# Patient Record
Sex: FEMALE | Race: WHITE | Employment: OTHER | URBAN - METROPOLITAN AREA
[De-identification: names, ages, dates, MRNs, and addresses within clinical notes are randomized per-mention and may not be internally consistent; named-entity substitution may affect disease eponyms.]

---

## 2017-01-11 PROBLEM — M85.80 OSTEOPENIA: Status: ACTIVE | Noted: 2017-01-11

## 2017-01-11 PROBLEM — E11.9 TYPE 2 DIABETES MELLITUS WITHOUT COMPLICATION, WITHOUT LONG-TERM CURRENT USE OF INSULIN (HCC): Status: ACTIVE | Noted: 2017-01-11

## 2017-03-29 ENCOUNTER — TELEPHONE (OUTPATIENT)
Dept: INTERNAL MEDICINE CLINIC | Facility: CLINIC | Age: 67
End: 2017-03-29

## 2017-04-06 ENCOUNTER — TELEPHONE (OUTPATIENT)
Dept: NEUROLOGY | Facility: CLINIC | Age: 67
End: 2017-04-06

## 2017-04-07 ENCOUNTER — TELEPHONE (OUTPATIENT)
Dept: ENDOCRINOLOGY CLINIC | Facility: CLINIC | Age: 67
End: 2017-04-07

## 2017-04-07 DIAGNOSIS — E11.9 TYPE 2 DIABETES MELLITUS WITHOUT COMPLICATION, WITHOUT LONG-TERM CURRENT USE OF INSULIN (HCC): Primary | ICD-10-CM

## 2017-04-07 NOTE — TELEPHONE ENCOUNTER
Pt stated she will call back to schedule AWV. She also stated Dr Meghan Wiseman ordered a lipid and A1C she will be completing that next week and will have results sent to us.

## 2017-04-07 NOTE — TELEPHONE ENCOUNTER
LOV w JL 12/16 and needs lipids done this year. Noted she is also eligible for welcome to medicare visit. Labs are ordered. Please call her, thank you.

## 2017-04-13 PROCEDURE — 82570 ASSAY OF URINE CREATININE: CPT | Performed by: INTERNAL MEDICINE

## 2017-04-13 PROCEDURE — 82043 UR ALBUMIN QUANTITATIVE: CPT | Performed by: INTERNAL MEDICINE

## 2017-04-14 NOTE — TELEPHONE ENCOUNTER
Future Appointments  Date Time Provider Dahlia Liset   9/11/2017 8:30 AM Joaquin Wu MD [facility]     Patient wants to know if JL would like labs done?

## 2017-04-17 PROBLEM — E04.1 NODULAR THYROID DISEASE: Status: ACTIVE | Noted: 2017-04-17

## 2017-04-17 PROBLEM — E78.2 MIXED HYPERLIPIDEMIA: Status: ACTIVE | Noted: 2017-04-17

## 2017-04-17 PROBLEM — M85.80 OSTEOPENIA, UNSPECIFIED LOCATION: Status: ACTIVE | Noted: 2017-04-17

## 2017-05-02 ENCOUNTER — TELEPHONE (OUTPATIENT)
Dept: NEUROLOGY | Facility: CLINIC | Age: 67
End: 2017-05-02

## 2017-05-02 ENCOUNTER — LAB ENCOUNTER (OUTPATIENT)
Dept: LAB | Age: 67
End: 2017-05-02
Attending: Other
Payer: MEDICARE

## 2017-05-02 DIAGNOSIS — R89.9 ABNORMAL LABORATORY TEST RESULT: Primary | ICD-10-CM

## 2017-05-02 DIAGNOSIS — R89.9 ABNORMAL LABORATORY TEST: Primary | ICD-10-CM

## 2017-05-02 PROCEDURE — 86225 DNA ANTIBODY NATIVE: CPT

## 2017-05-02 PROCEDURE — 86235 NUCLEAR ANTIGEN ANTIBODY: CPT

## 2017-05-02 PROCEDURE — 36415 COLL VENOUS BLD VENIPUNCTURE: CPT

## 2017-05-02 PROCEDURE — 86038 ANTINUCLEAR ANTIBODIES: CPT

## 2017-05-02 NOTE — TELEPHONE ENCOUNTER
ROSARIO order placed in October is set up to go through [facility]. Patient is requesting Edw. Re-ordered as patient per reason for call planning on going to facility today. Informed , Jose R Will (ok per HIPAA). Verbalized understanding.

## 2017-05-19 ENCOUNTER — MED REC SCAN ONLY (OUTPATIENT)
Dept: INTERNAL MEDICINE CLINIC | Facility: CLINIC | Age: 67
End: 2017-05-19

## 2017-05-26 ENCOUNTER — TELEPHONE (OUTPATIENT)
Dept: HEMATOLOGY/ONCOLOGY | Facility: HOSPITAL | Age: 67
End: 2017-05-26

## 2017-05-26 NOTE — TELEPHONE ENCOUNTER
Patient phoned requesting order for right breast Mammogram. Mammogram order for right breast with ultrasound if indicated.  Patient to schedule f/u with Dr. Tata Joya if indicated after mammogram.

## 2017-06-12 ENCOUNTER — HOSPITAL ENCOUNTER (OUTPATIENT)
Dept: MAMMOGRAPHY | Facility: HOSPITAL | Age: 67
Discharge: HOME | End: 2017-06-12
Attending: CLINICAL NURSE SPECIALIST
Payer: MEDICARE

## 2017-06-12 DIAGNOSIS — N60.91 ATYPICAL DUCTAL HYPERPLASIA OF RIGHT BREAST: ICD-10-CM

## 2017-06-12 PROCEDURE — 77065 DX MAMMO INCL CAD UNI: CPT | Performed by: CLINICAL NURSE SPECIALIST

## 2017-06-12 PROCEDURE — 77061 BREAST TOMOSYNTHESIS UNI: CPT | Performed by: CLINICAL NURSE SPECIALIST

## 2017-06-13 DIAGNOSIS — E11.9 TYPE 2 DIABETES MELLITUS WITHOUT COMPLICATION, WITHOUT LONG-TERM CURRENT USE OF INSULIN (HCC): Primary | ICD-10-CM

## 2017-06-13 NOTE — PROGRESS NOTES
Spoke w/pt and she will go to [facility] lab to get done-please enter labs to edward so she can go and get the labs done

## 2017-06-13 NOTE — PROGRESS NOTES
Patient is due for lipid. Last lipid 2014.   Please call her and let her know order placed to [facility]. ( it was not done with her labs in April)

## 2017-06-13 NOTE — PROGRESS NOTES
HPI:    Patient ID: Bartolo He is a 73year old female.     HPI    Review of Systems         Current Outpatient Prescriptions:  Glucose Blood (ONETOUCH ULTRA BLUE) In Vitro Strip Test 2 times daily Disp: 200 each Rfl: 3   simvastatin 20 MG Oral Tab Amanda Da Silva

## 2017-06-16 ENCOUNTER — OFFICE VISIT (OUTPATIENT)
Dept: SURGERY | Facility: CLINIC | Age: 67
End: 2017-06-16

## 2017-06-16 VITALS
OXYGEN SATURATION: 97 % | HEART RATE: 74 BPM | BODY MASS INDEX: 31 KG/M2 | SYSTOLIC BLOOD PRESSURE: 121 MMHG | WEIGHT: 193 LBS | TEMPERATURE: 97 F | RESPIRATION RATE: 18 BRPM | DIASTOLIC BLOOD PRESSURE: 75 MMHG

## 2017-06-16 DIAGNOSIS — N60.81 FLAT EPITHELIAL ATYPIA (FEA) OF RIGHT BREAST: ICD-10-CM

## 2017-06-16 DIAGNOSIS — Z91.89 AT HIGH RISK FOR BREAST CANCER: Primary | ICD-10-CM

## 2017-06-16 PROCEDURE — 99214 OFFICE O/P EST MOD 30 MIN: CPT | Performed by: SURGERY

## 2017-06-16 RX ORDER — DOXYCYCLINE HYCLATE 100 MG
TABLET ORAL
COMMUNITY
Start: 2017-06-05 | End: 2017-06-16

## 2017-06-16 NOTE — PROGRESS NOTES
Breast Surgery Follow-Up Visit    Diagnosis: ADH/FEA of the Right breast status post Right breast excisional biopsy with preoperative wire localization on November 10, 2015. Stage: N/A    Date of Surgery: November 2015    History:    This 73year old wo UNLISTED  1/1/1968    Comment Biopsy breast open, LEFT NEGATIVE  MARKER PLACED IN BREAST    CATARACT  1/3/2011    Comment Cataract surgery, LEFT    COLONOSCOPY  1/1/2005    Comment negative per patient    HYSTERECTOMY  1/1/1988    Comment WITH CAROL Meneses tablet Rfl: 1   Glucose Blood (ONETOUCH VERIO) In Vitro Strip 1 each by Other route daily. Disp: 100 strip Rfl: 2   B Complex Vitamins (VITAMIN B COMPLEX OR) Inject  as directed daily. Disp:  Rfl:    Ascorbic Acid (VITAMIN C OR) Take  by mouth daily.  Disp: history of chest pain, chest pressure/discomfort, palpitations, irregular heartbeat, fainting or near-fainting, difficulty breathing when lying flat, SOB/Coughing at night, swelling of the legs or chest pain while walking.     Breasts:  See history of prese well-nourished, alert and oriented woman. There is not palpable cervical, supraclavicular or axillary adenopathy. The neck is supple with a midline trachea and no thyromegaly. Range of motion is good at both shoulders.  Breasts are symmetrical. The tumorec cancer. We then turned our discussion towards genetic counseling and testing as her likelihood for carrying a mutation is Low.   Based on the findings, I would not recommend Fidela Friday for counseling and testing unless an additional family membe total of 25 minutes, more than 50% of which was dedicated to the discussion of management options.

## 2017-06-21 RX ORDER — TOLTERODINE 2 MG/1
2 CAPSULE, EXTENDED RELEASE ORAL DAILY
Qty: 90 CAPSULE | Refills: 1 | Status: SHIPPED | OUTPATIENT
Start: 2017-06-21 | End: 2018-03-26

## 2017-06-21 NOTE — TELEPHONE ENCOUNTER
LOV: 12/7/16 w/ JL for f/u  FOV: 9/11/17 MWV  Last labs: 4/13/17 CMP,A1C,Microalbumin  Last Refill: 12/7/16 qt:90 1 refill  Per protocol sent to provider

## 2017-07-14 NOTE — ADDENDUM NOTE
Encounter addended by:  Tierra Cortes MA on: 7/14/2017 10:31 AM<BR>    Actions taken: Letter status changed

## 2017-08-29 RX ORDER — TOLTERODINE 2 MG/1
CAPSULE, EXTENDED RELEASE ORAL
Qty: 90 CAPSULE | Refills: 0 | Status: SHIPPED | OUTPATIENT
Start: 2017-08-29 | End: 2017-09-11

## 2017-08-29 NOTE — TELEPHONE ENCOUNTER
LOV: 12/07/2016   Upcoming visit: 9/11/2017   Last labs: 4/13/2017  Hgb A1c, , CMP,   Last refill: 6/21/2017 , 90 tabs, 1 refill   Per protocol  Route to provider

## 2017-09-11 ENCOUNTER — OFFICE VISIT (OUTPATIENT)
Dept: INTERNAL MEDICINE CLINIC | Facility: CLINIC | Age: 67
End: 2017-09-11

## 2017-09-11 VITALS
HEART RATE: 66 BPM | WEIGHT: 195 LBS | HEIGHT: 66 IN | BODY MASS INDEX: 31.34 KG/M2 | DIASTOLIC BLOOD PRESSURE: 68 MMHG | SYSTOLIC BLOOD PRESSURE: 114 MMHG | TEMPERATURE: 99 F | OXYGEN SATURATION: 99 % | RESPIRATION RATE: 17 BRPM

## 2017-09-11 DIAGNOSIS — E78.00 PURE HYPERCHOLESTEROLEMIA: ICD-10-CM

## 2017-09-11 DIAGNOSIS — Z78.0 POST-MENOPAUSAL: ICD-10-CM

## 2017-09-11 DIAGNOSIS — N32.81 OAB (OVERACTIVE BLADDER): ICD-10-CM

## 2017-09-11 DIAGNOSIS — E11.9 TYPE 2 DIABETES MELLITUS WITHOUT COMPLICATION, WITHOUT LONG-TERM CURRENT USE OF INSULIN (HCC): ICD-10-CM

## 2017-09-11 DIAGNOSIS — Z00.00 ROUTINE GENERAL MEDICAL EXAMINATION AT A HEALTH CARE FACILITY: Primary | ICD-10-CM

## 2017-09-11 LAB
CARTRIDGE LOT#: 741 NUMERIC
HEMOGLOBIN A1C: 6.9 % (ref 4.3–5.6)

## 2017-09-11 PROCEDURE — G0008 ADMIN INFLUENZA VIRUS VAC: HCPCS | Performed by: INTERNAL MEDICINE

## 2017-09-11 PROCEDURE — 90653 IIV ADJUVANT VACCINE IM: CPT | Performed by: INTERNAL MEDICINE

## 2017-09-11 PROCEDURE — 83036 HEMOGLOBIN GLYCOSYLATED A1C: CPT | Performed by: INTERNAL MEDICINE

## 2017-09-11 PROCEDURE — 99213 OFFICE O/P EST LOW 20 MIN: CPT | Performed by: INTERNAL MEDICINE

## 2017-09-11 PROCEDURE — G0402 INITIAL PREVENTIVE EXAM: HCPCS | Performed by: INTERNAL MEDICINE

## 2017-09-12 NOTE — PROGRESS NOTES
HPI:    Patient ID: Danney Ganser is a 73year old female.     HPI  Here for initial awv, feels well, oab controlled with current therapy, hypercholesterolemia, sugars ok but up a bit, htn, taking and tolerating meds  /68   Pulse 66   Temp 98.6 °F ( Allergies   PHYSICAL EXAM:   Physical Exam   Constitutional: She is oriented to person, place, and time. She appears well-developed and well-nourished. HENT:   Head: Normocephalic and atraumatic.    Right Ear: External ear normal.   Left Ear: External ear

## 2017-09-15 ENCOUNTER — HOSPITAL ENCOUNTER (OUTPATIENT)
Dept: BONE DENSITY | Age: 67
Discharge: HOME | End: 2017-09-15
Attending: INTERNAL MEDICINE
Payer: MEDICARE

## 2017-09-15 ENCOUNTER — APPOINTMENT (OUTPATIENT)
Dept: LAB | Age: 67
End: 2017-09-15
Attending: INTERNAL MEDICINE
Payer: MEDICARE

## 2017-09-15 DIAGNOSIS — E11.9 TYPE 2 DIABETES MELLITUS WITHOUT COMPLICATION, WITHOUT LONG-TERM CURRENT USE OF INSULIN (HCC): ICD-10-CM

## 2017-09-15 DIAGNOSIS — E78.2 MIXED HYPERLIPIDEMIA: ICD-10-CM

## 2017-09-15 DIAGNOSIS — M85.80 OSTEOPENIA, UNSPECIFIED LOCATION: ICD-10-CM

## 2017-09-15 DIAGNOSIS — E04.1 NODULAR THYROID DISEASE: ICD-10-CM

## 2017-09-15 DIAGNOSIS — Z78.0 POST-MENOPAUSAL: ICD-10-CM

## 2017-09-15 LAB
ALBUMIN SERPL-MCNC: 3.7 G/DL (ref 3.5–4.8)
ALP LIVER SERPL-CCNC: 70 U/L (ref 50–130)
ALT SERPL-CCNC: 35 U/L (ref 14–54)
AST SERPL-CCNC: 24 U/L (ref 15–41)
BILIRUB SERPL-MCNC: 0.3 MG/DL (ref 0.1–2)
BUN BLD-MCNC: 20 MG/DL (ref 8–20)
CALCIUM BLD-MCNC: 9.6 MG/DL (ref 8.3–10.3)
CHLORIDE: 106 MMOL/L (ref 101–111)
CHOLEST SMN-MCNC: 185 MG/DL (ref ?–200)
CO2: 26 MMOL/L (ref 22–32)
CREAT BLD-MCNC: 0.72 MG/DL (ref 0.55–1.02)
FREE T4: 1 NG/DL (ref 0.9–1.8)
GLUCOSE BLD-MCNC: 123 MG/DL (ref 70–99)
HDLC SERPL-MCNC: 84 MG/DL (ref 45–?)
HDLC SERPL: 2.2 {RATIO} (ref ?–4.44)
LDLC SERPL CALC-MCNC: 85 MG/DL (ref ?–130)
LDLC SERPL-MCNC: 16 MG/DL (ref 5–40)
M PROTEIN MFR SERPL ELPH: 7.6 G/DL (ref 6.1–8.3)
NONHDLC SERPL-MCNC: 101 MG/DL (ref ?–130)
POTASSIUM SERPL-SCNC: 4.3 MMOL/L (ref 3.6–5.1)
SODIUM SERPL-SCNC: 138 MMOL/L (ref 136–144)
TRIGLYCERIDES: 82 MG/DL (ref ?–150)
TSI SER-ACNC: 2.47 MIU/ML (ref 0.35–5.5)

## 2017-09-15 PROCEDURE — 84443 ASSAY THYROID STIM HORMONE: CPT

## 2017-09-15 PROCEDURE — 77080 DXA BONE DENSITY AXIAL: CPT | Performed by: INTERNAL MEDICINE

## 2017-09-15 PROCEDURE — 80053 COMPREHEN METABOLIC PANEL: CPT

## 2017-09-15 PROCEDURE — 84439 ASSAY OF FREE THYROXINE: CPT

## 2018-01-08 ENCOUNTER — HOSPITAL ENCOUNTER (OUTPATIENT)
Dept: MAMMOGRAPHY | Facility: HOSPITAL | Age: 68
Discharge: HOME | End: 2018-01-08
Attending: SURGERY
Payer: MEDICARE

## 2018-01-08 ENCOUNTER — TELEPHONE (OUTPATIENT)
Dept: SURGERY | Facility: CLINIC | Age: 68
End: 2018-01-08

## 2018-01-08 DIAGNOSIS — Z91.89 AT HIGH RISK FOR BREAST CANCER: ICD-10-CM

## 2018-01-08 DIAGNOSIS — N60.81 FLAT EPITHELIAL ATYPIA (FEA) OF RIGHT BREAST: ICD-10-CM

## 2018-01-08 DIAGNOSIS — R92.1 BREAST CALCIFICATION, RIGHT: Primary | ICD-10-CM

## 2018-01-08 PROCEDURE — 77066 DX MAMMO INCL CAD BI: CPT | Performed by: SURGERY

## 2018-01-08 PROCEDURE — 76642 ULTRASOUND BREAST LIMITED: CPT | Performed by: SURGERY

## 2018-01-08 PROCEDURE — 77062 BREAST TOMOSYNTHESIS BI: CPT | Performed by: SURGERY

## 2018-01-08 NOTE — IMAGING NOTE
Asssisted Dr. Ashly Gonzalez with recommendation for a right stereotactic breast biopsy for calcifications. Emotional and educational support provided. Written information provided to Ervin Harris.  Our breast center schedulers will call pt within 72 hours to

## 2018-01-08 NOTE — TELEPHONE ENCOUNTER
I contacted the patient to let her know Dr Katie Ferrera reviewed recent imaging with the radiologist, and agrees that she needs a biopsy of the area. Reviewed that the order is placed, and I will have someone call her to schedule.   Pt has already been contacte

## 2018-01-12 ENCOUNTER — HOSPITAL ENCOUNTER (OUTPATIENT)
Dept: MAMMOGRAPHY | Facility: HOSPITAL | Age: 68
Discharge: HOME | End: 2018-01-12
Attending: SURGERY
Payer: MEDICARE

## 2018-01-12 DIAGNOSIS — R92.1 BREAST CALCIFICATION, RIGHT: ICD-10-CM

## 2018-01-12 PROCEDURE — 88305 TISSUE EXAM BY PATHOLOGIST: CPT | Performed by: SURGERY

## 2018-01-12 PROCEDURE — 19081 BX BREAST 1ST LESION STRTCTC: CPT | Performed by: SURGERY

## 2018-02-15 PROCEDURE — 82570 ASSAY OF URINE CREATININE: CPT | Performed by: INTERNAL MEDICINE

## 2018-02-15 PROCEDURE — 82043 UR ALBUMIN QUANTITATIVE: CPT | Performed by: INTERNAL MEDICINE

## 2018-03-26 ENCOUNTER — OFFICE VISIT (OUTPATIENT)
Dept: INTERNAL MEDICINE CLINIC | Facility: CLINIC | Age: 68
End: 2018-03-26

## 2018-03-26 VITALS
BODY MASS INDEX: 30.69 KG/M2 | WEIGHT: 184.19 LBS | TEMPERATURE: 98 F | HEIGHT: 65 IN | DIASTOLIC BLOOD PRESSURE: 70 MMHG | HEART RATE: 68 BPM | SYSTOLIC BLOOD PRESSURE: 120 MMHG | RESPIRATION RATE: 12 BRPM

## 2018-03-26 DIAGNOSIS — E11.9 TYPE 2 DIABETES MELLITUS WITHOUT COMPLICATION, WITHOUT LONG-TERM CURRENT USE OF INSULIN (HCC): ICD-10-CM

## 2018-03-26 DIAGNOSIS — M25.511 CHRONIC RIGHT SHOULDER PAIN: Primary | ICD-10-CM

## 2018-03-26 DIAGNOSIS — G89.29 CHRONIC RIGHT SHOULDER PAIN: Primary | ICD-10-CM

## 2018-03-26 PROCEDURE — 90732 PPSV23 VACC 2 YRS+ SUBQ/IM: CPT | Performed by: INTERNAL MEDICINE

## 2018-03-26 PROCEDURE — G0009 ADMIN PNEUMOCOCCAL VACCINE: HCPCS | Performed by: INTERNAL MEDICINE

## 2018-03-26 PROCEDURE — 99213 OFFICE O/P EST LOW 20 MIN: CPT | Performed by: INTERNAL MEDICINE

## 2018-03-26 RX ORDER — TOLTERODINE 2 MG/1
2 CAPSULE, EXTENDED RELEASE ORAL DAILY
Qty: 90 CAPSULE | Refills: 1 | Status: SHIPPED | OUTPATIENT
Start: 2018-03-26 | End: 2018-07-29

## 2018-03-26 NOTE — PROGRESS NOTES
HPI:    Patient ID: Petey Bartholomew is a 78year old female.     HPI  Here for oab, dm, no lows, hasn't check post pran in a while, co right shoulder discomfort and decreased rom  /70 (BP Location: Right arm, Patient Position: Sitting, Cuff Size: adul sounds normal. She has no wheezes. Musculoskeletal: She exhibits no edema. Right sholder with decreased rom   Neurological: She is oriented to person, place, and time. Bilateral lower extremities visually inspected.  Bilateral foot monofilament skin e

## 2018-04-23 ENCOUNTER — HOSPITAL ENCOUNTER (OUTPATIENT)
Dept: GENERAL RADIOLOGY | Facility: HOSPITAL | Age: 68
Discharge: HOME | End: 2018-04-23
Attending: INTERNAL MEDICINE
Payer: MEDICARE

## 2018-04-23 DIAGNOSIS — G89.29 CHRONIC RIGHT SHOULDER PAIN: ICD-10-CM

## 2018-04-23 DIAGNOSIS — M25.511 CHRONIC RIGHT SHOULDER PAIN: ICD-10-CM

## 2018-04-23 PROCEDURE — 73030 X-RAY EXAM OF SHOULDER: CPT | Performed by: INTERNAL MEDICINE

## 2018-04-25 ENCOUNTER — TELEPHONE (OUTPATIENT)
Dept: INTERNAL MEDICINE CLINIC | Facility: CLINIC | Age: 68
End: 2018-04-25

## 2018-04-26 ENCOUNTER — MED REC SCAN ONLY (OUTPATIENT)
Dept: INTERNAL MEDICINE CLINIC | Facility: CLINIC | Age: 68
End: 2018-04-26

## 2018-05-04 ENCOUNTER — HOSPITAL ENCOUNTER (OUTPATIENT)
Dept: PHYSICAL THERAPY | Facility: HOSPITAL | Age: 68
Setting detail: THERAPIES SERIES
Discharge: HOME | End: 2018-05-04
Attending: INTERNAL MEDICINE
Payer: MEDICARE

## 2018-05-04 DIAGNOSIS — G89.29 CHRONIC RIGHT SHOULDER PAIN: ICD-10-CM

## 2018-05-04 DIAGNOSIS — M25.511 CHRONIC RIGHT SHOULDER PAIN: ICD-10-CM

## 2018-05-04 NOTE — PROGRESS NOTES
UPPER EXTREMITY EVALUATION:   Referring Physician: Dr. Yary Luevano  Diagnosis: right shoulder pain    Date of Service: 5/4/2018     PATIENT SUMMARY   Aydin Austin is a 78year old y/o female who presents to therapy today with complaints o right shoulder findings that reproduce the patient's chief complaint include +impingement sign, + inferior glide discomfort. Physical Exam findings that improve the patient's chief complaint including stretch ROM  .  Functional limitations include lifting, use of right U improve shoulder flexion AROM to >150 degrees to be able to reach into overhead cabinets without pain or restriction (8 visits)  · Pt will improve shoulder abduction AROM to >150 degrees to improve ability to don deodorant, don/doff shirts, and wash hair ( PT    [de-identified] certification required: Yes  I certify the need for these services furnished under this plan of treatment and while under my care.     X___________________________________________________ Date____________________    Certification From: 5/4

## 2018-05-08 ENCOUNTER — HOSPITAL ENCOUNTER (OUTPATIENT)
Dept: PHYSICAL THERAPY | Facility: HOSPITAL | Age: 68
Setting detail: THERAPIES SERIES
Discharge: HOME | End: 2018-05-08
Attending: INTERNAL MEDICINE
Payer: MEDICARE

## 2018-05-08 PROCEDURE — 97140 MANUAL THERAPY 1/> REGIONS: CPT | Performed by: PHYSICAL THERAPIST

## 2018-05-08 PROCEDURE — 97110 THERAPEUTIC EXERCISES: CPT | Performed by: PHYSICAL THERAPIST

## 2018-05-08 PROCEDURE — 97112 NEUROMUSCULAR REEDUCATION: CPT | Performed by: PHYSICAL THERAPIST

## 2018-05-08 NOTE — PROGRESS NOTES
Dx: right shoulder pain         Authorized # of Visits:  8         Next MD visit: none scheduled  Fall Risk: standard         Precautions: n/a             Subjective: My shoulder was sore after the  First visit. Not sure if the tape helped or not.      Obj

## 2018-05-11 ENCOUNTER — HOSPITAL ENCOUNTER (OUTPATIENT)
Dept: PHYSICAL THERAPY | Facility: HOSPITAL | Age: 68
Setting detail: THERAPIES SERIES
Discharge: HOME | End: 2018-05-11
Attending: INTERNAL MEDICINE
Payer: MEDICARE

## 2018-05-11 PROCEDURE — 97140 MANUAL THERAPY 1/> REGIONS: CPT | Performed by: PHYSICAL THERAPIST

## 2018-05-11 PROCEDURE — 97110 THERAPEUTIC EXERCISES: CPT | Performed by: PHYSICAL THERAPIST

## 2018-05-11 PROCEDURE — 97112 NEUROMUSCULAR REEDUCATION: CPT | Performed by: PHYSICAL THERAPIST

## 2018-05-11 NOTE — PROGRESS NOTES
Dx: right shoulder pain         Authorized # of Visits:  8         Next MD visit: none scheduled  Fall Risk: standard         Precautions: n/a             Subjective: Sleeping a lot better through the night. Shoulder is getting stronger.        Objective: S

## 2018-05-14 ENCOUNTER — HOSPITAL ENCOUNTER (OUTPATIENT)
Dept: PHYSICAL THERAPY | Facility: HOSPITAL | Age: 68
Setting detail: THERAPIES SERIES
Discharge: HOME | End: 2018-05-14
Attending: INTERNAL MEDICINE
Payer: MEDICARE

## 2018-05-14 PROCEDURE — 97110 THERAPEUTIC EXERCISES: CPT | Performed by: PHYSICAL THERAPIST

## 2018-05-14 PROCEDURE — 97140 MANUAL THERAPY 1/> REGIONS: CPT | Performed by: PHYSICAL THERAPIST

## 2018-05-14 PROCEDURE — 97112 NEUROMUSCULAR REEDUCATION: CPT | Performed by: PHYSICAL THERAPIST

## 2018-05-14 NOTE — PROGRESS NOTES
Dx: right shoulder pain         Authorized # of Visits:  8         Next MD visit: none scheduled  Fall Risk: standard         Precautions: n/a             Subjective: Have been trying to garden a little and my shoulder hurts a little but not too bad.      O

## 2018-05-17 ENCOUNTER — HOSPITAL ENCOUNTER (OUTPATIENT)
Dept: PHYSICAL THERAPY | Facility: HOSPITAL | Age: 68
Setting detail: THERAPIES SERIES
Discharge: HOME | End: 2018-05-17
Attending: INTERNAL MEDICINE
Payer: MEDICARE

## 2018-05-17 PROCEDURE — 97140 MANUAL THERAPY 1/> REGIONS: CPT | Performed by: PHYSICAL THERAPIST

## 2018-05-17 PROCEDURE — 97110 THERAPEUTIC EXERCISES: CPT | Performed by: PHYSICAL THERAPIST

## 2018-05-17 PROCEDURE — 97112 NEUROMUSCULAR REEDUCATION: CPT | Performed by: PHYSICAL THERAPIST

## 2018-05-17 NOTE — PROGRESS NOTES
Dx: right shoulder pain         Authorized # of Visits:  8         Next MD visit: none scheduled  Fall Risk: standard         Precautions: n/a             Subjective: Shoulder is feeling a little looser, not as tight.   Sore thought today, maybe because I w

## 2018-05-21 ENCOUNTER — HOSPITAL ENCOUNTER (OUTPATIENT)
Dept: PHYSICAL THERAPY | Facility: HOSPITAL | Age: 68
Setting detail: THERAPIES SERIES
Discharge: HOME | End: 2018-05-21
Attending: INTERNAL MEDICINE
Payer: MEDICARE

## 2018-05-21 PROCEDURE — 97110 THERAPEUTIC EXERCISES: CPT | Performed by: PHYSICAL THERAPIST

## 2018-05-21 PROCEDURE — 97140 MANUAL THERAPY 1/> REGIONS: CPT | Performed by: PHYSICAL THERAPIST

## 2018-05-21 PROCEDURE — 97112 NEUROMUSCULAR REEDUCATION: CPT | Performed by: PHYSICAL THERAPIST

## 2018-05-22 ENCOUNTER — APPOINTMENT (OUTPATIENT)
Dept: PHYSICAL THERAPY | Facility: HOSPITAL | Age: 68
End: 2018-05-22
Attending: INTERNAL MEDICINE
Payer: MEDICARE

## 2018-05-24 ENCOUNTER — APPOINTMENT (OUTPATIENT)
Dept: PHYSICAL THERAPY | Facility: HOSPITAL | Age: 68
End: 2018-05-24
Attending: INTERNAL MEDICINE
Payer: MEDICARE

## 2018-05-24 ENCOUNTER — HOSPITAL ENCOUNTER (OUTPATIENT)
Dept: PHYSICAL THERAPY | Facility: HOSPITAL | Age: 68
Setting detail: THERAPIES SERIES
Discharge: HOME | End: 2018-05-24
Attending: INTERNAL MEDICINE
Payer: MEDICARE

## 2018-05-24 PROCEDURE — 97140 MANUAL THERAPY 1/> REGIONS: CPT | Performed by: PHYSICAL THERAPIST

## 2018-05-24 PROCEDURE — 97110 THERAPEUTIC EXERCISES: CPT | Performed by: PHYSICAL THERAPIST

## 2018-05-24 PROCEDURE — 97112 NEUROMUSCULAR REEDUCATION: CPT | Performed by: PHYSICAL THERAPIST

## 2018-05-25 NOTE — PROGRESS NOTES
Dx: right shoulder pain         Authorized # of Visits:  8         Next MD visit: none scheduled  Fall Risk: standard         Precautions: n/a             Subjective: Have been working a lot on trying to clean my house as I have guests coming ,and my shoul scap bore with  green knobby roll.  STM to UT levator, lateral scap border Red TB x20   ER/IR   Rows  Extension  Lat pull downs      Red TB row  ER/IR   Extension x20 Red TB x20   ER/IR   Rows  Extension  Red TB x20   ER/IR   Rows  Extension Red TB x20   ER

## 2018-05-31 ENCOUNTER — HOSPITAL ENCOUNTER (OUTPATIENT)
Dept: PHYSICAL THERAPY | Facility: HOSPITAL | Age: 68
Setting detail: THERAPIES SERIES
Discharge: HOME | End: 2018-05-31
Attending: INTERNAL MEDICINE
Payer: MEDICARE

## 2018-05-31 PROCEDURE — 97110 THERAPEUTIC EXERCISES: CPT | Performed by: PHYSICAL THERAPIST

## 2018-05-31 PROCEDURE — 97140 MANUAL THERAPY 1/> REGIONS: CPT | Performed by: PHYSICAL THERAPIST

## 2018-05-31 PROCEDURE — 97112 NEUROMUSCULAR REEDUCATION: CPT | Performed by: PHYSICAL THERAPIST

## 2018-05-31 NOTE — PROGRESS NOTES
Dx: right shoulder pain         Authorized # of Visits:  8         Next MD visit: none scheduled  Fall Risk: standard         Precautions: n/a             Subjective: tape  seems like it really helped with the mm tightness, discomfort.     Objective: Seen f inferior lateral scap bore with  green knobby roll STM to right rotator cuff, inferior lateral scap bore with  green knobby roll.  STM to UT levator, lateral scap border Red TB x20   ER/IR   Rows  Extension  Lat pull downs  Red TB x20   ER/IR   Rows  Extens

## 2018-06-04 ENCOUNTER — APPOINTMENT (OUTPATIENT)
Dept: PHYSICAL THERAPY | Facility: HOSPITAL | Age: 68
End: 2018-06-04
Attending: INTERNAL MEDICINE
Payer: MEDICARE

## 2018-06-04 ENCOUNTER — HOSPITAL ENCOUNTER (OUTPATIENT)
Dept: PHYSICAL THERAPY | Facility: HOSPITAL | Age: 68
Setting detail: THERAPIES SERIES
Discharge: HOME | End: 2018-06-04
Attending: INTERNAL MEDICINE
Payer: MEDICARE

## 2018-06-04 PROCEDURE — 97112 NEUROMUSCULAR REEDUCATION: CPT | Performed by: PHYSICAL THERAPIST

## 2018-06-04 PROCEDURE — 97140 MANUAL THERAPY 1/> REGIONS: CPT | Performed by: PHYSICAL THERAPIST

## 2018-06-04 PROCEDURE — 97110 THERAPEUTIC EXERCISES: CPT | Performed by: PHYSICAL THERAPIST

## 2018-06-04 NOTE — PROGRESS NOTES
Dx: right shoulder pain         Authorized # of Visits:  8         Next MD visit: none scheduled  Fall Risk: standard         Precautions: n/a             Subjective: Overall I can tell that my shoulder is stronger, and less tight.   Still have some difficu MET      Plan:  DC PT at this time, all goals achieved. Will call with any questions, concerns.     Date: 5/8/2018 TX#: 2/8 Date:        5/11/18         TX#: 3/   Date:       5/14/18        TX#: 4/8 Date:      5/17/18         TX#: 5/ Date:  5/21/18 Rows  Extension  Red TB x20   ER/IR   Rows  Extension Red TB x20   ER/IR   Rows  Extension ER/IR sidelying 2#x20 fatigue ER/IR sidelying 2#x20 fatigue ER/IR sidelying 2#x20 fatigue      ER/IR sidelying 2#x20 fatigue    REVIEW HEP                       Sk

## 2018-06-07 ENCOUNTER — APPOINTMENT (OUTPATIENT)
Dept: PHYSICAL THERAPY | Facility: HOSPITAL | Age: 68
End: 2018-06-07
Attending: INTERNAL MEDICINE
Payer: MEDICARE

## 2018-06-12 ENCOUNTER — APPOINTMENT (OUTPATIENT)
Dept: PHYSICAL THERAPY | Facility: HOSPITAL | Age: 68
End: 2018-06-12
Attending: INTERNAL MEDICINE
Payer: MEDICARE

## 2018-06-14 ENCOUNTER — APPOINTMENT (OUTPATIENT)
Dept: PHYSICAL THERAPY | Facility: HOSPITAL | Age: 68
End: 2018-06-14
Attending: INTERNAL MEDICINE
Payer: MEDICARE

## 2018-06-19 ENCOUNTER — APPOINTMENT (OUTPATIENT)
Dept: PHYSICAL THERAPY | Facility: HOSPITAL | Age: 68
End: 2018-06-19
Attending: INTERNAL MEDICINE
Payer: MEDICARE

## 2018-06-21 ENCOUNTER — APPOINTMENT (OUTPATIENT)
Dept: PHYSICAL THERAPY | Facility: HOSPITAL | Age: 68
End: 2018-06-21
Attending: INTERNAL MEDICINE
Payer: MEDICARE

## 2018-06-26 ENCOUNTER — OFFICE VISIT (OUTPATIENT)
Dept: SURGERY | Facility: CLINIC | Age: 68
End: 2018-06-26

## 2018-06-26 VITALS
BODY MASS INDEX: 29.7 KG/M2 | HEIGHT: 66 IN | OXYGEN SATURATION: 98 % | SYSTOLIC BLOOD PRESSURE: 104 MMHG | DIASTOLIC BLOOD PRESSURE: 68 MMHG | HEART RATE: 75 BPM | RESPIRATION RATE: 20 BRPM | WEIGHT: 184.81 LBS

## 2018-06-26 DIAGNOSIS — N60.81 FLAT EPITHELIAL ATYPIA (FEA) OF RIGHT BREAST: ICD-10-CM

## 2018-06-26 DIAGNOSIS — R92.1 BREAST CALCIFICATION, RIGHT: Primary | ICD-10-CM

## 2018-06-26 PROCEDURE — 99214 OFFICE O/P EST MOD 30 MIN: CPT | Performed by: SURGERY

## 2018-06-27 NOTE — PROGRESS NOTES
Breast Surgery Follow-Up Visit    Diagnosis: ADH/FEA of the Right breast status post Right breast excisional biopsy with preoperative wire localization on November 10, 2015. Stage: N/A    Date of Surgery: November 2015    History:    This 78year old wo Comment: excisional bx of right breast  1/1/1969: BREAST SURGERY PROCEDURE UNLISTED      Comment: Biopsy breast open, LEFT NEGATIVE  MARKER                PLACED IN BREAST  1/3/2011: CATARACT      Comment: Cataract surgery, LEFT  1/1/2005: COLONOSCOPY aspirin (ECOTRIN LOW STRENGTH) 81 MG Oral Tab EC Take 81 mg by mouth daily. Disp:  Rfl:    Multiple Vitamins-Minerals (CENTRUM) Oral Tab Take 1 tablet by mouth daily.  Disp:  Rfl:        Allergies:    No Known Allergies     Family History   Problem Relati illness    Gastrointestinal:     There is no history of difficulty or pain with swallowing, reflux symptoms, vomiting, dark or bloody stools, constipation, yellowing of the skin, indigestion, nausea, change in bowel habits, diarrhea, abdominal pain or vomi is good at both shoulders. Breasts are symmetrical. The tumorectomy site is in the Right breast and shows no evidence of local recurrence. Both nipples are everted with no discharge.  There is not dominant masses, focal nodularity or skin changes on either diagnosed with breast cancer. We then turned our discussion towards genetic counseling and testing as her likelihood for carrying a mutation is Low.   Based on the findings, I would not recommend Deon Harmon for counseling and testing unless an a

## 2018-07-25 ENCOUNTER — HOSPITAL ENCOUNTER (OUTPATIENT)
Dept: MAMMOGRAPHY | Facility: HOSPITAL | Age: 68
Discharge: HOME | End: 2018-07-25
Attending: SURGERY
Payer: MEDICARE

## 2018-07-25 DIAGNOSIS — R92.1 BREAST CALCIFICATION, RIGHT: Primary | ICD-10-CM

## 2018-07-25 DIAGNOSIS — R92.1 BREAST CALCIFICATION, RIGHT: ICD-10-CM

## 2018-07-25 PROCEDURE — 77065 DX MAMMO INCL CAD UNI: CPT | Performed by: SURGERY

## 2018-07-25 PROCEDURE — 77061 BREAST TOMOSYNTHESIS UNI: CPT | Performed by: SURGERY

## 2018-07-30 ENCOUNTER — MED REC SCAN ONLY (OUTPATIENT)
Dept: INTERNAL MEDICINE CLINIC | Facility: CLINIC | Age: 68
End: 2018-07-30

## 2018-07-30 RX ORDER — TOLTERODINE 2 MG/1
CAPSULE, EXTENDED RELEASE ORAL
Qty: 90 CAPSULE | Refills: 1 | Status: SHIPPED | OUTPATIENT
Start: 2018-07-30 | End: 2019-01-22

## 2018-07-30 NOTE — TELEPHONE ENCOUNTER
LOV: 3/26/2018   LAST LABS: 6/15/2018 HGB A1C, CMP, TSH   LAST REFILL: 3/26/2018 90 CAPS 1 REFILL     PER PROTOCOL ROUTE TO PROVIDER

## 2018-09-21 ENCOUNTER — OFFICE VISIT (OUTPATIENT)
Dept: INTERNAL MEDICINE CLINIC | Facility: CLINIC | Age: 68
End: 2018-09-21
Payer: COMMERCIAL

## 2018-09-21 VITALS
TEMPERATURE: 98 F | HEIGHT: 65 IN | WEIGHT: 188 LBS | DIASTOLIC BLOOD PRESSURE: 74 MMHG | SYSTOLIC BLOOD PRESSURE: 126 MMHG | BODY MASS INDEX: 31.32 KG/M2 | RESPIRATION RATE: 20 BRPM | HEART RATE: 64 BPM

## 2018-09-21 DIAGNOSIS — F41.9 ANXIETY: ICD-10-CM

## 2018-09-21 DIAGNOSIS — E78.00 PURE HYPERCHOLESTEROLEMIA: ICD-10-CM

## 2018-09-21 DIAGNOSIS — N60.91 ATYPICAL DUCTAL HYPERPLASIA OF RIGHT BREAST: ICD-10-CM

## 2018-09-21 DIAGNOSIS — E04.1 NODULAR THYROID DISEASE: ICD-10-CM

## 2018-09-21 DIAGNOSIS — E66.09 CLASS 1 OBESITY DUE TO EXCESS CALORIES WITH SERIOUS COMORBIDITY AND BODY MASS INDEX (BMI) OF 31.0 TO 31.9 IN ADULT: ICD-10-CM

## 2018-09-21 DIAGNOSIS — R92.0 MICROCALCIFICATION OF RIGHT BREAST ON MAMMOGRAM: ICD-10-CM

## 2018-09-21 DIAGNOSIS — Z80.41 FAMILY HISTORY OF MALIGNANT NEOPLASM OF OVARY: ICD-10-CM

## 2018-09-21 DIAGNOSIS — E11.9 TYPE 2 DIABETES MELLITUS WITHOUT COMPLICATION, WITHOUT LONG-TERM CURRENT USE OF INSULIN (HCC): ICD-10-CM

## 2018-09-21 DIAGNOSIS — Z00.00 ROUTINE GENERAL MEDICAL EXAMINATION AT A HEALTH CARE FACILITY: Primary | ICD-10-CM

## 2018-09-21 DIAGNOSIS — E78.2 MIXED HYPERLIPIDEMIA: ICD-10-CM

## 2018-09-21 DIAGNOSIS — M85.80 OSTEOPENIA, UNSPECIFIED LOCATION: ICD-10-CM

## 2018-09-21 DIAGNOSIS — R47.89 WORD FINDING PROBLEM: ICD-10-CM

## 2018-09-21 PROCEDURE — G0008 ADMIN INFLUENZA VIRUS VAC: HCPCS | Performed by: INTERNAL MEDICINE

## 2018-09-21 PROCEDURE — 96160 PT-FOCUSED HLTH RISK ASSMT: CPT | Performed by: INTERNAL MEDICINE

## 2018-09-21 PROCEDURE — G0439 PPPS, SUBSEQ VISIT: HCPCS | Performed by: INTERNAL MEDICINE

## 2018-09-21 PROCEDURE — 99397 PER PM REEVAL EST PAT 65+ YR: CPT | Performed by: INTERNAL MEDICINE

## 2018-09-21 PROCEDURE — 90653 IIV ADJUVANT VACCINE IM: CPT | Performed by: INTERNAL MEDICINE

## 2018-09-21 NOTE — PROGRESS NOTES
HPI:    Patient ID: Obi Cortez is a 78year old female.     HPI  Here for for AHA, no acute complaints, DM, OAB controlled on meds, overweight, anxiety controlled with lifestyle, feels ok, recent edgardo results reviewed  /74   Pulse 64   Temp 98.2 ° well-developed and well-nourished. HENT:   Head: Normocephalic and atraumatic. Right Ear: External ear normal.   Left Ear: External ear normal.   Mouth/Throat: No oropharyngeal exudate. Eyes: Pupils are equal, round, and reactive to light.  Right eye

## 2018-09-24 PROCEDURE — 80061 LIPID PANEL: CPT | Performed by: INTERNAL MEDICINE

## 2018-12-19 ENCOUNTER — TELEPHONE (OUTPATIENT)
Dept: INTERNAL MEDICINE CLINIC | Facility: CLINIC | Age: 68
End: 2018-12-19

## 2019-01-09 ENCOUNTER — TELEPHONE (OUTPATIENT)
Dept: INTERNAL MEDICINE CLINIC | Facility: CLINIC | Age: 69
End: 2019-01-09

## 2019-01-22 ENCOUNTER — LAB ENCOUNTER (OUTPATIENT)
Dept: LAB | Age: 69
End: 2019-01-22
Attending: NURSE PRACTITIONER
Payer: MEDICARE

## 2019-01-22 ENCOUNTER — OFFICE VISIT (OUTPATIENT)
Dept: INTERNAL MEDICINE CLINIC | Facility: CLINIC | Age: 69
End: 2019-01-22
Payer: COMMERCIAL

## 2019-01-22 ENCOUNTER — HOSPITAL ENCOUNTER (OUTPATIENT)
Dept: GENERAL RADIOLOGY | Facility: HOSPITAL | Age: 69
Discharge: HOME | End: 2019-01-22
Attending: NURSE PRACTITIONER
Payer: MEDICARE

## 2019-01-22 VITALS
BODY MASS INDEX: 30.82 KG/M2 | RESPIRATION RATE: 16 BRPM | HEIGHT: 65 IN | WEIGHT: 185 LBS | SYSTOLIC BLOOD PRESSURE: 120 MMHG | TEMPERATURE: 98 F | DIASTOLIC BLOOD PRESSURE: 70 MMHG | HEART RATE: 60 BPM

## 2019-01-22 DIAGNOSIS — E04.1 NODULAR THYROID DISEASE: ICD-10-CM

## 2019-01-22 DIAGNOSIS — Z96.1 PSEUDOPHAKIA: ICD-10-CM

## 2019-01-22 DIAGNOSIS — H35.372 EPIRETINAL MEMBRANE (ERM) OF LEFT EYE: ICD-10-CM

## 2019-01-22 DIAGNOSIS — F41.9 ANXIETY: ICD-10-CM

## 2019-01-22 DIAGNOSIS — H35.062: ICD-10-CM

## 2019-01-22 DIAGNOSIS — H35.372 EPIRETINAL MEMBRANE (ERM) OF LEFT EYE: Primary | ICD-10-CM

## 2019-01-22 DIAGNOSIS — E11.9 TYPE 2 DIABETES MELLITUS WITHOUT COMPLICATION, WITHOUT LONG-TERM CURRENT USE OF INSULIN (HCC): ICD-10-CM

## 2019-01-22 DIAGNOSIS — E78.00 PURE HYPERCHOLESTEROLEMIA: ICD-10-CM

## 2019-01-22 PROBLEM — E78.2 MIXED HYPERLIPIDEMIA: Status: RESOLVED | Noted: 2017-04-17 | Resolved: 2019-01-22

## 2019-01-22 PROBLEM — M85.80 OSTEOPENIA, UNSPECIFIED LOCATION: Status: RESOLVED | Noted: 2017-04-17 | Resolved: 2019-01-22

## 2019-01-22 LAB
ALBUMIN SERPL-MCNC: 3.6 G/DL (ref 3.1–4.5)
ALBUMIN/GLOB SERPL: 1 {RATIO} (ref 1–2)
ALP LIVER SERPL-CCNC: 76 U/L (ref 55–142)
ALT SERPL-CCNC: 27 U/L (ref 14–54)
ANION GAP SERPL CALC-SCNC: 5 MMOL/L (ref 0–18)
AST SERPL-CCNC: 18 U/L (ref 15–41)
BASOPHILS # BLD AUTO: 0.07 X10(3) UL (ref 0–0.1)
BASOPHILS NFR BLD AUTO: 1.2 %
BILIRUB SERPL-MCNC: 0.3 MG/DL (ref 0.1–2)
BUN BLD-MCNC: 19 MG/DL (ref 8–20)
BUN/CREAT SERPL: 31.7 (ref 10–20)
CALCIUM BLD-MCNC: 9.3 MG/DL (ref 8.3–10.3)
CHLORIDE SERPL-SCNC: 105 MMOL/L (ref 101–111)
CO2 SERPL-SCNC: 28 MMOL/L (ref 22–32)
CREAT BLD-MCNC: 0.6 MG/DL (ref 0.55–1.02)
EOSINOPHIL # BLD AUTO: 0.19 X10(3) UL (ref 0–0.3)
EOSINOPHIL NFR BLD AUTO: 3.2 %
ERYTHROCYTE [DISTWIDTH] IN BLOOD BY AUTOMATED COUNT: 13.5 % (ref 11.5–16)
GLOBULIN PLAS-MCNC: 3.7 G/DL (ref 2.8–4.4)
GLUCOSE BLD-MCNC: 113 MG/DL (ref 70–99)
HCT VFR BLD AUTO: 44.2 % (ref 34–50)
HGB BLD-MCNC: 13.8 G/DL (ref 12–16)
IMMATURE GRANULOCYTE COUNT: 0.02 X10(3) UL (ref 0–1)
IMMATURE GRANULOCYTE RATIO %: 0.3 %
LYMPHOCYTES # BLD AUTO: 1.97 X10(3) UL (ref 0.9–4)
LYMPHOCYTES NFR BLD AUTO: 32.7 %
M PROTEIN MFR SERPL ELPH: 7.3 G/DL (ref 6.4–8.2)
MCH RBC QN AUTO: 27.7 PG (ref 27–33.2)
MCHC RBC AUTO-ENTMCNC: 31.2 G/DL (ref 31–37)
MCV RBC AUTO: 88.8 FL (ref 81–100)
MONOCYTES # BLD AUTO: 0.43 X10(3) UL (ref 0.1–1)
MONOCYTES NFR BLD AUTO: 7.1 %
NEUTROPHIL ABS PRELIM: 3.35 X10 (3) UL (ref 1.3–6.7)
NEUTROPHILS # BLD AUTO: 3.35 X10(3) UL (ref 1.3–6.7)
NEUTROPHILS NFR BLD AUTO: 55.5 %
OSMOLALITY SERPL CALC.SUM OF ELEC: 289 MOSM/KG (ref 275–295)
PLATELET # BLD AUTO: 218 10(3)UL (ref 150–450)
POTASSIUM SERPL-SCNC: 5.1 MMOL/L (ref 3.6–5.1)
RBC # BLD AUTO: 4.98 X10(6)UL (ref 3.8–5.1)
RED CELL DISTRIBUTION WIDTH-SD: 43.8 FL (ref 35.1–46.3)
SODIUM SERPL-SCNC: 138 MMOL/L (ref 136–144)
WBC # BLD AUTO: 6 X10(3) UL (ref 4–13)

## 2019-01-22 PROCEDURE — 84075 ASSAY ALKALINE PHOSPHATASE: CPT

## 2019-01-22 PROCEDURE — 93000 ELECTROCARDIOGRAM COMPLETE: CPT | Performed by: NURSE PRACTITIONER

## 2019-01-22 PROCEDURE — 71046 X-RAY EXAM CHEST 2 VIEWS: CPT | Performed by: NURSE PRACTITIONER

## 2019-01-22 PROCEDURE — 85025 COMPLETE CBC W/AUTO DIFF WBC: CPT

## 2019-01-22 PROCEDURE — 99214 OFFICE O/P EST MOD 30 MIN: CPT | Performed by: NURSE PRACTITIONER

## 2019-01-22 PROCEDURE — 84080 ASSAY ALKALINE PHOSPHATASES: CPT

## 2019-01-22 PROCEDURE — 80053 COMPREHEN METABOLIC PANEL: CPT

## 2019-01-22 RX ORDER — TOLTERODINE 2 MG/1
2 CAPSULE, EXTENDED RELEASE ORAL
Qty: 90 CAPSULE | Refills: 1 | Status: SHIPPED | OUTPATIENT
Start: 2019-01-22 | End: 2019-06-18

## 2019-01-25 ENCOUNTER — TELEPHONE (OUTPATIENT)
Dept: INTERNAL MEDICINE CLINIC | Facility: CLINIC | Age: 69
End: 2019-01-25

## 2019-01-25 LAB
ALK-PHOSPHATASE BONE CALC: 30 U/L
ALK-PHOSPHATASE LIVER CALC: 49 U/L
ALK-PHOSPHATASE OTHER CALC: 0 U/L
ALKALINE PHOSPHATASE: 79 U/L

## 2019-01-25 NOTE — TELEPHONE ENCOUNTER
Nicolas at [facility] office notified SD would not be back in the office until Tues 1/29/19. Nicolas asks if we can fax info by Wed 1/30/19. Hold for SD. Paperwork at [facility].

## 2019-01-25 NOTE — TELEPHONE ENCOUNTER
Pt had a preop on 1/22/19 they are still waiting for the info to be faxed to them. She stated labs and EKG to be included. She also stated she will need an MD to sign the documents as well. Sometimes the hospitals give them a hard time.  Please fax Brad Jasso

## 2019-02-15 DIAGNOSIS — R92.1 BREAST CALCIFICATION, RIGHT: ICD-10-CM

## 2019-02-15 DIAGNOSIS — N60.91 ATYPICAL DUCTAL HYPERPLASIA OF RIGHT BREAST: Primary | ICD-10-CM

## 2019-02-15 NOTE — PROGRESS NOTES
HPI:    Patient ID: Kirby Aschoff is a 80year old female. Received a call from mammography asked for US order with currently scheduled diagnostic mammogram.   Order placed. Review of Systems         Current Outpatient Medications:   Tolterod

## 2019-02-19 ENCOUNTER — HOSPITAL ENCOUNTER (OUTPATIENT)
Dept: MAMMOGRAPHY | Facility: HOSPITAL | Age: 69
Discharge: HOME | End: 2019-02-19
Attending: SURGERY
Payer: MEDICARE

## 2019-02-19 DIAGNOSIS — R92.1 BREAST CALCIFICATION, RIGHT: ICD-10-CM

## 2019-02-19 PROCEDURE — 77066 DX MAMMO INCL CAD BI: CPT | Performed by: SURGERY

## 2019-02-19 PROCEDURE — 77062 BREAST TOMOSYNTHESIS BI: CPT | Performed by: SURGERY

## 2019-04-02 ENCOUNTER — HOSPITAL ENCOUNTER (OUTPATIENT)
Dept: CT IMAGING | Facility: HOSPITAL | Age: 69
Discharge: HOME | End: 2019-04-02
Attending: INTERNAL MEDICINE

## 2019-04-02 DIAGNOSIS — Z13.9 ENCOUNTER FOR SCREENING: ICD-10-CM

## 2019-04-03 ENCOUNTER — MED REC SCAN ONLY (OUTPATIENT)
Dept: INTERNAL MEDICINE CLINIC | Facility: CLINIC | Age: 69
End: 2019-04-03

## 2019-05-28 ENCOUNTER — TELEPHONE (OUTPATIENT)
Dept: INTERNAL MEDICINE CLINIC | Facility: CLINIC | Age: 69
End: 2019-05-28

## 2019-05-28 NOTE — TELEPHONE ENCOUNTER
DM eye exam received dated 5/28/19 completed by Marybel Plata MD at the [facility] no DM retinopathy progression noted in report abstracted and placed in JL's bin to be reviewed

## 2019-06-05 ENCOUNTER — TELEPHONE (OUTPATIENT)
Dept: INTERNAL MEDICINE CLINIC | Facility: CLINIC | Age: 69
End: 2019-06-05

## 2019-06-05 NOTE — TELEPHONE ENCOUNTER
Please place fasting orders through Andrew Brain for [employer] Visit  Future Appointments   Date Time Provider Dahlia Hutchins   6/18/2019  3:15 PM JAYCOB Calderón [facility]   7/9/2019 10:15 AM Geraldine Murdock MD [facility] Surg/O

## 2019-06-05 NOTE — TELEPHONE ENCOUNTER
SD-patient recently had labs completed in 01/2019 & 03/25/2019 ordered by Dr. Greyson Kasper, would you like patient to complete any labs prior to upcoming supervisit? Please advise.

## 2019-06-15 ENCOUNTER — MA CHART PREP (OUTPATIENT)
Dept: FAMILY MEDICINE CLINIC | Facility: CLINIC | Age: 69
End: 2019-06-15

## 2019-06-15 PROBLEM — E55.9 VITAMIN D DEFICIENCY: Status: ACTIVE | Noted: 2019-06-15

## 2019-06-18 ENCOUNTER — OFFICE VISIT (OUTPATIENT)
Dept: INTERNAL MEDICINE CLINIC | Facility: CLINIC | Age: 69
End: 2019-06-18
Payer: COMMERCIAL

## 2019-06-18 VITALS
DIASTOLIC BLOOD PRESSURE: 60 MMHG | HEART RATE: 88 BPM | HEIGHT: 65 IN | SYSTOLIC BLOOD PRESSURE: 92 MMHG | BODY MASS INDEX: 30.16 KG/M2 | WEIGHT: 181 LBS | TEMPERATURE: 98 F

## 2019-06-18 DIAGNOSIS — N60.91 ATYPICAL DUCTAL HYPERPLASIA OF RIGHT BREAST: ICD-10-CM

## 2019-06-18 DIAGNOSIS — R92.0 MICROCALCIFICATION OF RIGHT BREAST ON MAMMOGRAM: ICD-10-CM

## 2019-06-18 DIAGNOSIS — H35.372 EPIRETINAL MEMBRANE (ERM) OF LEFT EYE: ICD-10-CM

## 2019-06-18 DIAGNOSIS — E66.09 CLASS 1 OBESITY DUE TO EXCESS CALORIES WITH SERIOUS COMORBIDITY AND BODY MASS INDEX (BMI) OF 30.0 TO 30.9 IN ADULT: ICD-10-CM

## 2019-06-18 DIAGNOSIS — E78.00 PURE HYPERCHOLESTEROLEMIA: ICD-10-CM

## 2019-06-18 DIAGNOSIS — E11.9 TYPE 2 DIABETES MELLITUS WITHOUT COMPLICATION, WITHOUT LONG-TERM CURRENT USE OF INSULIN (HCC): ICD-10-CM

## 2019-06-18 DIAGNOSIS — E04.1 NODULAR THYROID DISEASE: ICD-10-CM

## 2019-06-18 DIAGNOSIS — E55.9 VITAMIN D DEFICIENCY: ICD-10-CM

## 2019-06-18 DIAGNOSIS — F41.9 ANXIETY: ICD-10-CM

## 2019-06-18 DIAGNOSIS — M62.89 PELVIC FLOOR DYSFUNCTION IN FEMALE: ICD-10-CM

## 2019-06-18 DIAGNOSIS — Z00.00 ENCOUNTER FOR ANNUAL HEALTH EXAMINATION: Primary | ICD-10-CM

## 2019-06-18 DIAGNOSIS — Z80.41 FAMILY HISTORY OF MALIGNANT NEOPLASM OF OVARY: ICD-10-CM

## 2019-06-18 PROCEDURE — 96160 PT-FOCUSED HLTH RISK ASSMT: CPT | Performed by: NURSE PRACTITIONER

## 2019-06-18 PROCEDURE — 99397 PER PM REEVAL EST PAT 65+ YR: CPT | Performed by: NURSE PRACTITIONER

## 2019-06-18 PROCEDURE — G0439 PPPS, SUBSEQ VISIT: HCPCS | Performed by: NURSE PRACTITIONER

## 2019-06-18 RX ORDER — TOLTERODINE 2 MG/1
2 CAPSULE, EXTENDED RELEASE ORAL
Qty: 90 CAPSULE | Refills: 3 | Status: SHIPPED | OUTPATIENT
Start: 2019-06-18 | End: 2020-07-07

## 2019-06-18 NOTE — PROGRESS NOTES
HPI:   Raad Delvalle is a 80year old female who presents for a MA (Medicare Advantage) [facility] (Once per calendar year). Pure hypercholesterolemia  Low dose simvastatin   Doing well.     Labs to be done by Dr Denis Tavera with next lab draw    Nodular thyr has this document but we do not have it in [facility], and patient is instructed to get our office a copy of it for scanning into Epic. She does NOT have a Power of  for [facility] on file in [facility]    The patient has this document but we do not hav Allergies. CURRENT MEDICATIONS:     Outpatient Medications Marked as Taking for the 6/18/19 encounter (Office Visit) with JAYCOB Barrios:  Ergocalciferol (VITAMIN D OR) Take 1 tablet by mouth daily.    SITagliptin-metFORMIN HCl (JANUMET)  MG onset: 79) in her mother; CABG in her father; Cancer in her mother; Diabetes in her father; Emphysema in her mother; Heart Disease in her father and maternal grandfather; Osteoporosis in her mother;  Other in her father; Ovarian Cancer (age of onset: 79) in drainage or sinus tenderness   Throat: Lips, mucosa, and tongue normal; teeth and gums normal   Neck: Supple, symmetrical, trachea midline, no adenopathy;   no JVD   Back:   Symmetric, no curvature, ROM normal, no CVA tenderness   Lungs:   Clear to auscult ductal hyperplasia of right breast  Per Dr Aurora Kong.       Epiretinal membrane (ERM) of left eye  Dr Maggie Ann   Needs f/u     Family history of malignant neoplasm of ovary    Microcalcification of right breast on mammogram per Dr Kirsten Grady 1 obesity due Sigmoidoscopy Screen every 10 years No results found for this or any previous visit. No flowsheet data found. Fecal Occult Blood Annually No results found for: FOB No flowsheet data found.     Glaucoma Screening      Ophthalmology Visit Annually: Fredy Kim your pharmacy  prescription benefits      SPECIFIC DISEASE MONITORING Internal Lab or Procedure External Lab or Procedure      Annual Monitoring of Persistent     Medications (ACE/ARB, digoxin diuretics, anticonvulsants.)    Potassium  Annually Potassium (

## 2019-06-18 NOTE — PATIENT INSTRUCTIONS
Lauri Vogel's SCREENING SCHEDULE   Tests on this list are recommended by your physician but may not be covered, or covered at this frequency, by your insurer. Please check with your insurance carrier before scheduling to verify coverage.    PREVENTATI any previous visit.  Limited to patients who meet one of the following criteria:   • Men who are 73-68 years old and have smoked more than 100 cigarettes in their lifetime   • Anyone with a family history    Colorectal Cancer Screening  Covered up to Age 76 02/19/2020 Please get this Mammogram regularly   Immunizations      Influenza  Covered Annually Orders placed or performed in visit on 09/23/16   • FLU VAC NO PRSV 4 JHON 3 YRS+   Orders placed or performed in visit on 09/18/14   • FLU VAC NO PRSV 4 JHON 3 Y forms available on it's website for anyone to review and print using their home computer and printer. (the forms are also available in [address])  [email or web address]  This link also has information from the [address] regarding Advance

## 2019-07-09 ENCOUNTER — OFFICE VISIT (OUTPATIENT)
Dept: SURGERY | Facility: CLINIC | Age: 69
End: 2019-07-09
Payer: COMMERCIAL

## 2019-07-09 VITALS
RESPIRATION RATE: 16 BRPM | SYSTOLIC BLOOD PRESSURE: 127 MMHG | BODY MASS INDEX: 30.35 KG/M2 | HEART RATE: 64 BPM | OXYGEN SATURATION: 100 % | WEIGHT: 182.19 LBS | HEIGHT: 65 IN | DIASTOLIC BLOOD PRESSURE: 74 MMHG

## 2019-07-09 DIAGNOSIS — Z12.39 BREAST CANCER SCREENING, HIGH RISK PATIENT: ICD-10-CM

## 2019-07-09 DIAGNOSIS — N60.81 FLAT EPITHELIAL ATYPIA (FEA) OF RIGHT BREAST: Primary | ICD-10-CM

## 2019-07-09 DIAGNOSIS — N60.91 ATYPICAL DUCTAL HYPERPLASIA OF RIGHT BREAST: ICD-10-CM

## 2019-07-09 PROCEDURE — 99214 OFFICE O/P EST MOD 30 MIN: CPT | Performed by: SURGERY

## 2019-07-09 NOTE — PROGRESS NOTES
Breast Surgery Follow-Up Visit    Diagnosis: ADH/FEA of the Right breast status post Right breast excisional biopsy with preoperative wire localization on November 10, 2015. Stage: N/A    Disease Status:   Surgical therapy complete. History:    This Date   • BREAST BIOPSY Right 11/10/15    excisional bx of right breast   • BREAST BIOPSY NEEDLE LOCALIZATION Right 11/10/2015    Performed by Ben Brush MD at [facility] MAIN OR   • [facility]  1/1/1968    Biopsy breast open, LEFT NE Tab Take 1 tablet (5 mg total) by mouth daily. Disp: 90 tablet Rfl: 3   Glucose Blood In Vitro Strip Test 2 times daily Disp: 200 each Rfl: 3   B Complex Vitamins (VITAMIN B COMPLEX OR) Take by mouth daily.    Disp:  Rfl:    aspirin (ECOTRIN LOW STRENGTH) 8 Cardiovascular: There is no history of chest pain, chest pressure/discomfort, palpitations, irregular heartbeat, fainting or near-fainting, difficulty breathing when lying flat, SOB/Coughing at night, swelling of the legs or chest pain while walking. 82.6 kg (182 lb 3.2 oz)   SpO2 100%   BMI 30.32 kg/m²      Physical Examination: This is a well-nourished, alert and oriented woman. There is not palpable cervical, supraclavicular or axillary adenopathy.   The neck is supple with a midline trachea and no breast biopsy, as well as additional family members that may be diagnosed with breast cancer. We then turned our discussion towards genetic counseling and testing as her likelihood for carrying a mutation is Low.   Based on the findings, I would not discussion of management options.

## 2019-07-09 NOTE — PATIENT INSTRUCTIONS
Screening mammogram due February 2020. Call [phone] to schedule. Follow up with Dr Leonora Mcgee for a clinical breast exam 1-2 weeks after mammogram is completed.

## 2019-08-06 ENCOUNTER — TELEPHONE (OUTPATIENT)
Dept: INTERNAL MEDICINE CLINIC | Facility: CLINIC | Age: 69
End: 2019-08-06

## 2019-11-08 ENCOUNTER — TELEPHONE (OUTPATIENT)
Dept: INTERNAL MEDICINE CLINIC | Facility: CLINIC | Age: 69
End: 2019-11-08

## 2019-11-08 NOTE — TELEPHONE ENCOUNTER
Received fax from [address] [place] [address]. with result of recent Eye Exam. Abstracted results and copy sent to scan.

## 2020-02-26 ENCOUNTER — TELEPHONE (OUTPATIENT)
Dept: INTERNAL MEDICINE CLINIC | Facility: CLINIC | Age: 70
End: 2020-02-26

## 2020-02-26 NOTE — TELEPHONE ENCOUNTER
Fax received from [address] [place] [address]. Eye exam abstracted. Placed in [address] [place] [address] for review.

## 2020-03-10 ENCOUNTER — HOSPITAL ENCOUNTER (OUTPATIENT)
Dept: MAMMOGRAPHY | Facility: HOSPITAL | Age: 70
Discharge: HOME | End: 2020-03-10
Attending: SURGERY
Payer: MEDICARE

## 2020-03-10 DIAGNOSIS — Z12.39 BREAST CANCER SCREENING, HIGH RISK PATIENT: ICD-10-CM

## 2020-03-10 PROCEDURE — 77063 BREAST TOMOSYNTHESIS BI: CPT | Performed by: SURGERY

## 2020-03-10 PROCEDURE — 77067 SCR MAMMO BI INCL CAD: CPT | Performed by: SURGERY

## 2020-04-23 ENCOUNTER — TELEPHONE (OUTPATIENT)
Dept: INTERNAL MEDICINE CLINIC | Facility: CLINIC | Age: 70
End: 2020-04-23

## 2020-04-23 DIAGNOSIS — E78.00 PURE HYPERCHOLESTEROLEMIA: ICD-10-CM

## 2020-04-23 DIAGNOSIS — Z00.00 ROUTINE GENERAL MEDICAL EXAMINATION AT A HEALTH CARE FACILITY: Primary | ICD-10-CM

## 2020-04-23 DIAGNOSIS — E04.1 NODULAR THYROID DISEASE: ICD-10-CM

## 2020-04-23 NOTE — TELEPHONE ENCOUNTER
Pt wants to get her labs done at [facility]. Pt has orders already in system and would like to know if there are any other labs SD wants her to do. Please advise.     Orders to [facility]/External Pt aware to get labs done no sooner than 2 weeks prior to the ap

## 2020-05-06 NOTE — TELEPHONE ENCOUNTER
Pt has A1c, CMP, and Vit D pending from 1/23/20 ordered by Dr. Jorge Peña. Pending screening labs for external lab per pt request.   Please sign if appropriate.

## 2020-06-03 ENCOUNTER — TELEPHONE (OUTPATIENT)
Dept: INTERNAL MEDICINE CLINIC | Facility: CLINIC | Age: 70
End: 2020-06-03

## 2020-06-03 NOTE — TELEPHONE ENCOUNTER
Received fax from [facility] with f/up from repaired epiretinal membrane. Placed in [address] [place] [address] for review.

## 2020-06-30 ENCOUNTER — TELEPHONE (OUTPATIENT)
Dept: INTERNAL MEDICINE CLINIC | Facility: CLINIC | Age: 70
End: 2020-06-30

## 2020-06-30 NOTE — TELEPHONE ENCOUNTER
Received fax from [facility] with result of recent labs. Abstracted results and copy sent to scan.

## 2020-07-01 ENCOUNTER — TELEPHONE (OUTPATIENT)
Dept: INTERNAL MEDICINE CLINIC | Facility: CLINIC | Age: 70
End: 2020-07-01

## 2020-07-01 NOTE — TELEPHONE ENCOUNTER
Patient states she had contact with someone 6/18/2018; patient denies s/s. Patient advised to monitor temperature s/s and temperatures daily for 14 days. Patient will contact if any new changes in s/s or temperatures.  Patient states her  was covid t

## 2020-07-01 NOTE — TELEPHONE ENCOUNTER
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

## 2020-07-07 ENCOUNTER — MED REC SCAN ONLY (OUTPATIENT)
Dept: INTERNAL MEDICINE CLINIC | Facility: CLINIC | Age: 70
End: 2020-07-07

## 2020-07-07 ENCOUNTER — OFFICE VISIT (OUTPATIENT)
Dept: INTERNAL MEDICINE CLINIC | Facility: CLINIC | Age: 70
End: 2020-07-07
Payer: COMMERCIAL

## 2020-07-07 VITALS
BODY MASS INDEX: 29.93 KG/M2 | TEMPERATURE: 97 F | HEIGHT: 65 IN | HEART RATE: 70 BPM | RESPIRATION RATE: 16 BRPM | DIASTOLIC BLOOD PRESSURE: 64 MMHG | WEIGHT: 179.63 LBS | SYSTOLIC BLOOD PRESSURE: 116 MMHG | OXYGEN SATURATION: 98 %

## 2020-07-07 DIAGNOSIS — L57.0 ACTINIC KERATOSIS: ICD-10-CM

## 2020-07-07 DIAGNOSIS — N60.91 ATYPICAL DUCTAL HYPERPLASIA OF RIGHT BREAST: ICD-10-CM

## 2020-07-07 DIAGNOSIS — R92.0 MICROCALCIFICATION OF RIGHT BREAST ON MAMMOGRAM: ICD-10-CM

## 2020-07-07 DIAGNOSIS — E11.9 TYPE 2 DIABETES MELLITUS WITHOUT COMPLICATION, WITHOUT LONG-TERM CURRENT USE OF INSULIN (HCC): ICD-10-CM

## 2020-07-07 DIAGNOSIS — E55.9 VITAMIN D DEFICIENCY: ICD-10-CM

## 2020-07-07 DIAGNOSIS — N60.81 FLAT EPITHELIAL ATYPIA (FEA) OF RIGHT BREAST: ICD-10-CM

## 2020-07-07 DIAGNOSIS — Z00.00 ENCOUNTER FOR ANNUAL HEALTH EXAMINATION: Primary | ICD-10-CM

## 2020-07-07 DIAGNOSIS — F41.9 ANXIETY: ICD-10-CM

## 2020-07-07 DIAGNOSIS — H35.372 EPIRETINAL MEMBRANE (ERM) OF LEFT EYE: ICD-10-CM

## 2020-07-07 DIAGNOSIS — E04.1 NODULAR THYROID DISEASE: ICD-10-CM

## 2020-07-07 DIAGNOSIS — Z11.59 NEED FOR HEPATITIS C SCREENING TEST: ICD-10-CM

## 2020-07-07 DIAGNOSIS — E78.00 PURE HYPERCHOLESTEROLEMIA: ICD-10-CM

## 2020-07-07 DIAGNOSIS — Z80.41 FAMILY HISTORY OF MALIGNANT NEOPLASM OF OVARY: ICD-10-CM

## 2020-07-07 PROCEDURE — 96160 PT-FOCUSED HLTH RISK ASSMT: CPT | Performed by: NURSE PRACTITIONER

## 2020-07-07 PROCEDURE — G0439 PPPS, SUBSEQ VISIT: HCPCS | Performed by: NURSE PRACTITIONER

## 2020-07-07 PROCEDURE — 99397 PER PM REEVAL EST PAT 65+ YR: CPT | Performed by: NURSE PRACTITIONER

## 2020-07-07 RX ORDER — TOLTERODINE 2 MG/1
2 CAPSULE, EXTENDED RELEASE ORAL
Qty: 90 CAPSULE | Refills: 3 | Status: SHIPPED | OUTPATIENT
Start: 2020-07-07 | End: 2021-05-05

## 2020-07-07 NOTE — PATIENT INSTRUCTIONS
Luke Vogel's SCREENING SCHEDULE   Tests on this list are recommended by your physician but may not be covered, or covered at this frequency, by your insurer. Please check with your insurance carrier before scheduling to verify coverage.    PREVENTATI screening (once between ages 73-68) IPPE only No results found for this or any previous visit.  Limited to patients who meet one of the following criteria:   • Men who are 73-68 years old and have smoked more than 100 cigarettes in their lifetime   • Anyone Recommend Annually to at least age 76, and as needed after 76 Mammogram due on 03/10/2021 Please get this Mammogram regularly   Immunizations      Influenza  Covered Annually Orders placed or performed in visit on 09/23/19   • FLU VACC HIGH DOSE PRSV [place] [employer]. This site has a lot of good information including definitions of the different types of Advance Directives.  It also has the State forms available on it's website for anyone to review and print using their home computer and p

## 2020-07-07 NOTE — PROGRESS NOTES
HPI:   Aydin Austin is a 77year old female who presents for a MA (Medicare Advantage) Supervisit (Once per calendar year).   Pure hypercholesterolemia  Simvastatin 20mg  LDL <100    Encounter for annual health examination    Need for hepatitis C bobby in [address] [place] [address] Rd. The patient has this document but we do not have it in [facility], and patient is instructed to get our office a copy of it for scanning into Epic. She does have a POA but we do NOT have it on file in [address] [place] [address] Rd.    The patient has this document but WBC 6.0 01/22/2019    HGB 13.8 01/22/2019    .0 01/22/2019        ALLERGIES:   She has No Known Allergies.     CURRENT MEDICATIONS:   Glucose Blood In Vitro Strip, Test 2 times daily  JARDIANCE 25 MG Oral Tab, TAKE 1 TABLET BY MOUTH  DAILY  JANUMET 5 her mother; CABG in her father; Cancer in her mother; Diabetes in her father; Emphysema in her mother; Heart Disease in her father and maternal grandfather; Osteoporosis in her mother;  Other in her father; Ovarian Cancer (age of onset: 79) in her mother; R no CVA tenderness   Lungs:   Clear to auscultation bilaterally, respirations unlabored   Heart:  Regular rate and rhythm, S1 and S2 normal,   Abdomen:   Soft, non-tender, bowel sounds active all four quadrants,  no masses, no organomegaly   Pelvic: Deferre of ovary  Monitor     Vitamin D deficiency  Cont supplement. Actinic keratosis  Follows w derm  Has upcoming appt. 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       Microcalcification of right breast on mammo Electrocardiogram date01/22/2019       Colorectal Cancer Screening      Colonoscopy Screen every 10 years Colonoscopy due on 12/08/2024 Update Health Maintenance if applicable    Flex Sigmoidoscopy Screen every 10 years No results found for this or any pre covered with your prescription benefits, but Medicare does not cover unless Medically needed    Zoster  Discussed shingrix.    Not covered by Medicare Part B No vaccine history found This may be covered with your pharmacy  prescription benefits      SPECIFI

## 2020-08-12 ENCOUNTER — TELEPHONE (OUTPATIENT)
Dept: INTERNAL MEDICINE CLINIC | Facility: CLINIC | Age: 70
End: 2020-08-12

## 2020-08-12 NOTE — TELEPHONE ENCOUNTER
Screening codes were used initially. Diabetes and high chol listed  Corrected to these ICD 10 codes.

## 2020-08-12 NOTE — TELEPHONE ENCOUNTER
Received fax from [facility] with attached paperwork regarding diagnosis code is not covered by patients insurance. Placed in SD bin to complete and sign.

## 2020-08-12 NOTE — TELEPHONE ENCOUNTER
Placed papers in enveloped that was enclosed and placed in outgoing mail. Sent copy of completed paper work to scan.

## 2020-08-28 ENCOUNTER — TELEPHONE (OUTPATIENT)
Dept: SURGERY | Facility: CLINIC | Age: 70
End: 2020-08-28

## 2020-08-28 NOTE — TELEPHONE ENCOUNTER
Returning pt phone call regarding when she is due for her next mammogram and office visit. No answer, LVM after verifying verbal release. Informed pt that I was going to clarify this with Dr. Del Toro Dear and would reach out to her again with an answer.   Encou

## 2020-08-31 DIAGNOSIS — Z12.39 ENCOUNTER FOR OTHER SCREENING FOR MALIGNANT NEOPLASM OF BREAST: ICD-10-CM

## 2020-08-31 DIAGNOSIS — Z12.39 BREAST CANCER SCREENING, HIGH RISK PATIENT: Primary | ICD-10-CM

## 2020-09-22 ENCOUNTER — TELEPHONE (OUTPATIENT)
Dept: INTERNAL MEDICINE CLINIC | Facility: CLINIC | Age: 70
End: 2020-09-22

## 2020-09-22 NOTE — TELEPHONE ENCOUNTER
Pt will be dropping off PW from her eye specialist for SD review.  Pt dropped off PW, gave to SD for review     Future Appointments   Date Time Provider Dahlia Hutchins   9/24/2020 10:40 AM JAYCOB Power [facility]   1/6/2021  9:30 AM Winnie

## 2020-09-24 ENCOUNTER — OFFICE VISIT (OUTPATIENT)
Dept: INTERNAL MEDICINE CLINIC | Facility: CLINIC | Age: 70
End: 2020-09-24
Payer: COMMERCIAL

## 2020-09-24 ENCOUNTER — HOSPITAL ENCOUNTER (OUTPATIENT)
Dept: GENERAL RADIOLOGY | Facility: HOSPITAL | Age: 70
Discharge: HOME | End: 2020-09-24
Attending: NURSE PRACTITIONER
Payer: MEDICARE

## 2020-09-24 ENCOUNTER — LAB ENCOUNTER (OUTPATIENT)
Dept: LAB | Age: 70
End: 2020-09-24
Attending: NURSE PRACTITIONER
Payer: MEDICARE

## 2020-09-24 VITALS
DIASTOLIC BLOOD PRESSURE: 62 MMHG | BODY MASS INDEX: 29.15 KG/M2 | SYSTOLIC BLOOD PRESSURE: 104 MMHG | HEIGHT: 66 IN | WEIGHT: 181.38 LBS | TEMPERATURE: 98 F | HEART RATE: 74 BPM

## 2020-09-24 DIAGNOSIS — Z11.1 SCREENING-PULMONARY TB: ICD-10-CM

## 2020-09-24 DIAGNOSIS — E04.1 NODULAR THYROID DISEASE: ICD-10-CM

## 2020-09-24 DIAGNOSIS — E78.00 PURE HYPERCHOLESTEROLEMIA: ICD-10-CM

## 2020-09-24 DIAGNOSIS — Z23 NEED FOR INFLUENZA VACCINATION: ICD-10-CM

## 2020-09-24 DIAGNOSIS — E55.9 VITAMIN D DEFICIENCY: ICD-10-CM

## 2020-09-24 DIAGNOSIS — N60.91 ATYPICAL DUCTAL HYPERPLASIA OF RIGHT BREAST: ICD-10-CM

## 2020-09-24 DIAGNOSIS — H20.9 ANTERIOR UVEITIS: ICD-10-CM

## 2020-09-24 DIAGNOSIS — E78.2 MIXED HYPERLIPIDEMIA: ICD-10-CM

## 2020-09-24 DIAGNOSIS — H35.372 EPIRETINAL MEMBRANE (ERM) OF LEFT EYE: ICD-10-CM

## 2020-09-24 DIAGNOSIS — E11.9 TYPE 2 DIABETES MELLITUS WITHOUT COMPLICATION, WITHOUT LONG-TERM CURRENT USE OF INSULIN (HCC): ICD-10-CM

## 2020-09-24 DIAGNOSIS — H20.9 ANTERIOR UVEITIS: Primary | ICD-10-CM

## 2020-09-24 DIAGNOSIS — M85.80 OSTEOPENIA, UNSPECIFIED LOCATION: ICD-10-CM

## 2020-09-24 LAB
ALBUMIN SERPL-MCNC: 3.9 G/DL (ref 3.4–5)
ALBUMIN/GLOB SERPL: 1.1 {RATIO} (ref 1–2)
ALP LIVER SERPL-CCNC: 75 U/L
ALT SERPL-CCNC: 28 U/L
ANION GAP SERPL CALC-SCNC: 4 MMOL/L (ref 0–18)
AST SERPL-CCNC: 18 U/L (ref 15–37)
BASOPHILS # BLD AUTO: 0.07 X10(3) UL (ref 0–0.2)
BASOPHILS NFR BLD AUTO: 1.1 %
BILIRUB SERPL-MCNC: 0.3 MG/DL (ref 0.1–2)
BILIRUB UR QL STRIP.AUTO: NEGATIVE
BUN BLD-MCNC: 19 MG/DL (ref 7–18)
BUN/CREAT SERPL: 29.7 (ref 10–20)
CALCIUM BLD-MCNC: 10.2 MG/DL (ref 8.5–10.1)
CHLORIDE SERPL-SCNC: 104 MMOL/L (ref 98–112)
CO2 SERPL-SCNC: 30 MMOL/L (ref 21–32)
COLOR UR AUTO: YELLOW
CREAT BLD-MCNC: 0.64 MG/DL
DEPRECATED RDW RBC AUTO: 43.7 FL (ref 35.1–46.3)
EOSINOPHIL # BLD AUTO: 0.24 X10(3) UL (ref 0–0.7)
EOSINOPHIL NFR BLD AUTO: 3.7 %
ERYTHROCYTE [DISTWIDTH] IN BLOOD BY AUTOMATED COUNT: 13.5 % (ref 11–15)
EST. AVERAGE GLUCOSE BLD GHB EST-MCNC: 163 MG/DL (ref 68–126)
GLOBULIN PLAS-MCNC: 3.6 G/DL (ref 2.8–4.4)
GLUCOSE BLD-MCNC: 136 MG/DL (ref 70–99)
GLUCOSE UR STRIP.AUTO-MCNC: >=500 MG/DL
HBA1C MFR BLD HPLC: 7.3 % (ref ?–5.7)
HCT VFR BLD AUTO: 44.9 %
HGB BLD-MCNC: 14.3 G/DL
IMM GRANULOCYTES # BLD AUTO: 0.01 X10(3) UL (ref 0–1)
IMM GRANULOCYTES NFR BLD: 0.2 %
KETONES UR STRIP.AUTO-MCNC: NEGATIVE MG/DL
LEUKOCYTE ESTERASE UR QL STRIP.AUTO: NEGATIVE
LYMPHOCYTES # BLD AUTO: 2.04 X10(3) UL (ref 1–4)
LYMPHOCYTES NFR BLD AUTO: 31.8 %
M PROTEIN MFR SERPL ELPH: 7.5 G/DL (ref 6.4–8.2)
MCH RBC QN AUTO: 27.9 PG (ref 26–34)
MCHC RBC AUTO-ENTMCNC: 31.8 G/DL (ref 31–37)
MCV RBC AUTO: 87.7 FL
MONOCYTES # BLD AUTO: 0.45 X10(3) UL (ref 0.1–1)
MONOCYTES NFR BLD AUTO: 7 %
NEUTROPHILS # BLD AUTO: 3.61 X10 (3) UL (ref 1.5–7.7)
NEUTROPHILS # BLD AUTO: 3.61 X10(3) UL (ref 1.5–7.7)
NEUTROPHILS NFR BLD AUTO: 56.2 %
NITRITE UR QL STRIP.AUTO: NEGATIVE
OSMOLALITY SERPL CALC.SUM OF ELEC: 290 MOSM/KG (ref 275–295)
PATIENT FASTING Y/N/NP: NO
PH UR STRIP.AUTO: 5 [PH] (ref 4.5–8)
PLATELET # BLD AUTO: 223 10(3)UL (ref 150–450)
POTASSIUM SERPL-SCNC: 4.5 MMOL/L (ref 3.5–5.1)
PROT UR STRIP.AUTO-MCNC: NEGATIVE MG/DL
RBC # BLD AUTO: 5.12 X10(6)UL
RBC UR QL AUTO: NEGATIVE
SED RATE-ML: 8 MM/HR
SODIUM SERPL-SCNC: 138 MMOL/L (ref 136–145)
SP GR UR STRIP.AUTO: 1.03 (ref 1–1.03)
T PALLIDUM AB SER QL IA: NONREACTIVE
UROBILINOGEN UR STRIP.AUTO-MCNC: <2 MG/DL
WBC # BLD AUTO: 6.4 X10(3) UL (ref 4–11)

## 2020-09-24 PROCEDURE — 86780 TREPONEMA PALLIDUM: CPT

## 2020-09-24 PROCEDURE — 86580 TB INTRADERMAL TEST: CPT | Performed by: NURSE PRACTITIONER

## 2020-09-24 PROCEDURE — 99214 OFFICE O/P EST MOD 30 MIN: CPT | Performed by: NURSE PRACTITIONER

## 2020-09-24 PROCEDURE — 83036 HEMOGLOBIN GLYCOSYLATED A1C: CPT

## 2020-09-24 PROCEDURE — 80053 COMPREHEN METABOLIC PANEL: CPT

## 2020-09-24 PROCEDURE — 71046 X-RAY EXAM CHEST 2 VIEWS: CPT | Performed by: NURSE PRACTITIONER

## 2020-09-24 PROCEDURE — 81001 URINALYSIS AUTO W/SCOPE: CPT

## 2020-09-24 PROCEDURE — 3074F SYST BP LT 130 MM HG: CPT | Performed by: NURSE PRACTITIONER

## 2020-09-24 PROCEDURE — 3008F BODY MASS INDEX DOCD: CPT | Performed by: NURSE PRACTITIONER

## 2020-09-24 PROCEDURE — 85549 MURAMIDASE: CPT

## 2020-09-24 PROCEDURE — 3078F DIAST BP <80 MM HG: CPT | Performed by: NURSE PRACTITIONER

## 2020-09-24 NOTE — PROGRESS NOTES
Nixon Knapp is a 77year old female. Patient presents with:  Eye Problem: mn room 11 saw eye doctor on Tuesday 22nd and he would like more tests to be done       HPI:   Here for eval and follow up from her eye doctor.   Has been seeing retinal speciali STRENGTH) 81 MG Oral Tab EC Take 81 mg by mouth daily. • Multiple Vitamins-Minerals (CENTRUM) Oral Tab Take 1 tablet by mouth daily.         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

## 2020-09-25 LAB
ANA SCREEN: NEGATIVE
B BURGDOR IGG+IGM SER QL: NEGATIVE

## 2020-09-26 ENCOUNTER — NURSE ONLY (OUTPATIENT)
Dept: INTERNAL MEDICINE CLINIC | Facility: CLINIC | Age: 70
End: 2020-09-26
Payer: COMMERCIAL

## 2020-09-26 LAB — INDURATION (): 0 MM (ref 0–11)

## 2020-09-27 LAB
LYSOZYME, SERUM OR BODY FLUID: 0.64 UG/ML
TREPONEMA PALLIDUM AB, IGG BY IFA (FTA-ABS), SERUM: NON REACTIVE

## 2020-11-11 ENCOUNTER — TELEPHONE (OUTPATIENT)
Dept: INTERNAL MEDICINE CLINIC | Facility: CLINIC | Age: 70
End: 2020-11-11

## 2020-11-11 NOTE — TELEPHONE ENCOUNTER
Received fax from [facility] with attached exam notes. Abstracted for DM. Placed in SD bin to review.

## 2020-11-11 NOTE — TELEPHONE ENCOUNTER
Future Appointments   Date Time Provider Dahlia Hutchins   11/24/2020  1:00 PM Salomon Mobley, APRN [facility]   1/6/2021  9:30 AM Leonardo Copeland  [address] with SD     Pt will be having surgery with DR. Paola Hannah  Phone

## 2020-11-23 NOTE — TELEPHONE ENCOUNTER
Called Dr. Shyanne Thornton looking for our pw, pre-op tomorrow. 's office stated she tried faxing several times and it wasn't coming through - they will try and resend it again.

## 2020-11-24 ENCOUNTER — LAB ENCOUNTER (OUTPATIENT)
Dept: LAB | Age: 70
End: 2020-11-24
Attending: NURSE PRACTITIONER
Payer: MEDICARE

## 2020-11-24 ENCOUNTER — OFFICE VISIT (OUTPATIENT)
Dept: INTERNAL MEDICINE CLINIC | Facility: CLINIC | Age: 70
End: 2020-11-24
Payer: COMMERCIAL

## 2020-11-24 ENCOUNTER — TELEPHONE (OUTPATIENT)
Dept: INTERNAL MEDICINE CLINIC | Facility: CLINIC | Age: 70
End: 2020-11-24

## 2020-11-24 VITALS
SYSTOLIC BLOOD PRESSURE: 100 MMHG | TEMPERATURE: 98 F | DIASTOLIC BLOOD PRESSURE: 58 MMHG | BODY MASS INDEX: 29 KG/M2 | WEIGHT: 180 LBS | HEART RATE: 80 BPM | OXYGEN SATURATION: 98 %

## 2020-11-24 DIAGNOSIS — E78.00 PURE HYPERCHOLESTEROLEMIA: ICD-10-CM

## 2020-11-24 DIAGNOSIS — E11.9 TYPE 2 DIABETES MELLITUS WITHOUT COMPLICATION, WITHOUT LONG-TERM CURRENT USE OF INSULIN (HCC): ICD-10-CM

## 2020-11-24 DIAGNOSIS — F41.9 ANXIETY: ICD-10-CM

## 2020-11-24 DIAGNOSIS — H35.372 EPIRETINAL MEMBRANE (ERM) OF LEFT EYE: ICD-10-CM

## 2020-11-24 DIAGNOSIS — H35.372 EPIRETINAL MEMBRANE (ERM) OF LEFT EYE: Primary | ICD-10-CM

## 2020-11-24 PROCEDURE — 85025 COMPLETE CBC W/AUTO DIFF WBC: CPT

## 2020-11-24 PROCEDURE — 93000 ELECTROCARDIOGRAM COMPLETE: CPT | Performed by: NURSE PRACTITIONER

## 2020-11-24 PROCEDURE — 99214 OFFICE O/P EST MOD 30 MIN: CPT | Performed by: NURSE PRACTITIONER

## 2020-11-24 PROCEDURE — 3078F DIAST BP <80 MM HG: CPT | Performed by: NURSE PRACTITIONER

## 2020-11-24 PROCEDURE — 3074F SYST BP LT 130 MM HG: CPT | Performed by: NURSE PRACTITIONER

## 2020-11-24 PROCEDURE — 36415 COLL VENOUS BLD VENIPUNCTURE: CPT

## 2020-11-24 PROCEDURE — 80053 COMPREHEN METABOLIC PANEL: CPT

## 2020-11-24 RX ORDER — FLUOROURACIL 50 MG/G
1 CREAM TOPICAL 2 TIMES DAILY
COMMUNITY
Start: 2020-11-05 | End: 2021-04-29 | Stop reason: ALTCHOICE

## 2020-11-24 RX ORDER — PREDNISOLONE ACETATE 10 MG/ML
SUSPENSION/ DROPS OPHTHALMIC
COMMUNITY
Start: 2020-09-22 | End: 2021-04-29 | Stop reason: ALTCHOICE

## 2020-11-24 RX ORDER — TIMOLOL MALEATE 5 MG/ML
1 SOLUTION/ DROPS OPHTHALMIC 2 TIMES DAILY
COMMUNITY
Start: 2020-11-10 | End: 2021-04-29 | Stop reason: ALTCHOICE

## 2020-11-24 NOTE — PROGRESS NOTES
[facility]  PRE OP RISK ASSESSMENT    REASON FOR CONSULT: Pre-op risk assessment for surgical procedure pars plana vitrectomy planned for December 3, 2020  with Dr Alka Ochoa .     REQUESTING PHYSICIAN: Dr Jude Zavalaure: Patient presen HYSTERECTOMY  1/1/1988    WITH SINGLE OOPHERECTOMY   • SHERRY BIOPSY STEREO NODULE 1 SITE RIGHT (CPT=19081)  01/2018    fibrocystic changes   • SHERRY BIOPSY STEREO NODULE 2 SITE BILAT (CPT=19081/37199)  2007    benign   • SHERRY LOCALIZATION WIRE 1 SITE LEFT (CPT= file      Highest education level: Not on file    Occupational History      Not on file    Social Needs      Financial resource strain: Not on file      Food insecurity        Worry: Not on file        Inability: Not on file      Transportation needs • Diabetes Father    • Heart Disease Father    • Other (Other[other]) Father    • Other (CABG[other]) Father    • Other (Renal Failure[other]) Father    • Asthma Son    • Heart Disease Maternal Grandfather    • Thyroid Disorder Sister         REVIEW OF S Prescriptions      No prescriptions requested or ordered in this encounter       Imaging & Consults:  None    No follow-ups on file. There are no Patient Instructions on file for this visit.

## 2020-11-30 ENCOUNTER — LAB REQUISITION (OUTPATIENT)
Dept: LAB | Facility: HOSPITAL | Age: 70
End: 2020-11-30
Payer: MEDICARE

## 2020-11-30 DIAGNOSIS — Z01.818 ENCOUNTER FOR OTHER PREPROCEDURAL EXAMINATION: ICD-10-CM

## 2020-12-03 ENCOUNTER — LAB REQUISITION (OUTPATIENT)
Dept: LAB | Facility: HOSPITAL | Age: 70
End: 2020-12-03
Payer: MEDICARE

## 2020-12-03 DIAGNOSIS — H35.372 PUCKERING OF MACULA, LEFT EYE: ICD-10-CM

## 2020-12-03 PROCEDURE — 87070 CULTURE OTHR SPECIMN AEROBIC: CPT | Performed by: OPHTHALMOLOGY

## 2020-12-03 PROCEDURE — 87205 SMEAR GRAM STAIN: CPT | Performed by: OPHTHALMOLOGY

## 2021-02-24 ENCOUNTER — TELEPHONE (OUTPATIENT)
Dept: INTERNAL MEDICINE CLINIC | Facility: CLINIC | Age: 71
End: 2021-02-24

## 2021-02-24 DIAGNOSIS — E78.00 PURE HYPERCHOLESTEROLEMIA: ICD-10-CM

## 2021-02-24 DIAGNOSIS — E11.9 TYPE 2 DIABETES MELLITUS WITHOUT COMPLICATION, WITHOUT LONG-TERM CURRENT USE OF INSULIN (HCC): ICD-10-CM

## 2021-02-24 DIAGNOSIS — Z00.00 ROUTINE GENERAL MEDICAL EXAMINATION AT A HEALTH CARE FACILITY: Primary | ICD-10-CM

## 2021-02-24 NOTE — TELEPHONE ENCOUNTER
Annual Physical   Future Appointments   Date Time Provider Dahlia Hutchins   3/11/2021  8:20 AM [facility] RM3 [address] [facility],    3/16/2021  8:30 AM Fei Avilez MD [facility] EM[facility] Surg/Onc   4/29/2021  8:00 AM Bassem Francois MD [address] [place] [address]   5/5/2

## 2021-02-25 NOTE — TELEPHONE ENCOUNTER
Pt has pending CMP, A1c, Microalb/creat, TSH+Free T4, Vit D pending from Dr. Иван Flower    Pending lipid and CBC per protocol, please sign if appropriate.

## 2021-03-08 DIAGNOSIS — Z23 NEED FOR VACCINATION: ICD-10-CM

## 2021-04-19 ENCOUNTER — TELEPHONE (OUTPATIENT)
Dept: INTERNAL MEDICINE CLINIC | Facility: CLINIC | Age: 71
End: 2021-04-19

## 2021-04-26 ENCOUNTER — TELEPHONE (OUTPATIENT)
Dept: INTERNAL MEDICINE CLINIC | Facility: CLINIC | Age: 71
End: 2021-04-26

## 2021-04-26 NOTE — TELEPHONE ENCOUNTER
Received fax from [address] [place] [address] with attached CBC and Lipid test. Abstracted. Placed in RN bin to review.

## 2021-04-30 ENCOUNTER — HOSPITAL ENCOUNTER (OUTPATIENT)
Dept: MAMMOGRAPHY | Facility: HOSPITAL | Age: 71
Discharge: HOME | End: 2021-04-30
Attending: SURGERY
Payer: MEDICARE

## 2021-04-30 DIAGNOSIS — Z12.39 ENCOUNTER FOR OTHER SCREENING FOR MALIGNANT NEOPLASM OF BREAST: ICD-10-CM

## 2021-04-30 DIAGNOSIS — Z12.39 BREAST CANCER SCREENING, HIGH RISK PATIENT: ICD-10-CM

## 2021-04-30 PROCEDURE — 77063 BREAST TOMOSYNTHESIS BI: CPT | Performed by: SURGERY

## 2021-04-30 PROCEDURE — 77067 SCR MAMMO BI INCL CAD: CPT | Performed by: SURGERY

## 2021-05-04 NOTE — PROGRESS NOTES
Breast Surgery Follow-Up Visit    Diagnosis: ADH/FEA of the Right breast status post Right breast excisional biopsy with preoperative wire localization on November 10, 2015. Stage: N/A    Disease Status:   Surgical therapy complete. History:    This Date   • BREAST BIOPSY Right 11/10/15    excisional bx of right breast   • BREAST SURGERY PROCEDURE UNLISTED  1/1/1969    Biopsy breast open, LEFT NEGATIVE  MARKER PLACED IN BREAST   • CATARACT  1/3/2011    Cataract surgery, LEFT   • COLONOSCOPY  1/1/2005 DAILY WITH MEALS IN  THE MORNING AND IN THE  EVENING., Disp: 180 tablet, Rfl: 3  B Complex Vitamins (VITAMIN B COMPLEX OR), Take by mouth daily. , Disp: , Rfl:   aspirin (ECOTRIN LOW STRENGTH) 81 MG Oral Tab EC, Take 81 mg by mouth daily. , Disp: , Rfl: chest pressure/discomfort, palpitations, irregular heartbeat, fainting or near-fainting, difficulty breathing when lying flat, SOB/Coughing at night, swelling of the legs or chest pain while walking.     Breasts:  See history of present illness    Gastroint well-nourished, alert and oriented woman. There is not palpable cervical, supraclavicular or axillary adenopathy. The neck is supple with a midline trachea and no thyromegaly. Range of motion is good at both shoulders.  Breasts are symmetrical. The tumorec We then turned our discussion towards genetic counseling and testing as her likelihood for carrying a mutation is Low.   Based on the findings, I would not recommend Alvaro Kumari for counseling and testing unless an additional family member gerber

## 2021-05-05 ENCOUNTER — OFFICE VISIT (OUTPATIENT)
Dept: INTERNAL MEDICINE CLINIC | Facility: CLINIC | Age: 71
End: 2021-05-05
Payer: COMMERCIAL

## 2021-05-05 VITALS — DIASTOLIC BLOOD PRESSURE: 56 MMHG | HEART RATE: 64 BPM | SYSTOLIC BLOOD PRESSURE: 106 MMHG

## 2021-05-05 DIAGNOSIS — E04.1 NODULAR THYROID DISEASE: ICD-10-CM

## 2021-05-05 DIAGNOSIS — Z80.41 FAMILY HISTORY OF MALIGNANT NEOPLASM OF OVARY: ICD-10-CM

## 2021-05-05 DIAGNOSIS — H35.372 EPIRETINAL MEMBRANE (ERM) OF LEFT EYE: ICD-10-CM

## 2021-05-05 DIAGNOSIS — Z00.00 ROUTINE GENERAL MEDICAL EXAMINATION AT A HEALTH CARE FACILITY: ICD-10-CM

## 2021-05-05 DIAGNOSIS — E55.9 VITAMIN D DEFICIENCY: ICD-10-CM

## 2021-05-05 DIAGNOSIS — N60.81 FLAT EPITHELIAL ATYPIA (FEA) OF RIGHT BREAST: ICD-10-CM

## 2021-05-05 DIAGNOSIS — N60.91 ATYPICAL DUCTAL HYPERPLASIA OF RIGHT BREAST: ICD-10-CM

## 2021-05-05 DIAGNOSIS — R92.0 MICROCALCIFICATION OF RIGHT BREAST ON MAMMOGRAM: ICD-10-CM

## 2021-05-05 DIAGNOSIS — L57.0 ACTINIC KERATOSIS: ICD-10-CM

## 2021-05-05 DIAGNOSIS — E78.00 PURE HYPERCHOLESTEROLEMIA: Primary | ICD-10-CM

## 2021-05-05 DIAGNOSIS — E11.9 TYPE 2 DIABETES MELLITUS WITHOUT COMPLICATION, WITHOUT LONG-TERM CURRENT USE OF INSULIN (HCC): ICD-10-CM

## 2021-05-05 DIAGNOSIS — F41.9 ANXIETY: ICD-10-CM

## 2021-05-05 PROCEDURE — 99397 PER PM REEVAL EST PAT 65+ YR: CPT | Performed by: INTERNAL MEDICINE

## 2021-05-05 PROCEDURE — 96160 PT-FOCUSED HLTH RISK ASSMT: CPT | Performed by: INTERNAL MEDICINE

## 2021-05-05 PROCEDURE — G0439 PPPS, SUBSEQ VISIT: HCPCS | Performed by: INTERNAL MEDICINE

## 2021-05-05 PROCEDURE — 3074F SYST BP LT 130 MM HG: CPT | Performed by: INTERNAL MEDICINE

## 2021-05-05 PROCEDURE — 3078F DIAST BP <80 MM HG: CPT | Performed by: INTERNAL MEDICINE

## 2021-05-05 RX ORDER — TOLTERODINE 2 MG/1
2 CAPSULE, EXTENDED RELEASE ORAL
Qty: 90 CAPSULE | Refills: 3 | Status: SHIPPED | OUTPATIENT
Start: 2021-05-05

## 2021-05-05 RX ORDER — CHOLECALCIFEROL (VITAMIN D3) 125 MCG
CAPSULE ORAL
COMMUNITY

## 2021-05-06 NOTE — PROGRESS NOTES
HPI/Subjective:   Patient ID: Eilseo Reyna is a 72year old female.     HPI  Here for aha, see awv form filled out for details, dm, htn, hperchol, ho breast dx  /56   Pulse 64     /56   Pulse 64     History/Other:   Review of Systems   Consti Normocephalic and atraumatic. Right Ear: Tympanic membrane normal.      Left Ear: Tympanic membrane normal.      Nose:      Comments: Hearing intact bilaterally  Eyes:      Pupils: Pupils are equal, round, and reactive to light.    Cardiovascular:

## 2021-05-07 ENCOUNTER — OFFICE VISIT (OUTPATIENT)
Dept: SURGERY | Facility: CLINIC | Age: 71
End: 2021-05-07
Payer: COMMERCIAL

## 2021-05-07 VITALS
RESPIRATION RATE: 16 BRPM | HEART RATE: 71 BPM | WEIGHT: 179 LBS | DIASTOLIC BLOOD PRESSURE: 71 MMHG | BODY MASS INDEX: 29 KG/M2 | SYSTOLIC BLOOD PRESSURE: 117 MMHG

## 2021-05-07 DIAGNOSIS — Z12.39 BREAST CANCER SCREENING, HIGH RISK PATIENT: ICD-10-CM

## 2021-05-07 DIAGNOSIS — N60.81 FLAT EPITHELIAL ATYPIA (FEA) OF RIGHT BREAST: Primary | ICD-10-CM

## 2021-05-07 DIAGNOSIS — N60.91 ATYPICAL DUCTAL HYPERPLASIA OF RIGHT BREAST: ICD-10-CM

## 2021-05-07 PROCEDURE — 99214 OFFICE O/P EST MOD 30 MIN: CPT | Performed by: SURGERY

## 2021-05-07 PROCEDURE — 3074F SYST BP LT 130 MM HG: CPT | Performed by: SURGERY

## 2021-05-07 PROCEDURE — 3078F DIAST BP <80 MM HG: CPT | Performed by: SURGERY

## 2021-10-19 ENCOUNTER — TELEPHONE (OUTPATIENT)
Dept: INTERNAL MEDICINE CLINIC | Facility: CLINIC | Age: 71
End: 2021-10-19

## 2021-10-21 ENCOUNTER — NURSE ONLY (OUTPATIENT)
Dept: LAB | Facility: HOSPITAL | Age: 71
End: 2021-10-21
Attending: NURSE PRACTITIONER
Payer: MEDICARE

## 2021-10-21 ENCOUNTER — TELEMEDICINE (OUTPATIENT)
Dept: INTERNAL MEDICINE CLINIC | Facility: CLINIC | Age: 71
End: 2021-10-21
Payer: COMMERCIAL

## 2021-10-21 DIAGNOSIS — J02.9 SORE THROAT: ICD-10-CM

## 2021-10-21 DIAGNOSIS — J02.9 SORE THROAT: Primary | ICD-10-CM

## 2021-10-21 PROCEDURE — 99213 OFFICE O/P EST LOW 20 MIN: CPT | Performed by: NURSE PRACTITIONER

## 2021-10-21 NOTE — PROGRESS NOTES
Due to COVID-19 ACTION PLAN, the patient's office visit was converted to a video visit. This visit is conducted using video and audio. The patient consents to this service.   The patient understands and accepts financial responsibility for any deductible, 1 tablet (20 mg total) by mouth nightly. 90 tablet 3   • B Complex Vitamins (VITAMIN B COMPLEX OR) Take by mouth daily. • aspirin (ECOTRIN LOW STRENGTH) 81 MG Oral Tab EC Take 81 mg by mouth daily.      • Multiple Vitamins-Minerals (CENTRUM) Oral Tab This billing visit was spent on reviewing labs, medications, radiology tests and decision making. Appropriate medical decision-making and tests are ordered as detailed in the plan of care below.     Bartolo He understands phone evaluation is not a espinal

## 2022-02-04 ENCOUNTER — TELEPHONE (OUTPATIENT)
Dept: INTERNAL MEDICINE CLINIC | Facility: CLINIC | Age: 72
End: 2022-02-04

## 2022-02-04 NOTE — TELEPHONE ENCOUNTER
Supervisit   Future Appointments   Date Time Provider Dahlia Hutchins   5/2/2022  7:20 AM JAYCOB Wills [facility]      Orders to  Danielle Tovar     aware must fast no call back required

## 2022-03-29 RX ORDER — TOLTERODINE 2 MG/1
CAPSULE, EXTENDED RELEASE ORAL
Qty: 90 CAPSULE | Refills: 0 | Status: SHIPPED | OUTPATIENT
Start: 2022-03-29

## 2022-04-26 ENCOUNTER — LAB ENCOUNTER (OUTPATIENT)
Dept: LAB | Age: 72
End: 2022-04-26
Attending: NURSE PRACTITIONER
Payer: MEDICARE

## 2022-04-26 DIAGNOSIS — Z00.00 ROUTINE GENERAL MEDICAL EXAMINATION AT A HEALTH CARE FACILITY: ICD-10-CM

## 2022-04-26 DIAGNOSIS — E11.9 TYPE 2 DIABETES MELLITUS WITHOUT COMPLICATION, WITHOUT LONG-TERM CURRENT USE OF INSULIN (HCC): ICD-10-CM

## 2022-04-26 DIAGNOSIS — E78.00 PURE HYPERCHOLESTEROLEMIA: ICD-10-CM

## 2022-04-26 LAB
ALBUMIN SERPL-MCNC: 3.8 G/DL (ref 3.4–5)
ALBUMIN/GLOB SERPL: 1.2 {RATIO} (ref 1–2)
ALP LIVER SERPL-CCNC: 79 U/L
ALT SERPL-CCNC: 33 U/L
ANION GAP SERPL CALC-SCNC: 8 MMOL/L (ref 0–18)
AST SERPL-CCNC: 28 U/L (ref 15–37)
BASOPHILS # BLD AUTO: 0.08 X10(3) UL (ref 0–0.2)
BASOPHILS NFR BLD AUTO: 1.4 %
BILIRUB SERPL-MCNC: 0.4 MG/DL (ref 0.1–2)
BUN BLD-MCNC: 17 MG/DL (ref 7–18)
CALCIUM BLD-MCNC: 9.6 MG/DL (ref 8.5–10.1)
CHLORIDE SERPL-SCNC: 106 MMOL/L (ref 98–112)
CHOLEST SERPL-MCNC: 187 MG/DL (ref ?–200)
CO2 SERPL-SCNC: 28 MMOL/L (ref 21–32)
CREAT BLD-MCNC: 0.71 MG/DL
EOSINOPHIL # BLD AUTO: 0.26 X10(3) UL (ref 0–0.7)
EOSINOPHIL NFR BLD AUTO: 4.4 %
ERYTHROCYTE [DISTWIDTH] IN BLOOD BY AUTOMATED COUNT: 13.4 %
EST. AVERAGE GLUCOSE BLD GHB EST-MCNC: 157 MG/DL (ref 68–126)
FASTING PATIENT LIPID ANSWER: YES
FASTING STATUS PATIENT QL REPORTED: YES
GLOBULIN PLAS-MCNC: 3.3 G/DL (ref 2.8–4.4)
GLUCOSE BLD-MCNC: 119 MG/DL (ref 70–99)
HBA1C MFR BLD: 7.1 % (ref ?–5.7)
HCT VFR BLD AUTO: 45.4 %
HDLC SERPL-MCNC: 86 MG/DL (ref 40–59)
HGB BLD-MCNC: 14 G/DL
IMM GRANULOCYTES # BLD AUTO: 0.02 X10(3) UL (ref 0–1)
IMM GRANULOCYTES NFR BLD: 0.3 %
LDLC SERPL CALC-MCNC: 81 MG/DL (ref ?–100)
LYMPHOCYTES # BLD AUTO: 2.13 X10(3) UL (ref 1–4)
LYMPHOCYTES NFR BLD AUTO: 36.4 %
MCH RBC QN AUTO: 27.6 PG (ref 26–34)
MCHC RBC AUTO-ENTMCNC: 30.8 G/DL (ref 31–37)
MCV RBC AUTO: 89.4 FL
MONOCYTES # BLD AUTO: 0.52 X10(3) UL (ref 0.1–1)
MONOCYTES NFR BLD AUTO: 8.9 %
NEUTROPHILS # BLD AUTO: 2.84 X10 (3) UL (ref 1.5–7.7)
NEUTROPHILS # BLD AUTO: 2.84 X10(3) UL (ref 1.5–7.7)
NEUTROPHILS NFR BLD AUTO: 48.6 %
NONHDLC SERPL-MCNC: 101 MG/DL (ref ?–130)
OSMOLALITY SERPL CALC.SUM OF ELEC: 297 MOSM/KG (ref 275–295)
PLATELET # BLD AUTO: 205 10(3)UL (ref 150–450)
POTASSIUM SERPL-SCNC: 4.5 MMOL/L (ref 3.5–5.1)
PROT SERPL-MCNC: 7.1 G/DL (ref 6.4–8.2)
RBC # BLD AUTO: 5.08 X10(6)UL
SODIUM SERPL-SCNC: 142 MMOL/L (ref 136–145)
TRIGL SERPL-MCNC: 115 MG/DL (ref 30–149)
TSI SER-ACNC: 2.61 MIU/ML (ref 0.36–3.74)
VLDLC SERPL CALC-MCNC: 18 MG/DL (ref 0–30)
WBC # BLD AUTO: 5.9 X10(3) UL (ref 4–11)

## 2022-04-26 PROCEDURE — 83036 HEMOGLOBIN GLYCOSYLATED A1C: CPT

## 2022-04-26 PROCEDURE — 80053 COMPREHEN METABOLIC PANEL: CPT

## 2022-04-26 PROCEDURE — 36415 COLL VENOUS BLD VENIPUNCTURE: CPT

## 2022-04-26 PROCEDURE — 80061 LIPID PANEL: CPT

## 2022-04-26 PROCEDURE — 84443 ASSAY THYROID STIM HORMONE: CPT

## 2022-04-26 PROCEDURE — 85025 COMPLETE CBC W/AUTO DIFF WBC: CPT

## 2022-04-26 PROCEDURE — 3051F HG A1C>EQUAL 7.0%<8.0%: CPT | Performed by: NURSE PRACTITIONER

## 2022-05-02 ENCOUNTER — OFFICE VISIT (OUTPATIENT)
Dept: INTERNAL MEDICINE CLINIC | Facility: CLINIC | Age: 72
End: 2022-05-02
Payer: COMMERCIAL

## 2022-05-02 VITALS
TEMPERATURE: 97 F | DIASTOLIC BLOOD PRESSURE: 60 MMHG | WEIGHT: 174 LBS | HEIGHT: 65 IN | SYSTOLIC BLOOD PRESSURE: 114 MMHG | BODY MASS INDEX: 28.99 KG/M2 | OXYGEN SATURATION: 98 % | HEART RATE: 68 BPM

## 2022-05-02 DIAGNOSIS — E78.00 PURE HYPERCHOLESTEROLEMIA: ICD-10-CM

## 2022-05-02 DIAGNOSIS — E04.1 NODULAR THYROID DISEASE: ICD-10-CM

## 2022-05-02 DIAGNOSIS — M85.859 OSTEOPENIA OF NECK OF FEMUR, UNSPECIFIED LATERALITY: ICD-10-CM

## 2022-05-02 DIAGNOSIS — H35.372 EPIRETINAL MEMBRANE (ERM) OF LEFT EYE: ICD-10-CM

## 2022-05-02 DIAGNOSIS — Z80.41 FAMILY HISTORY OF MALIGNANT NEOPLASM OF OVARY: ICD-10-CM

## 2022-05-02 DIAGNOSIS — N60.81 FLAT EPITHELIAL ATYPIA (FEA) OF RIGHT BREAST: ICD-10-CM

## 2022-05-02 DIAGNOSIS — E55.9 VITAMIN D DEFICIENCY: ICD-10-CM

## 2022-05-02 DIAGNOSIS — F41.9 ANXIETY: ICD-10-CM

## 2022-05-02 DIAGNOSIS — N32.81 OAB (OVERACTIVE BLADDER): ICD-10-CM

## 2022-05-02 DIAGNOSIS — Z00.00 ENCOUNTER FOR ANNUAL HEALTH EXAMINATION: Primary | ICD-10-CM

## 2022-05-02 DIAGNOSIS — E11.9 TYPE 2 DIABETES MELLITUS WITHOUT COMPLICATION, WITHOUT LONG-TERM CURRENT USE OF INSULIN (HCC): ICD-10-CM

## 2022-05-02 DIAGNOSIS — L57.0 ACTINIC KERATOSIS: ICD-10-CM

## 2022-05-02 DIAGNOSIS — R92.0 MICROCALCIFICATION OF RIGHT BREAST ON MAMMOGRAM: ICD-10-CM

## 2022-05-02 LAB
CREAT UR-SCNC: 54.4 MG/DL
MICROALBUMIN UR-MCNC: 0.56 MG/DL
MICROALBUMIN/CREAT 24H UR-RTO: 10.3 UG/MG (ref ?–30)

## 2022-05-02 PROCEDURE — 3074F SYST BP LT 130 MM HG: CPT | Performed by: NURSE PRACTITIONER

## 2022-05-02 PROCEDURE — G0439 PPPS, SUBSEQ VISIT: HCPCS | Performed by: NURSE PRACTITIONER

## 2022-05-02 PROCEDURE — 82043 UR ALBUMIN QUANTITATIVE: CPT | Performed by: NURSE PRACTITIONER

## 2022-05-02 PROCEDURE — 3008F BODY MASS INDEX DOCD: CPT | Performed by: NURSE PRACTITIONER

## 2022-05-02 PROCEDURE — 99397 PER PM REEVAL EST PAT 65+ YR: CPT | Performed by: NURSE PRACTITIONER

## 2022-05-02 PROCEDURE — 3061F NEG MICROALBUMINURIA REV: CPT | Performed by: NURSE PRACTITIONER

## 2022-05-02 PROCEDURE — 96160 PT-FOCUSED HLTH RISK ASSMT: CPT | Performed by: NURSE PRACTITIONER

## 2022-05-02 PROCEDURE — 82570 ASSAY OF URINE CREATININE: CPT | Performed by: NURSE PRACTITIONER

## 2022-05-02 PROCEDURE — 3078F DIAST BP <80 MM HG: CPT | Performed by: NURSE PRACTITIONER

## 2022-05-04 ENCOUNTER — HOSPITAL ENCOUNTER (OUTPATIENT)
Dept: MAMMOGRAPHY | Facility: HOSPITAL | Age: 72
Discharge: HOME | End: 2022-05-04
Attending: SURGERY
Payer: MEDICARE

## 2022-05-04 DIAGNOSIS — Z12.39 BREAST CANCER SCREENING, HIGH RISK PATIENT: ICD-10-CM

## 2022-05-04 PROCEDURE — 77067 SCR MAMMO BI INCL CAD: CPT | Performed by: SURGERY

## 2022-05-04 PROCEDURE — 77063 BREAST TOMOSYNTHESIS BI: CPT | Performed by: SURGERY

## 2022-05-10 ENCOUNTER — OFFICE VISIT (OUTPATIENT)
Dept: SURGERY | Facility: CLINIC | Age: 72
End: 2022-05-10
Payer: COMMERCIAL

## 2022-05-10 VITALS
HEART RATE: 79 BPM | SYSTOLIC BLOOD PRESSURE: 120 MMHG | RESPIRATION RATE: 18 BRPM | BODY MASS INDEX: 28 KG/M2 | DIASTOLIC BLOOD PRESSURE: 75 MMHG | OXYGEN SATURATION: 97 % | WEIGHT: 169.19 LBS | TEMPERATURE: 98 F

## 2022-05-10 DIAGNOSIS — N60.91 ATYPICAL DUCTAL HYPERPLASIA OF RIGHT BREAST: Primary | ICD-10-CM

## 2022-05-10 DIAGNOSIS — Z12.31 ENCOUNTER FOR SCREENING MAMMOGRAM FOR HIGH-RISK PATIENT: ICD-10-CM

## 2022-05-10 PROCEDURE — 3074F SYST BP LT 130 MM HG: CPT | Performed by: SURGERY

## 2022-05-10 PROCEDURE — 99213 OFFICE O/P EST LOW 20 MIN: CPT | Performed by: SURGERY

## 2022-05-10 PROCEDURE — 3078F DIAST BP <80 MM HG: CPT | Performed by: SURGERY

## 2022-07-12 ENCOUNTER — HOSPITAL ENCOUNTER (OUTPATIENT)
Age: 72
Discharge: HOME | End: 2022-07-12
Payer: MEDICARE

## 2022-07-12 VITALS
HEART RATE: 81 BPM | HEIGHT: 66 IN | BODY MASS INDEX: 27 KG/M2 | TEMPERATURE: 97 F | SYSTOLIC BLOOD PRESSURE: 135 MMHG | WEIGHT: 168 LBS | RESPIRATION RATE: 18 BRPM | DIASTOLIC BLOOD PRESSURE: 82 MMHG | OXYGEN SATURATION: 97 %

## 2022-07-12 DIAGNOSIS — Z20.822 CLOSE EXPOSURE TO COVID-19 VIRUS: Primary | ICD-10-CM

## 2022-07-12 DIAGNOSIS — Z11.52 ENCOUNTER FOR SCREENING FOR COVID-19: ICD-10-CM

## 2022-07-12 LAB — SARS-COV-2 RNA RESP QL NAA+PROBE: NOT DETECTED

## 2022-07-12 PROCEDURE — U0002 COVID-19 LAB TEST NON-CDC: HCPCS | Performed by: NURSE PRACTITIONER

## 2022-07-12 PROCEDURE — 99213 OFFICE O/P EST LOW 20 MIN: CPT | Performed by: NURSE PRACTITIONER

## 2022-07-12 NOTE — ED INITIAL ASSESSMENT (HPI)
Patient states here for CV-19 testing  Patient states exposure to CV-19 positive individual  Denies any symptoms of CV-19 or fever

## 2022-08-31 RX ORDER — TOLTERODINE 2 MG/1
CAPSULE, EXTENDED RELEASE ORAL
Qty: 90 CAPSULE | Refills: 0 | Status: SHIPPED | OUTPATIENT
Start: 2022-08-31

## 2022-09-14 ENCOUNTER — LAB ENCOUNTER (OUTPATIENT)
Dept: LAB | Age: 72
End: 2022-09-14
Attending: NURSE PRACTITIONER
Payer: MEDICARE

## 2022-09-14 ENCOUNTER — OFFICE VISIT (OUTPATIENT)
Dept: INTERNAL MEDICINE CLINIC | Facility: CLINIC | Age: 72
End: 2022-09-14
Payer: COMMERCIAL

## 2022-09-14 VITALS
HEIGHT: 66 IN | BODY MASS INDEX: 28.28 KG/M2 | TEMPERATURE: 98 F | SYSTOLIC BLOOD PRESSURE: 100 MMHG | WEIGHT: 176 LBS | HEART RATE: 90 BPM | OXYGEN SATURATION: 97 % | DIASTOLIC BLOOD PRESSURE: 60 MMHG

## 2022-09-14 DIAGNOSIS — E55.9 VITAMIN D DEFICIENCY: ICD-10-CM

## 2022-09-14 DIAGNOSIS — R53.83 FATIGUE, UNSPECIFIED TYPE: Primary | ICD-10-CM

## 2022-09-14 DIAGNOSIS — E78.00 PURE HYPERCHOLESTEROLEMIA: ICD-10-CM

## 2022-09-14 DIAGNOSIS — R53.83 FATIGUE, UNSPECIFIED TYPE: ICD-10-CM

## 2022-09-14 DIAGNOSIS — E11.9 TYPE 2 DIABETES MELLITUS WITHOUT COMPLICATION, WITHOUT LONG-TERM CURRENT USE OF INSULIN (HCC): ICD-10-CM

## 2022-09-14 DIAGNOSIS — F41.9 ANXIETY: ICD-10-CM

## 2022-09-14 LAB
ALBUMIN SERPL-MCNC: 4 G/DL (ref 3.4–5)
ALBUMIN/GLOB SERPL: 1.1 {RATIO} (ref 1–2)
ALP LIVER SERPL-CCNC: 77 U/L
ALT SERPL-CCNC: 31 U/L
ANION GAP SERPL CALC-SCNC: 6 MMOL/L (ref 0–18)
AST SERPL-CCNC: 24 U/L (ref 15–37)
BASOPHILS # BLD AUTO: 0.07 X10(3) UL (ref 0–0.2)
BASOPHILS NFR BLD AUTO: 0.9 %
BILIRUB SERPL-MCNC: 0.4 MG/DL (ref 0.1–2)
BUN BLD-MCNC: 17 MG/DL (ref 7–18)
BUN/CREAT SERPL: 21 (ref 10–20)
CALCIUM BLD-MCNC: 9.8 MG/DL (ref 8.5–10.1)
CHLORIDE SERPL-SCNC: 104 MMOL/L (ref 98–112)
CO2 SERPL-SCNC: 27 MMOL/L (ref 21–32)
CREAT BLD-MCNC: 0.81 MG/DL
DEPRECATED RDW RBC AUTO: 43.5 FL (ref 35.1–46.3)
EOSINOPHIL # BLD AUTO: 0.24 X10(3) UL (ref 0–0.7)
EOSINOPHIL NFR BLD AUTO: 3 %
ERYTHROCYTE [DISTWIDTH] IN BLOOD BY AUTOMATED COUNT: 13.4 % (ref 11–15)
FASTING STATUS PATIENT QL REPORTED: NO
GFR SERPLBLD BASED ON 1.73 SQ M-ARVRAT: 78 ML/MIN/1.73M2 (ref 60–?)
GLOBULIN PLAS-MCNC: 3.6 G/DL (ref 2.8–4.4)
GLUCOSE BLD-MCNC: 102 MG/DL (ref 70–99)
HCT VFR BLD AUTO: 43.8 %
HGB BLD-MCNC: 13.8 G/DL
IMM GRANULOCYTES # BLD AUTO: 0.02 X10(3) UL (ref 0–1)
IMM GRANULOCYTES NFR BLD: 0.2 %
LYMPHOCYTES # BLD AUTO: 2.58 X10(3) UL (ref 1–4)
LYMPHOCYTES NFR BLD AUTO: 32.2 %
MCH RBC QN AUTO: 27.9 PG (ref 26–34)
MCHC RBC AUTO-ENTMCNC: 31.5 G/DL (ref 31–37)
MCV RBC AUTO: 88.5 FL
MONOCYTES # BLD AUTO: 0.57 X10(3) UL (ref 0.1–1)
MONOCYTES NFR BLD AUTO: 7.1 %
NEUTROPHILS # BLD AUTO: 4.53 X10 (3) UL (ref 1.5–7.7)
NEUTROPHILS # BLD AUTO: 4.53 X10(3) UL (ref 1.5–7.7)
NEUTROPHILS NFR BLD AUTO: 56.6 %
OSMOLALITY SERPL CALC.SUM OF ELEC: 286 MOSM/KG (ref 275–295)
PLATELET # BLD AUTO: 251 10(3)UL (ref 150–450)
POTASSIUM SERPL-SCNC: 4.4 MMOL/L (ref 3.5–5.1)
PROT SERPL-MCNC: 7.6 G/DL (ref 6.4–8.2)
RBC # BLD AUTO: 4.95 X10(6)UL
SODIUM SERPL-SCNC: 137 MMOL/L (ref 136–145)
TSI SER-ACNC: 1.89 MIU/ML (ref 0.36–3.74)
VIT B12 SERPL-MCNC: 566 PG/ML (ref 193–986)
VIT D+METAB SERPL-MCNC: 42.5 NG/ML (ref 30–100)
WBC # BLD AUTO: 8 X10(3) UL (ref 4–11)

## 2022-09-14 PROCEDURE — 84443 ASSAY THYROID STIM HORMONE: CPT

## 2022-09-14 PROCEDURE — 99214 OFFICE O/P EST MOD 30 MIN: CPT | Performed by: NURSE PRACTITIONER

## 2022-09-14 PROCEDURE — 85025 COMPLETE CBC W/AUTO DIFF WBC: CPT

## 2022-09-14 PROCEDURE — 82607 VITAMIN B-12: CPT

## 2022-09-14 PROCEDURE — 82306 VITAMIN D 25 HYDROXY: CPT

## 2022-09-14 PROCEDURE — 36415 COLL VENOUS BLD VENIPUNCTURE: CPT

## 2022-09-14 PROCEDURE — 3074F SYST BP LT 130 MM HG: CPT | Performed by: NURSE PRACTITIONER

## 2022-09-14 PROCEDURE — 80053 COMPREHEN METABOLIC PANEL: CPT

## 2022-09-14 PROCEDURE — 3008F BODY MASS INDEX DOCD: CPT | Performed by: NURSE PRACTITIONER

## 2022-09-14 PROCEDURE — 3078F DIAST BP <80 MM HG: CPT | Performed by: NURSE PRACTITIONER

## 2022-09-14 PROCEDURE — 93000 ELECTROCARDIOGRAM COMPLETE: CPT | Performed by: NURSE PRACTITIONER

## 2022-09-14 RX ORDER — MULTIVIT WITH MINERALS/LUTEIN
1000 TABLET ORAL DAILY
COMMUNITY

## 2022-09-14 NOTE — PROGRESS NOTES
[facility] PT - Supervisit Completed 5/2/2022  [facility] - Completed 5/2/2022  LDL - UTD Until 4/26/2023  MAMMOGRAM - UTD Until 5/4/2022  COLONOSCOPY - UTD Until 12/8/2024

## 2022-10-06 ENCOUNTER — TELEPHONE (OUTPATIENT)
Dept: INTERNAL MEDICINE CLINIC | Facility: CLINIC | Age: 72
End: 2022-10-06

## 2022-10-06 NOTE — TELEPHONE ENCOUNTER
Diabetic eye exam report received from [facility] ; DM flowsheet updated and report sent to scanning. Confirmation obtained.

## 2022-11-02 RX ORDER — TOLTERODINE 2 MG/1
CAPSULE, EXTENDED RELEASE ORAL
Qty: 90 CAPSULE | Refills: 3 | Status: SHIPPED | OUTPATIENT
Start: 2022-11-02

## 2023-02-06 ENCOUNTER — TELEPHONE (OUTPATIENT)
Dept: INTERNAL MEDICINE CLINIC | Facility: CLINIC | Age: 73
End: 2023-02-06

## 2023-02-06 DIAGNOSIS — E11.9 TYPE 2 DIABETES MELLITUS WITHOUT COMPLICATION, WITHOUT LONG-TERM CURRENT USE OF INSULIN (HCC): ICD-10-CM

## 2023-02-06 DIAGNOSIS — E78.00 PURE HYPERCHOLESTEROLEMIA: ICD-10-CM

## 2023-02-06 DIAGNOSIS — Z00.00 ROUTINE GENERAL MEDICAL EXAMINATION AT A HEALTH CARE FACILITY: Primary | ICD-10-CM

## 2023-02-06 NOTE — TELEPHONE ENCOUNTER
Future Appointments   Date Time Provider Dahlia Liset   4/11/2023  7:20 AM JAYCOB Hurtado [facility]     Informed must fast no call back required.  Orders to [employer]

## 2023-04-07 ENCOUNTER — LAB ENCOUNTER (OUTPATIENT)
Dept: LAB | Age: 73
End: 2023-04-07
Attending: NURSE PRACTITIONER
Payer: MEDICARE

## 2023-04-07 DIAGNOSIS — Z00.00 ROUTINE GENERAL MEDICAL EXAMINATION AT A HEALTH CARE FACILITY: ICD-10-CM

## 2023-04-07 DIAGNOSIS — E78.00 PURE HYPERCHOLESTEROLEMIA: ICD-10-CM

## 2023-04-07 DIAGNOSIS — E11.9 TYPE 2 DIABETES MELLITUS WITHOUT COMPLICATION, WITHOUT LONG-TERM CURRENT USE OF INSULIN (HCC): ICD-10-CM

## 2023-04-07 LAB
ALBUMIN SERPL-MCNC: 3.9 G/DL (ref 3.4–5)
ALBUMIN/GLOB SERPL: 1.3 {RATIO} (ref 1–2)
ALP LIVER SERPL-CCNC: 70 U/L
ALT SERPL-CCNC: 35 U/L
ANION GAP SERPL CALC-SCNC: 5 MMOL/L (ref 0–18)
AST SERPL-CCNC: 23 U/L (ref 15–37)
BASOPHILS # BLD AUTO: 0.07 X10(3) UL (ref 0–0.2)
BASOPHILS NFR BLD AUTO: 1.2 %
BILIRUB SERPL-MCNC: 0.5 MG/DL (ref 0.1–2)
BUN BLD-MCNC: 17 MG/DL (ref 7–18)
CALCIUM BLD-MCNC: 9.6 MG/DL (ref 8.5–10.1)
CHLORIDE SERPL-SCNC: 106 MMOL/L (ref 98–112)
CHOLEST SERPL-MCNC: 169 MG/DL (ref ?–200)
CO2 SERPL-SCNC: 28 MMOL/L (ref 21–32)
CREAT BLD-MCNC: 0.72 MG/DL
CREAT UR-SCNC: 69.7 MG/DL
EOSINOPHIL # BLD AUTO: 0.25 X10(3) UL (ref 0–0.7)
EOSINOPHIL NFR BLD AUTO: 4.5 %
ERYTHROCYTE [DISTWIDTH] IN BLOOD BY AUTOMATED COUNT: 13.8 %
EST. AVERAGE GLUCOSE BLD GHB EST-MCNC: 160 MG/DL (ref 68–126)
FASTING PATIENT LIPID ANSWER: YES
FASTING STATUS PATIENT QL REPORTED: YES
GFR SERPLBLD BASED ON 1.73 SQ M-ARVRAT: 89 ML/MIN/1.73M2 (ref 60–?)
GLOBULIN PLAS-MCNC: 3.1 G/DL (ref 2.8–4.4)
GLUCOSE BLD-MCNC: 109 MG/DL (ref 70–99)
HBA1C MFR BLD: 7.2 % (ref ?–5.7)
HCT VFR BLD AUTO: 45.5 %
HDLC SERPL-MCNC: 90 MG/DL (ref 40–59)
HGB BLD-MCNC: 14.2 G/DL
IMM GRANULOCYTES # BLD AUTO: 0.01 X10(3) UL (ref 0–1)
IMM GRANULOCYTES NFR BLD: 0.2 %
LDLC SERPL CALC-MCNC: 63 MG/DL (ref ?–100)
LYMPHOCYTES # BLD AUTO: 2.17 X10(3) UL (ref 1–4)
LYMPHOCYTES NFR BLD AUTO: 38.7 %
MCH RBC QN AUTO: 28.2 PG (ref 26–34)
MCHC RBC AUTO-ENTMCNC: 31.2 G/DL (ref 31–37)
MCV RBC AUTO: 90.3 FL
MICROALBUMIN UR-MCNC: 1.24 MG/DL
MICROALBUMIN/CREAT 24H UR-RTO: 17.8 UG/MG (ref ?–30)
MONOCYTES # BLD AUTO: 0.43 X10(3) UL (ref 0.1–1)
MONOCYTES NFR BLD AUTO: 7.7 %
NEUTROPHILS # BLD AUTO: 2.68 X10 (3) UL (ref 1.5–7.7)
NEUTROPHILS # BLD AUTO: 2.68 X10(3) UL (ref 1.5–7.7)
NEUTROPHILS NFR BLD AUTO: 47.7 %
NONHDLC SERPL-MCNC: 79 MG/DL (ref ?–130)
OSMOLALITY SERPL CALC.SUM OF ELEC: 290 MOSM/KG (ref 275–295)
PLATELET # BLD AUTO: 207 10(3)UL (ref 150–450)
POTASSIUM SERPL-SCNC: 4.4 MMOL/L (ref 3.5–5.1)
PROT SERPL-MCNC: 7 G/DL (ref 6.4–8.2)
RBC # BLD AUTO: 5.04 X10(6)UL
SODIUM SERPL-SCNC: 139 MMOL/L (ref 136–145)
T4 FREE SERPL-MCNC: 0.8 NG/DL (ref 0.8–1.7)
TRIGL SERPL-MCNC: 90 MG/DL (ref 30–149)
TSI SER-ACNC: 4.69 MIU/ML (ref 0.36–3.74)
VLDLC SERPL CALC-MCNC: 13 MG/DL (ref 0–30)
WBC # BLD AUTO: 5.6 X10(3) UL (ref 4–11)

## 2023-04-07 PROCEDURE — 82570 ASSAY OF URINE CREATININE: CPT

## 2023-04-07 PROCEDURE — 3051F HG A1C>EQUAL 7.0%<8.0%: CPT | Performed by: NURSE PRACTITIONER

## 2023-04-07 PROCEDURE — 3061F NEG MICROALBUMINURIA REV: CPT | Performed by: NURSE PRACTITIONER

## 2023-04-07 PROCEDURE — 82043 UR ALBUMIN QUANTITATIVE: CPT

## 2023-04-07 PROCEDURE — 85025 COMPLETE CBC W/AUTO DIFF WBC: CPT

## 2023-04-07 PROCEDURE — 80061 LIPID PANEL: CPT

## 2023-04-07 PROCEDURE — 84439 ASSAY OF FREE THYROXINE: CPT

## 2023-04-07 PROCEDURE — 84443 ASSAY THYROID STIM HORMONE: CPT

## 2023-04-07 PROCEDURE — 83036 HEMOGLOBIN GLYCOSYLATED A1C: CPT

## 2023-04-07 PROCEDURE — 80053 COMPREHEN METABOLIC PANEL: CPT

## 2023-04-07 PROCEDURE — 36415 COLL VENOUS BLD VENIPUNCTURE: CPT

## 2023-04-13 ENCOUNTER — OFFICE VISIT (OUTPATIENT)
Dept: INTERNAL MEDICINE CLINIC | Facility: CLINIC | Age: 73
End: 2023-04-13
Payer: MEDICARE

## 2023-04-13 VITALS
WEIGHT: 177 LBS | RESPIRATION RATE: 14 BRPM | HEART RATE: 66 BPM | DIASTOLIC BLOOD PRESSURE: 64 MMHG | BODY MASS INDEX: 29.49 KG/M2 | TEMPERATURE: 97 F | HEIGHT: 65 IN | SYSTOLIC BLOOD PRESSURE: 128 MMHG

## 2023-04-13 DIAGNOSIS — E55.9 VITAMIN D DEFICIENCY: ICD-10-CM

## 2023-04-13 DIAGNOSIS — N32.81 OAB (OVERACTIVE BLADDER): ICD-10-CM

## 2023-04-13 DIAGNOSIS — H35.372 EPIRETINAL MEMBRANE (ERM) OF LEFT EYE: ICD-10-CM

## 2023-04-13 DIAGNOSIS — F41.9 ANXIETY: ICD-10-CM

## 2023-04-13 DIAGNOSIS — E11.9 TYPE 2 DIABETES MELLITUS WITHOUT COMPLICATION, WITHOUT LONG-TERM CURRENT USE OF INSULIN (HCC): ICD-10-CM

## 2023-04-13 DIAGNOSIS — E78.00 PURE HYPERCHOLESTEROLEMIA: ICD-10-CM

## 2023-04-13 DIAGNOSIS — E04.1 NODULAR THYROID DISEASE: ICD-10-CM

## 2023-04-13 DIAGNOSIS — L57.0 ACTINIC KERATOSIS: ICD-10-CM

## 2023-04-13 DIAGNOSIS — R92.0 MICROCALCIFICATION OF RIGHT BREAST ON MAMMOGRAM: ICD-10-CM

## 2023-04-13 DIAGNOSIS — Z00.00 ENCOUNTER FOR ANNUAL HEALTH EXAMINATION: Primary | ICD-10-CM

## 2023-05-10 ENCOUNTER — HOSPITAL ENCOUNTER (OUTPATIENT)
Dept: MAMMOGRAPHY | Facility: HOSPITAL | Age: 73
Discharge: HOME | End: 2023-05-10
Attending: SURGERY
Payer: MEDICARE

## 2023-05-10 DIAGNOSIS — Z12.31 ENCOUNTER FOR SCREENING MAMMOGRAM FOR HIGH-RISK PATIENT: ICD-10-CM

## 2023-05-10 DIAGNOSIS — R92.1 BREAST CALCIFICATION, RIGHT: Primary | ICD-10-CM

## 2023-05-10 PROCEDURE — 77067 SCR MAMMO BI INCL CAD: CPT | Performed by: SURGERY

## 2023-05-10 PROCEDURE — 77063 BREAST TOMOSYNTHESIS BI: CPT | Performed by: SURGERY

## 2023-05-18 ENCOUNTER — HOSPITAL ENCOUNTER (OUTPATIENT)
Dept: MAMMOGRAPHY | Facility: HOSPITAL | Age: 73
Discharge: HOME | End: 2023-05-18
Attending: SURGERY
Payer: MEDICARE

## 2023-05-18 ENCOUNTER — TELEPHONE (OUTPATIENT)
Dept: INTERNAL MEDICINE CLINIC | Facility: CLINIC | Age: 73
End: 2023-05-18

## 2023-05-18 DIAGNOSIS — R92.1 BREAST CALCIFICATION, RIGHT: ICD-10-CM

## 2023-05-18 DIAGNOSIS — R92.1 BREAST CALCIFICATION, RIGHT: Primary | ICD-10-CM

## 2023-05-18 PROCEDURE — 77061 BREAST TOMOSYNTHESIS UNI: CPT | Performed by: SURGERY

## 2023-05-18 PROCEDURE — 77065 DX MAMMO INCL CAD UNI: CPT | Performed by: SURGERY

## 2023-08-07 ENCOUNTER — LAB ENCOUNTER (OUTPATIENT)
Dept: LAB | Facility: HOSPITAL | Age: 73
End: 2023-08-07
Attending: NURSE PRACTITIONER
Payer: MEDICARE

## 2023-08-07 ENCOUNTER — TELEPHONE (OUTPATIENT)
Dept: INTERNAL MEDICINE CLINIC | Facility: CLINIC | Age: 73
End: 2023-08-07

## 2023-08-07 ENCOUNTER — OFFICE VISIT (OUTPATIENT)
Dept: INTERNAL MEDICINE CLINIC | Facility: CLINIC | Age: 73
End: 2023-08-07
Payer: MEDICARE

## 2023-08-07 VITALS
TEMPERATURE: 97 F | SYSTOLIC BLOOD PRESSURE: 108 MMHG | RESPIRATION RATE: 16 BRPM | HEIGHT: 64.5 IN | BODY MASS INDEX: 29.11 KG/M2 | DIASTOLIC BLOOD PRESSURE: 62 MMHG | WEIGHT: 172.63 LBS | HEART RATE: 72 BPM | OXYGEN SATURATION: 100 %

## 2023-08-07 DIAGNOSIS — R51.9 TEMPORAL PAIN: ICD-10-CM

## 2023-08-07 DIAGNOSIS — F41.9 ANXIETY: ICD-10-CM

## 2023-08-07 DIAGNOSIS — E11.9 TYPE 2 DIABETES MELLITUS WITHOUT COMPLICATION, WITHOUT LONG-TERM CURRENT USE OF INSULIN (HCC): ICD-10-CM

## 2023-08-07 DIAGNOSIS — R51.9 PRESSURE IN HEAD: Primary | ICD-10-CM

## 2023-08-07 DIAGNOSIS — R51.9 PRESSURE IN HEAD: ICD-10-CM

## 2023-08-07 LAB
ALBUMIN SERPL-MCNC: 3.7 G/DL (ref 3.4–5)
ALBUMIN/GLOB SERPL: 1 {RATIO} (ref 1–2)
ALP LIVER SERPL-CCNC: 72 U/L
ALT SERPL-CCNC: 32 U/L
ANION GAP SERPL CALC-SCNC: 4 MMOL/L (ref 0–18)
AST SERPL-CCNC: 24 U/L (ref 15–37)
BASOPHILS # BLD AUTO: 0.05 X10(3) UL (ref 0–0.2)
BASOPHILS NFR BLD AUTO: 0.8 %
BILIRUB SERPL-MCNC: 0.3 MG/DL (ref 0.1–2)
BUN BLD-MCNC: 14 MG/DL (ref 7–18)
CALCIUM BLD-MCNC: 9.3 MG/DL (ref 8.5–10.1)
CHLORIDE SERPL-SCNC: 106 MMOL/L (ref 98–112)
CO2 SERPL-SCNC: 28 MMOL/L (ref 21–32)
CREAT BLD-MCNC: 0.71 MG/DL
CRP SERPL-MCNC: <0.29 MG/DL (ref ?–0.3)
EGFRCR SERPLBLD CKD-EPI 2021: 91 ML/MIN/1.73M2 (ref 60–?)
EOSINOPHIL # BLD AUTO: 0.17 X10(3) UL (ref 0–0.7)
EOSINOPHIL NFR BLD AUTO: 2.9 %
ERYTHROCYTE [DISTWIDTH] IN BLOOD BY AUTOMATED COUNT: 13.5 %
ERYTHROCYTE [SEDIMENTATION RATE] IN BLOOD: 14 MM/HR
FASTING STATUS PATIENT QL REPORTED: YES
GLOBULIN PLAS-MCNC: 3.7 G/DL (ref 2.8–4.4)
GLUCOSE BLD-MCNC: 124 MG/DL (ref 70–99)
HCT VFR BLD AUTO: 43.4 %
HGB BLD-MCNC: 14 G/DL
IMM GRANULOCYTES # BLD AUTO: 0.02 X10(3) UL (ref 0–1)
IMM GRANULOCYTES NFR BLD: 0.3 %
LYMPHOCYTES # BLD AUTO: 2.09 X10(3) UL (ref 1–4)
LYMPHOCYTES NFR BLD AUTO: 35.1 %
MCH RBC QN AUTO: 28 PG (ref 26–34)
MCHC RBC AUTO-ENTMCNC: 32.3 G/DL (ref 31–37)
MCV RBC AUTO: 86.8 FL
MONOCYTES # BLD AUTO: 0.39 X10(3) UL (ref 0.1–1)
MONOCYTES NFR BLD AUTO: 6.6 %
NEUTROPHILS # BLD AUTO: 3.23 X10 (3) UL (ref 1.5–7.7)
NEUTROPHILS # BLD AUTO: 3.23 X10(3) UL (ref 1.5–7.7)
NEUTROPHILS NFR BLD AUTO: 54.3 %
OSMOLALITY SERPL CALC.SUM OF ELEC: 288 MOSM/KG (ref 275–295)
PLATELET # BLD AUTO: 222 10(3)UL (ref 150–450)
POTASSIUM SERPL-SCNC: 4.2 MMOL/L (ref 3.5–5.1)
PROT SERPL-MCNC: 7.4 G/DL (ref 6.4–8.2)
RBC # BLD AUTO: 5 X10(6)UL
SODIUM SERPL-SCNC: 138 MMOL/L (ref 136–145)
WBC # BLD AUTO: 6 X10(3) UL (ref 4–11)

## 2023-08-07 PROCEDURE — 1170F FXNL STATUS ASSESSED: CPT | Performed by: NURSE PRACTITIONER

## 2023-08-07 PROCEDURE — 85652 RBC SED RATE AUTOMATED: CPT

## 2023-08-07 PROCEDURE — 1160F RVW MEDS BY RX/DR IN RCRD: CPT | Performed by: NURSE PRACTITIONER

## 2023-08-07 PROCEDURE — 1159F MED LIST DOCD IN RCRD: CPT | Performed by: NURSE PRACTITIONER

## 2023-08-07 PROCEDURE — 36415 COLL VENOUS BLD VENIPUNCTURE: CPT

## 2023-08-07 PROCEDURE — 80053 COMPREHEN METABOLIC PANEL: CPT

## 2023-08-07 PROCEDURE — 99214 OFFICE O/P EST MOD 30 MIN: CPT | Performed by: NURSE PRACTITIONER

## 2023-08-07 PROCEDURE — 86140 C-REACTIVE PROTEIN: CPT

## 2023-08-07 PROCEDURE — 3078F DIAST BP <80 MM HG: CPT | Performed by: NURSE PRACTITIONER

## 2023-08-07 PROCEDURE — 3008F BODY MASS INDEX DOCD: CPT | Performed by: NURSE PRACTITIONER

## 2023-08-07 PROCEDURE — 85025 COMPLETE CBC W/AUTO DIFF WBC: CPT

## 2023-08-07 PROCEDURE — 3074F SYST BP LT 130 MM HG: CPT | Performed by: NURSE PRACTITIONER

## 2023-08-07 RX ORDER — PREDNISONE 20 MG/1
60 TABLET ORAL DAILY
Qty: 6 TABLET | Refills: 0 | Status: SHIPPED | OUTPATIENT
Start: 2023-08-07 | End: 2023-08-09

## 2023-10-05 RX ORDER — TOLTERODINE 2 MG/1
2 CAPSULE, EXTENDED RELEASE ORAL DAILY
Qty: 90 CAPSULE | Refills: 3 | Status: SHIPPED | OUTPATIENT
Start: 2023-10-05

## 2023-10-05 NOTE — TELEPHONE ENCOUNTER
Requested Prescriptions     Pending Prescriptions Disp Refills    TOLTERODINE ER 2 MG Oral Capsule SR 24 Hr [Pharmacy Med Name: TOLTERODINE  CAP 2MG ER] 90 capsule 3     Sig: TAKE 1 CAPSULE BY MOUTH    ONCE  DAILY       LOV: 8/7/23    LAST PHYSICAL: 4/13/23    LAST REFILL: tolterodine-90-11/2/22    LAST LAB:8/7/23    Your Appointments      Monday November 06, 2023  7:40 AM  DIAGNOSTIC MAMMOGRAM with [address]) [place] [address] [phone]   Please arrive 15 minutes prior to your appointment. If you are late to your appointment, you will be rescheduled. If breastfeeding, pump or breastfeed one hour prior to your appointment time. Continuous glucose monitors must be removed so the appointment should be scheduled near the normal replacement date. If you have had a mammogram within the last 5 years at a facility other than [address] [place] [address], you will need to bring those films to your appointment otherwise you will be rescheduled. Do NOT use deodorant, talcum powder, lotions, or creams on your breasts or underarms. They leave a coating that may be picked up by the x-rays, thereby distorting the mammogram.    Wear a two piece outfit the day of the exam. This allows you to be more comfortable during the exam.    There are no eating or activity restrictions for this exam.    The estimated duration of this exam could take up to 2 hours if an ultrasound is required. If you have questions regarding this exam, please contact a mammography technologist at [facility] at [phone] or [facility] at [phone]. Jose Daniel Rebolledo

## 2023-11-06 ENCOUNTER — HOSPITAL ENCOUNTER (OUTPATIENT)
Dept: MAMMOGRAPHY | Facility: HOSPITAL | Age: 73
Discharge: HOME | End: 2023-11-06
Attending: SURGERY
Payer: MEDICARE

## 2023-11-06 DIAGNOSIS — R92.1 BREAST CALCIFICATION, RIGHT: ICD-10-CM

## 2023-11-06 PROCEDURE — 77061 BREAST TOMOSYNTHESIS UNI: CPT | Performed by: SURGERY

## 2023-11-06 PROCEDURE — 77065 DX MAMMO INCL CAD UNI: CPT | Performed by: SURGERY

## 2024-03-19 ENCOUNTER — TELEPHONE (OUTPATIENT)
Dept: PHYSICAL THERAPY | Facility: HOSPITAL | Age: 74
End: 2024-03-19

## 2024-03-19 ENCOUNTER — TELEPHONE (OUTPATIENT)
Dept: INTERNAL MEDICINE CLINIC | Facility: CLINIC | Age: 74
End: 2024-03-19

## 2024-03-19 DIAGNOSIS — N32.81 OAB (OVERACTIVE BLADDER): Primary | ICD-10-CM

## 2024-03-19 NOTE — TELEPHONE ENCOUNTER
Pt was given an order for PT that's dated 4/13/23 it will be expiring soon calling to see if she can get a new order/referral for PT since she is scheduled for June 3rd at [facility]. For 10 sessions the last one will be in August 13th pt is requesting a call order is placed so she can contact PT since they are holding those appt's for her.

## 2024-04-17 ENCOUNTER — TELEPHONE (OUTPATIENT)
Dept: INTERNAL MEDICINE CLINIC | Facility: CLINIC | Age: 74
End: 2024-04-17

## 2024-04-17 DIAGNOSIS — Z00.00 ROUTINE GENERAL MEDICAL EXAMINATION AT A HEALTH CARE FACILITY: Primary | ICD-10-CM

## 2024-04-17 DIAGNOSIS — E78.00 PURE HYPERCHOLESTEROLEMIA: ICD-10-CM

## 2024-04-17 DIAGNOSIS — E11.9 TYPE 2 DIABETES MELLITUS WITHOUT COMPLICATION, WITHOUT LONG-TERM CURRENT USE OF INSULIN (HCC): ICD-10-CM

## 2024-04-24 ENCOUNTER — PATIENT MESSAGE (OUTPATIENT)
Dept: INTERNAL MEDICINE CLINIC | Facility: CLINIC | Age: 74
End: 2024-04-24

## 2024-04-24 NOTE — TELEPHONE ENCOUNTER
From: Annie Vogel  To: Farzanehblanka Irene  Sent: 4/24/2024 9:01 AM CDT  Subject: info for visit re:tests    Henna, I will be bringing test results from my [facility] appt with Dr. Zhou : A1C and CMP. I'll have the remaining tests you ordered done at [facility].    Besides my annual physical, I want to talk to you about another experience with pressure on the left side of my head which reappeared 2 weeks ago along with shadow/double vision. I saw Dr Garcia last week and he again referred me to my retina specialist. That appt with Dr Chung is this Friday.    I am still waiting for Pelvic PT to begin, due to a maternity leave at [address] [place] [address].    Lastly, do I need a pelvic exam or gynecologist? I didn't think so post hysterectomy, but wonder since my mom had ovarian cancer.    Thanks for reading. See you soon.  Annie Vogel

## 2024-04-25 ENCOUNTER — LAB ENCOUNTER (OUTPATIENT)
Dept: LAB | Age: 74
End: 2024-04-25
Attending: NURSE PRACTITIONER
Payer: MEDICARE

## 2024-04-25 DIAGNOSIS — Z00.00 ROUTINE GENERAL MEDICAL EXAMINATION AT A HEALTH CARE FACILITY: ICD-10-CM

## 2024-04-25 DIAGNOSIS — E78.00 PURE HYPERCHOLESTEROLEMIA: ICD-10-CM

## 2024-04-25 DIAGNOSIS — E11.9 TYPE 2 DIABETES MELLITUS WITHOUT COMPLICATION, WITHOUT LONG-TERM CURRENT USE OF INSULIN (HCC): ICD-10-CM

## 2024-04-25 LAB
BASOPHILS # BLD AUTO: 0.08 X10(3) UL (ref 0–0.2)
BASOPHILS NFR BLD AUTO: 1.5 %
CHOLEST SERPL-MCNC: 184 MG/DL (ref ?–200)
EOSINOPHIL # BLD AUTO: 0.26 X10(3) UL (ref 0–0.7)
EOSINOPHIL NFR BLD AUTO: 4.7 %
ERYTHROCYTE [DISTWIDTH] IN BLOOD BY AUTOMATED COUNT: 13.3 %
FASTING PATIENT LIPID ANSWER: YES
HCT VFR BLD AUTO: 44.5 %
HDLC SERPL-MCNC: 93 MG/DL (ref 40–59)
HGB BLD-MCNC: 13.9 G/DL
IMM GRANULOCYTES # BLD AUTO: 0.01 X10(3) UL (ref 0–1)
IMM GRANULOCYTES NFR BLD: 0.2 %
LDLC SERPL CALC-MCNC: 78 MG/DL (ref ?–100)
LYMPHOCYTES # BLD AUTO: 1.94 X10(3) UL (ref 1–4)
LYMPHOCYTES NFR BLD AUTO: 35.2 %
MCH RBC QN AUTO: 27.9 PG (ref 26–34)
MCHC RBC AUTO-ENTMCNC: 31.2 G/DL (ref 31–37)
MCV RBC AUTO: 89.2 FL
MONOCYTES # BLD AUTO: 0.43 X10(3) UL (ref 0.1–1)
MONOCYTES NFR BLD AUTO: 7.8 %
NEUTROPHILS # BLD AUTO: 2.79 X10 (3) UL (ref 1.5–7.7)
NEUTROPHILS # BLD AUTO: 2.79 X10(3) UL (ref 1.5–7.7)
NEUTROPHILS NFR BLD AUTO: 50.6 %
NONHDLC SERPL-MCNC: 91 MG/DL (ref ?–130)
PLATELET # BLD AUTO: 244 10(3)UL (ref 150–450)
RBC # BLD AUTO: 4.99 X10(6)UL
TRIGL SERPL-MCNC: 68 MG/DL (ref 30–149)
TSI SER-ACNC: 2.75 MIU/ML (ref 0.36–3.74)
VLDLC SERPL CALC-MCNC: 11 MG/DL (ref 0–30)
WBC # BLD AUTO: 5.5 X10(3) UL (ref 4–11)

## 2024-04-25 PROCEDURE — 85025 COMPLETE CBC W/AUTO DIFF WBC: CPT

## 2024-04-25 PROCEDURE — 36415 COLL VENOUS BLD VENIPUNCTURE: CPT

## 2024-04-25 PROCEDURE — 80061 LIPID PANEL: CPT

## 2024-04-25 PROCEDURE — 84443 ASSAY THYROID STIM HORMONE: CPT

## 2024-04-30 NOTE — PROGRESS NOTES
Subjective:   Annie Vogel is a 73 year old female who presents for a MA (Medicare Advantage) Supervisit (Once per calendar year) and Here for AWV and follow up of chronic health issues.  discussed consider tdap at pharmacy.  Discussed PCV 20 will consider next year  last 2018.  Colonoscopy this winter.  Dr Hawthorne.    Diabetes  gamal Krause.  On ace and statin.  A1c 6.7   foot exam completed with Dr Zhou.  Eye exam Dr Chung.  Was to send report but uncertain if we received.  Will call for exam.      Dyslipidemia.  Simvastatin 20mg.  LDL 78    OAB  tolterodine   she has upcoming appts for pelvic floor therapy.     Dermatology  Dr Bowman    Hx Atypical ductal hyperplasia   Dr Hutchins   Has upcoming with [facility].     Left side left pressure with vision changes.  Temple with radiation to forehead and cheek.  Same as last August. (Temporal arteritis ruled out) Resolved and returned 3 weeks ago.   Waking from sleep at times.  Following with Dr Chung for eye pressure and Dr Garcia  up to date on exam.    Discussed possible TMJ  will try 2 aleve bid.   Check MRI brain.        History/Other:   Fall Risk Assessment:   She has been screened for Falls and is High Risk. Fall Prevention information provided to patient in After Visit Summary.    Do you feel unsteady when standing or walking?: No  Do you worry about falling?: No  Have you fallen in the past year?: Yes  How many times have you fallen?: (P) 1  Were you injured?: (P) No     Cognitive Assessment:   She had a completely normal cognitive assessment - see flowsheet entries       Functional Ability/Status:   Annie Vogel has some abnormal functions as listed below:  She has Vision problems based on screening of functional status.       Depression Screening (PHQ-2/PHQ-9): PHQ-2 SCORE: 0  , done 4/25/2024             Advanced Directives:   She does have a Living Will but we do NOT have it on file in Epic.    She does have a POA but we  do NOT have it on file in Epic.    Discussed Advance Care Planning with patient (and family/surrogate if present). Standard forms made available to patient in After Visit Summary.      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    Current Medications:  No outpatient medications have been marked as taking for the 5/1/24 encounter (Office Visit) with Farzaneh Irene APRN.       Medical History:  She  has a past medical history of Atypical ductal hyperplasia of right breast (Nov. 2015), Calculus of kidney, Cataract, High blood pressure, High cholesterol, History of blood transfusion (1986), Osteoarthritis, PONV (postoperative nausea and vomiting), Type II or unspecified type diabetes mellitus without mention of complication, not stated as uncontrolled, and Visual impairment.  Surgical History:  She  has a past surgical history that includes breast surgery procedure unlisted (1/1/1969); cataract (1/3/2011); colonoscopy (1/1/2005); hysterectomy (1/1/1988); oophorectomy (10/1/2006); edgardo biopsy stereo nodule 2 site bilat (cpt=19081/96049) (2007); colonoscopy (N/A, 12/8/2014); Breast biopsy (Right, 11/10/15); needle biopsy right (10/2015); edgardo biopsy stereo nodule 1 site right (cpt=19081) (01/2018); edgardo localization wire 1 site right (cpt=19281) (11/2015); and edgardo localization wire 1 site left (cpt=19281) (1968/2007).   Family History:  Her family history includes Asthma in her son; Breast Cancer (age of onset: 79) in  her mother; CABG in her father; Cancer in her mother; Diabetes in her father; Emphysema in her mother; Heart Disease in her father and maternal grandfather; Osteoporosis in her mother; Other in her father; Ovarian Cancer (age of onset: 79) in her mother; Renal Failure in her father; Thyroid Disorder in her sister.  Social History:  She  reports that she has never smoked. She has never used smokeless tobacco. She reports that she does not drink alcohol and does not use drugs.    Tobacco:  She has never smoked tobacco.    CAGE Alcohol Screen:   CAGE screening score of 0 on 4/25/2024, showing low risk of alcohol abuse.      Patient Care Team:  Oseas Holbrook MD as PCP - General  Kamilah Zhou MD (ENDOCRINOLOGY)  Tam Hawthorne MD (GASTROENTEROLOGY)  Desiree Goldberg MD as Consulting Physician (NEUROLOGY)  Flora Jackson APRN as Breast Navigator (ONCOLOGY)  Nazario Chung MD as Consulting Physician (OPHTHALMOLOGY)  Farzaneh Irene APRN (Nurse Practitioner Acute Care)    Review of Systems     Negative except as above     Objective:   Physical Exam  Physical Exam  General Appearance:  Alert, cooperative, no distress, appears stated age   Head:  Normocephalic, without obvious abnormality, atraumatic   Eyes:  PERRL, conjunctiva/corneas clear, EOM's intact both eyes   Ears:  Normal TM's and external ear canals, both ears   Nose: Nares normal, septum midline,mucosa normal, no drainage or sinus tenderness   Throat: Lips, mucosa, and tongue normal; teeth and gums normal   Neck: Supple, symmetrical, trachea midline, no adenopathy;  thyroid: not enlarged, symmetric, no JVD   Back:   Symmetric, no curvature, ROM normal, no CVA tenderness   Lungs:   Clear to auscultation bilaterally, respirations unlabored   Heart:  Regular rate and rhythm, S1 and S2 normal,  murmur   Abdomen:   Soft, non-tender, bowel sounds active all four quadrants,  no masses,    Extremities: Extremities normal, atraumatic, no edema   Pulses: symmetric   Skin:  Skin color, texture, turgor normal,    Lymph nodes: Cervical, supraclavicular nodes normal   Neurologic: Normal           /60   Pulse 70   Temp 98.4 °F (36.9 °C)   Wt 174 lb 8 oz (79.2 kg)   BMI 29.49 kg/m²  Estimated body mass index is 29.49 kg/m² as calculated from the following:    Height as of 8/7/23: 5' 4.5\" (1.638 m).    Weight as of this encounter: 174 lb 8 oz (79.2 kg).    Medicare Hearing Assessment:   Grossly intact.     Visual Acuity:   Right Eye Visual Acuity: Corrected Right Eye Chart Acuity: 20/40   Left Eye Visual Acuity: Corrected Left Eye Chart Acuity: 20/40   Both Eyes Visual Acuity: Corrected Both Eyes Chart Acuity: 20/40   Able To Tolerate Visual Acuity: Yes        Assessment & Plan:   Annie Vogel is a 73 year old female who presents for a Medicare Assessment.     1. Routine general medical examination at a health care facility (Primary)  2. Type 2 diabetes mellitus without complication, without long-term current use of insulin (HCC) stable  same meds.  Dr Zhou.  Will call for eye exam as well.    3. Pure hypercholesterolemia  stable  simvastatin.  LDL to goal.   4. Nodular thyroid disease  stable  per Dr Zhou  5. Vitamin D deficiency  stable  cont supplement.   Overview:  9/24/18 labs   6. Anxiety  stable  monitor.   7. 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  Has upcoming appt.   8. Microcalcification of right breast on mammogram  stable Dr Stevens.  Has upcoming appt.   9. OAB (overactive bladder)  tolteridine.  Has upcomgin appts for pelvic floor therapy.   10. History of atypical hyperplasia of breast  stable Dr Nicolas  Has upcoming appt.   11. Family history of malignant neoplasm of ovary  see below.   12. Epiretinal membrane (ERM) of left eye  stable  monitor.   13. Actinic keratosis  stable  per derm.    14. Encounter for screening mammogram for high-risk patient  15. Screen for colon cancer  -     GASTRO - INTERNAL  16. New  onset headache  MRI brain.    -     MRI BRAIN (CPT=70551); Future; Expected date: 05/01/2024  17. Facial pressure  suspect may be TMJ inflammation.  Try naprosyn / aleve 2 tabs bid x 7-14 days.  She will also discuss with dentist.  She does not chew gum.    -     MRI BRAIN (CPT=70551); Future; Expected date: 05/01/2024  18. Pelvic pressure in female  check pelvic US.    -     US PELVIS W EV (CPT=76856/86849); Future; Expected date: 05/01/2024  19. Family history of ovarian cancer  duplicate.   -     US PELVIS W EV (CPT=76856/50262); Future; Expected date: 05/01/2024    The patient indicates understanding of these issues and agrees to the plan.  Reinforced healthy diet, lifestyle, and exercise.      No follow-ups on file.     JAYCOB Rawls, 4/30/2024     Supplementary Documentation:   General Health:  In the past six months, have you lost more than 10 pounds without trying?: 2 - No  Has your appetite been poor?: No  Type of Diet: Diabetic  How does the patient maintain a good energy level?: Daily Walks  How would you describe your daily physical activity?: Moderate  How would you describe your current health state?: Good  How do you maintain positive mental well-being?: Social Interaction;Games;Visiting Friends;Visiting Family  On a scale of 0 to 10, with 0 being no pain and 10 being severe pain, what is your pain level?: 1 - (Mild)  In the past six months, have you experienced urine leakage?: 1-Yes  At any time do you feel concerned for the safety/well-being of yourself and/or your children, in your home or elsewhere?: No  Have you had any immunizations at another office such as Influenza, Hepatitis B, Tetanus, or Pneumococcal?: Yes       Annie Vogel's SCREENING SCHEDULE   Tests on this list are recommended by your physician but may not be covered, or covered at this frequency, by your insurer.   Please check with your insurance carrier before scheduling to verify coverage.   PREVENTATIVE SERVICES  FREQUENCY &  COVERAGE DETAILS LAST COMPLETION DATE   Diabetes Screening    Fasting Blood Sugar /  Glucose    One screening every 12 months if never tested or if previously tested but not diagnosed with pre-diabetes   One screening every 6 months if diagnosed with pre-diabetes Lab Results   Component Value Date     (H) 08/07/2023        Cardiovascular Disease Screening    Lipid Panel  Cholesterol  Lipoprotein (HDL)  Triglycerides Covered every 5 years for all Medicare beneficiaries without apparent signs or symptoms of cardiovascular disease Lab Results   Component Value Date    CHOLEST 184 04/25/2024    HDL 93 (H) 04/25/2024    LDL 78 04/25/2024    LDLD 69 04/23/2021    TRIG 68 04/25/2024         Electrocardiogram (EKG)   Covered if needed at Welcome to Medicare, and non-screening if indicated for medical reasons 09/21/2022      Ultrasound Screening for Abdominal Aortic Aneurysm (AAA) Covered once in a lifetime for one of the following risk factors    Men who are 65-75 years old and have ever smoked    Anyone with a family history -     Colorectal Cancer Screening  Covered for ages 50-85; only need ONE of the following:    Colonoscopy   Covered every 10 years    Covered every 2 years if patient is at high risk or previous colonoscopy was abnormal 12/08/2014    Health Maintenance   Topic Date Due    Colorectal Cancer Screening  12/08/2024       Flexible Sigmoidoscopy   Covered every 4 years -    Fecal Occult Blood Test Covered annually -   Bone Density Screening    Bone density screening    Covered every 2 years after age 65 if diagnosed with risk of osteoporosis or estrogen deficiency.    Covered yearly for long-term glucocorticoid medication use (Steroids) Last Dexa Scan:    XR DEXA BONE DENSITY AXIAL (CPT=77080) 09/17/2019      No recommendations at this time   Pap and Pelvic    Pap   Covered every 2 years for women at normal risk; Annually if at high risk -  No recommendations at this time    Chlamydia  Annually if high risk -  No recommendations at this time   Screening Mammogram    Mammogram     Recommend annually for all female patients aged 40 and older    One baseline mammogram covered for patients aged 35-39 11/06/2023    Health Maintenance   Topic Date Due    Mammogram  11/06/2024       Immunizations    Influenza Covered once per flu season  Please get every year 10/15/2023  No recommendations at this time    Pneumococcal Each vaccine ([id number] & [id number]) covered once after 65 Prevnar 13: 12/07/2016    [id number]: 03/26/2018     No recommendations at this time    Hepatitis B One screening covered for patients with certain risk factors   -  No recommendations at this time    Tetanus Toxoid Not covered by Medicare Part B unless medically necessary (cut with metal); may be covered with your pharmacy prescription benefits 06/27/2008    Tetanus, Diptheria and Pertusis TD and TDaP Not covered by Medicare Part B -  No recommendations at this time    Zoster Not covered by Medicare Part B; may be covered with your pharmacy  prescription benefits -  No recommendations at this time     Diabetes      Hemoglobin A1C Annually; if last result is elevated, may be asked to retest more frequently.    Medicare covers every 3 months Lab Results   Component Value Date     (H) 04/07/2023    A1C 7.2 (H) 04/07/2023       Hemoglobin A1C Test due on 10/07/2023    Creat/alb ratio Annually Lab Results   Component Value Date    MICROALBCREA 17.8 04/07/2023    MALBCRECALC UNABLE TO CALCULATE 09/19/2013       LDL Annually Lab Results   Component Value Date    LDL 78 04/25/2024       Dilated Eye Exam Annually Last Diabetic Eye Exam:  Last Dilated Eye Exam: 04/26/24  Eye Exam shows Diabetic Retinopathy?: No       Annual Monitoring of Persistent Medications (ACE/ARB, digoxin diuretics, anticonvulsants)    Potassium Annually Lab Results   Component Value Date    K 4.2 08/07/2023         Creatinine   Annually Lab Results    Component Value Date    CREATSERUM 0.71 08/07/2023         BUN Annually Lab Results   Component Value Date    BUN 14 08/07/2023       Drug Serum Conc Annually No results found for: \"DIGOXIN\", \"DIG\", \"VALP\"

## 2024-05-01 ENCOUNTER — TELEPHONE (OUTPATIENT)
Dept: INTERNAL MEDICINE CLINIC | Facility: CLINIC | Age: 74
End: 2024-05-01

## 2024-05-01 ENCOUNTER — OFFICE VISIT (OUTPATIENT)
Dept: INTERNAL MEDICINE CLINIC | Facility: CLINIC | Age: 74
End: 2024-05-01
Payer: MEDICARE

## 2024-05-01 VITALS
DIASTOLIC BLOOD PRESSURE: 60 MMHG | TEMPERATURE: 98 F | HEART RATE: 70 BPM | WEIGHT: 174.5 LBS | BODY MASS INDEX: 29 KG/M2 | SYSTOLIC BLOOD PRESSURE: 114 MMHG

## 2024-05-01 DIAGNOSIS — R92.0 MICROCALCIFICATION OF RIGHT BREAST ON MAMMOGRAM: ICD-10-CM

## 2024-05-01 DIAGNOSIS — L57.0 ACTINIC KERATOSIS: ICD-10-CM

## 2024-05-01 DIAGNOSIS — H35.372 EPIRETINAL MEMBRANE (ERM) OF LEFT EYE: ICD-10-CM

## 2024-05-01 DIAGNOSIS — Z87.42 HISTORY OF ATYPICAL HYPERPLASIA OF BREAST: ICD-10-CM

## 2024-05-01 DIAGNOSIS — Z80.41 FAMILY HISTORY OF OVARIAN CANCER: ICD-10-CM

## 2024-05-01 DIAGNOSIS — N32.81 OAB (OVERACTIVE BLADDER): ICD-10-CM

## 2024-05-01 DIAGNOSIS — Z80.41 FAMILY HISTORY OF MALIGNANT NEOPLASM OF OVARY: ICD-10-CM

## 2024-05-01 DIAGNOSIS — R44.8 FACIAL PRESSURE: ICD-10-CM

## 2024-05-01 DIAGNOSIS — Z12.31 ENCOUNTER FOR SCREENING MAMMOGRAM FOR HIGH-RISK PATIENT: ICD-10-CM

## 2024-05-01 DIAGNOSIS — Z00.00 ROUTINE GENERAL MEDICAL EXAMINATION AT A HEALTH CARE FACILITY: Primary | ICD-10-CM

## 2024-05-01 DIAGNOSIS — E78.00 PURE HYPERCHOLESTEROLEMIA: ICD-10-CM

## 2024-05-01 DIAGNOSIS — E11.9 TYPE 2 DIABETES MELLITUS WITHOUT COMPLICATION, WITHOUT LONG-TERM CURRENT USE OF INSULIN (HCC): ICD-10-CM

## 2024-05-01 DIAGNOSIS — E04.1 NODULAR THYROID DISEASE: ICD-10-CM

## 2024-05-01 DIAGNOSIS — F41.9 ANXIETY: ICD-10-CM

## 2024-05-01 DIAGNOSIS — R51.9 NEW ONSET HEADACHE: ICD-10-CM

## 2024-05-01 DIAGNOSIS — Z12.11 SCREEN FOR COLON CANCER: ICD-10-CM

## 2024-05-01 DIAGNOSIS — R10.2 PELVIC PRESSURE IN FEMALE: ICD-10-CM

## 2024-05-01 DIAGNOSIS — N60.81 FLAT EPITHELIAL ATYPIA (FEA) OF RIGHT BREAST: ICD-10-CM

## 2024-05-01 DIAGNOSIS — E55.9 VITAMIN D DEFICIENCY: ICD-10-CM

## 2024-05-01 PROCEDURE — G0439 PPPS, SUBSEQ VISIT: HCPCS | Performed by: NURSE PRACTITIONER

## 2024-05-01 PROCEDURE — 99499 UNLISTED E&M SERVICE: CPT | Performed by: NURSE PRACTITIONER

## 2024-05-01 PROCEDURE — 96160 PT-FOCUSED HLTH RISK ASSMT: CPT | Performed by: NURSE PRACTITIONER

## 2024-05-01 PROCEDURE — 99214 OFFICE O/P EST MOD 30 MIN: CPT | Performed by: NURSE PRACTITIONER

## 2024-05-05 ENCOUNTER — HOSPITAL ENCOUNTER (OUTPATIENT)
Dept: MRI IMAGING | Facility: HOSPITAL | Age: 74
End: 2024-05-05
Attending: NURSE PRACTITIONER
Payer: MEDICARE

## 2024-05-05 ENCOUNTER — HOSPITAL ENCOUNTER (OUTPATIENT)
Dept: MRI IMAGING | Facility: HOSPITAL | Age: 74
Discharge: HOME | End: 2024-05-05
Attending: NURSE PRACTITIONER
Payer: MEDICARE

## 2024-05-05 DIAGNOSIS — R44.8 FACIAL PRESSURE: ICD-10-CM

## 2024-05-05 DIAGNOSIS — R51.9 NEW ONSET HEADACHE: ICD-10-CM

## 2024-05-05 PROCEDURE — 70551 MRI BRAIN STEM W/O DYE: CPT | Performed by: NURSE PRACTITIONER

## 2024-05-08 ENCOUNTER — APPOINTMENT (OUTPATIENT)
Dept: PHYSICAL THERAPY | Facility: HOSPITAL | Age: 74
End: 2024-05-08
Attending: NURSE PRACTITIONER
Payer: MEDICARE

## 2024-05-08 ENCOUNTER — HOSPITAL ENCOUNTER (OUTPATIENT)
Dept: ULTRASOUND IMAGING | Age: 74
Discharge: HOME | End: 2024-05-08
Attending: NURSE PRACTITIONER
Payer: MEDICARE

## 2024-05-08 DIAGNOSIS — R10.2 PELVIC PRESSURE IN FEMALE: ICD-10-CM

## 2024-05-08 DIAGNOSIS — Z80.41 FAMILY HISTORY OF OVARIAN CANCER: ICD-10-CM

## 2024-05-08 PROCEDURE — 76856 US EXAM PELVIC COMPLETE: CPT | Performed by: NURSE PRACTITIONER

## 2024-05-08 PROCEDURE — 76830 TRANSVAGINAL US NON-OB: CPT | Performed by: NURSE PRACTITIONER

## 2024-05-09 ENCOUNTER — OFFICE VISIT (OUTPATIENT)
Dept: SURGERY | Facility: CLINIC | Age: 74
End: 2024-05-09
Payer: MEDICARE

## 2024-05-09 DIAGNOSIS — R92.8 ABNORMAL MAMMOGRAM: Primary | ICD-10-CM

## 2024-05-09 PROCEDURE — 99214 OFFICE O/P EST MOD 30 MIN: CPT

## 2024-05-13 VITALS
HEART RATE: 70 BPM | DIASTOLIC BLOOD PRESSURE: 62 MMHG | RESPIRATION RATE: 16 BRPM | OXYGEN SATURATION: 97 % | BODY MASS INDEX: 29.21 KG/M2 | WEIGHT: 173.19 LBS | HEIGHT: 64.5 IN | TEMPERATURE: 98 F | SYSTOLIC BLOOD PRESSURE: 114 MMHG

## 2024-05-13 NOTE — PROGRESS NOTES
Breast Surgery Follow-Up Visit     Diagnosis: ADH/FEA of the Right breast status post Right breast excisional biopsy with preoperative wire localization on November 10, 2015.      Stage: N/A     Disease Status:   Surgical therapy complete, chemoprevention declined, high risk surveillance ongoing.      History:   This 73 year old woman presented with imaging detected right breast ADH/FEA presents today for a surveillance visit.     She does have significant past history for breast diseases or breast biopsy including excision of a benign cyst from the Left breast in 1968 and a Left breast biopsy in 2007 which was benign. She does not have family history of breast cancer.      On September 29, 2015 she presented for Bilateral Screening Mammogram that showed scattered fibroglandular densities (25-50% glandular) with no changes on the Left but with new densities on the Right for which additional imaging was recommended. On October 7, 2015 her R diagnostic mammogram showed nodular asymmetry persists at the 6:00 position right breast with a 7mm poechoic nodule on US for which biopsy was recommended. US guided biopsy took place on October 13, 2015 and pathology confirmed ADH/FEA.      She underwent excision of the atypia without complication. She met with Dr. Brooke to discuss options for chemoprevention and she elected to forego secondary to concern for side effects. She denies any new breast related concerns today.  Most recent mammogram was a right diagnostic mammogram on 11/6/2023 which showed calcifications in the right breast that appear to be likely benign.  She is here today for evaluation and recommendations for further therapy.        Past Medical History:    Atypical ductal hyperplasia of right breast    Calculus of kidney    Cataract    High blood pressure    High cholesterol    History of blood transfusion    Osteoarthritis    right shoulder    PONV (postoperative nausea and vomiting)    Type II or unspecified  type diabetes mellitus without mention of complication, not stated as uncontrolled    Visual impairment    glasses       Past Surgical History:   Procedure Laterality Date    Breast biopsy Right 11/10/15    excisional bx of right breast    Breast surgery procedure unlisted  1969    Biopsy breast open, LEFT NEGATIVE  MARKER PLACED IN BREAST    Cataract  1/3/2011    Cataract surgery, LEFT    Colonoscopy  2005    negative per patient    Colonoscopy N/A 2014    Procedure: COLONOSCOPY;  Surgeon: Tam Hawthorne MD;  Location:  ENDOSCOPY    Hysterectomy  1988    WITH SINGLE OOPHERECTOMY    Nelia biopsy stereo nodule 1 site right (cpt=19081)  2018    fibrocystic changes    Nelia biopsy stereo nodule 2 site bilat (cpt=19081/28838)      benign    Nelia localization wire 1 site left (cpt=19281)      benign/fibroadenoma    Nelia localization wire 1 site right (cpt=19281)  2015    ADH;FEA;Intraductal papilloma    Needle biopsy right  10/2015    atypia    Oophorectomy  10/1/2006       Gynecological History:  Pt is a   She achieved menarche at age 13   Age of Menopause: 30's  Type: Hysterectomy (Both ovaries now removed)  She has no history of hormone replacement therapy.  She has a history of oral contraceptive use for a few years, last in .  She has recieved infertility treatment to achieve pregnancy for over 6 years unsuccessfully.    Medications:     tolterodine ER 2 MG Oral Capsule SR 24 Hr Take 1 capsule (2 mg total) by mouth daily. 90 capsule 3    Ascorbic Acid (VITAMIN C) 1000 MG Oral Tab Take 1 tablet (1,000 mg total) by mouth daily.      JANUMET  MG Oral Tab TAKE 1 TABLET BY MOUTH  TWICE DAILY WITH MEALS IN  THE MORNING AND IN THE  EVENING 180 tablet 3    JARDIANCE 25 MG Oral Tab TAKE 1 TABLET BY MOUTH  DAILY 90 tablet 3    LISINOPRIL 5 MG Oral Tab TAKE 1 TABLET BY MOUTH  DAILY 90 tablet 3    SIMVASTATIN 20 MG Oral Tab TAKE 1 TABLET BY MOUTH AT  NIGHT 90 tablet 3    Blood  Glucose Monitoring Suppl (ONETOUCH VERIO REFLECT) w/Device Does not apply Kit 1 each by Does not apply route 2 (two) times a day. 1 kit 0    Glucose Blood In Vitro Strip Test 2 times daily 200 each 1    B Complex Vitamins (VITAMIN B COMPLEX OR) Take by mouth daily.        aspirin 81 MG Oral Tab EC Take 1 tablet (81 mg total) by mouth daily.      Multiple Vitamins-Minerals (CENTRUM) Oral Tab Take 1 tablet by mouth daily.         Allergies:    No Known Allergies    Family History:   Family History   Problem Relation Age of Onset    Other (Emphysema[other]) Mother     Other (Osteoporosis[other]) Mother     Cancer Mother         Ovarian    Ovarian Cancer Mother 79    Breast Cancer Mother 79    Diabetes Father     Heart Disease Father     Other (Other[other]) Father     Other (CABG[other]) Father     Other (Renal Failure[other]) Father     Asthma Son     Heart Disease Maternal Grandfather     Thyroid Disorder Sister        She is not of Ashkenazi [employer] ancestry.    Social History:  History   Alcohol Use No       History   Smoking Status    Never   Smokeless Tobacco    Never       Review of Systems:  General:   The patient denies, fever, chills, night sweats, fatigue, generalized weakness, change in appetite or weight loss.    HEENT:     The patient denies eye irritation, cataracts, redness, glaucoma, yellowing of the eyes, change in vision, color blindness, or wearing contacts/glasses. The patient denies hearing loss, ringing in the ears, ear drainage, earaches, nasal congestion, nose bleeds, snoring, pain in mouth/throat, hoarseness, change in voice, facial trauma.    Respiratory:  The patient denies chronic cough, phlegm, hemoptysis, pleurisy/chest pain, pneumonia, asthma, wheezing, difficulty in breathing with exertion, emphysema, chronic bronchitis, shortness of breath or abnormal sound when breathing.     Cardiovascular:  There is no history of chest pain, chest pressure/discomfort, palpitations, irregular  heartbeat, fainting or near-fainting, difficulty breathing when lying flat, SOB/Coughing at night, swelling of the legs or chest pain while walking.    Breasts:  See history of present illness    Gastrointestinal:     There is no history of difficulty or pain with swallowing, reflux symptoms, vomiting, dark or bloody stools, constipation, yellowing of the skin, indigestion, nausea, change in bowel habits, diarrhea, abdominal pain or vomiting blood.     Genitourinary:  The patient denies frequent urination, needing to get up at night to urinate, urinary hesitancy or retaining urine, painful urination, urinary incontinence, decreased urine stream, blood in the urine or vaginal/penile discharge.    Skin:    The patient denies rash, itching, skin lesions, dry skin, change in skin color or change in moles.     Hematologic/Lymphatic:  The patient denies easily bruising or bleeding or persistent swollen glands or lymph nodes.     Musculoskeletal:  The patient denies muscle aches/pain, joint pain, stiff joints, neck pain, back pain or bone pain.    Neuropsychiatric:  There is no history of migraines or severe headaches, seizure/epilepsy, speech problems, coordination problems, trembling/tremors, fainting/black outs, dizziness, memory problems, loss of sensation/numbness, problems walking, weakness, tingling or burning in hands/feet. There is no history of abusive relationship, bipolar disorder, sleep disturbance, anxiety, depression or feeling of despair.    Endocrine:    There is no history of poor/slow wound healing, weight loss/gain, fertility or hormone problems, cold intolerance, thyroid disease.     Allergic/Immunologic:  There is no history of hives, hay fever, angioedema or anaphylaxis.    Ht 1.638 m (5' 4.5\")   Wt 78.6 kg (173 lb 3.2 oz)   BMI 29.27 kg/m²     Physical Examination:  This is a well-nourished, alert and oriented woman. There is not palpable cervical, supraclavicular or axillary adenopathy.  The neck  is supple with a midline trachea and no thyromegaly. Range of motion is good at both shoulders. Breasts are symmetrical. The tumorectomy site is in the Right breast and shows no evidence of local recurrence. Both nipples are everted with no discharge. There is not dominant masses, focal nodularity or skin changes on either side. The abdomen is soft, flat and nontender, with no palpable masses or organomegaly.      Risk Estimate:  Mraianne:  Five Year is:  9.1%.  Marianne Lifetime is:  31.3%.  BRCA Pro Lifetime is:  7.4%  Tyrer-Cuzick Five Year is: 8.8%  Tyrer-Cuzick Lifetime is: 30.7%     Probability of BRCA 1 or 2 alteration is:  0.8% using BRCA PRO, and 0.3% using Tyrer-Cuzick, and 3% using Myriad Prevalence Tables.     Impression:   Ms. Annie Vogel is a 73 year old woman s/p excision of right breast ADH/FEA and h/o Left breast benign biopsies.     Discussion and Plan:  I had a discussion with the Patient regarding her breast exam. On exam today, she has no clinical evidence of malignancy bilaterally.  I reviewed her recent imaging and we discussed this at length.     I reviewed her breast cancer risk estimates to her and answered questions she had pertaining to her level of risk.  The patient's current five-year risk for breast cancer is elevated when compared to her peer group. We discussed factors that would further increase her risk for breast cancer over time to include age, future breast biopsy, as well as additional family members that may be diagnosed with breast cancer.        We then turned our discussion towards genetic counseling and testing as her likelihood for carrying a mutation is Low.  Based on the findings, I would not recommend Annie Vogel for counseling and testing unless an additional family member develops a malignancy. We discussed the implications of carrying a genetic mutation as well as both surveillance programs and surgical risk-reducing surgery. She was given information regarding our  cancer genetic program here at [facility].      With regard to risk-reducing interventions, we discussed lifestyle modifications including attention to diet, exercise, weight control, moderation in alcohol use, and avoidance of long-term hormone replacement therapy in the future.       We then touched upon the utility of chemoprevention. I reviewed that the FDA approved the use of Tamoxifen for breast cancer risk reduction in premenopausal women at increased risk for breast cancer based upon the results of the NSABP Breast Cancer Prevention Trial in 1998 and that several other agents have now been approved similarly for post-menopausal women. The initial criteria for inclusion included a 5 year risk of breast cancer greater than 1.67% based on the Marianne Model which in order to establish a favorable risk versus benefit profile. We reviewed Annie Vogel's risk, which is 9.1% over the next 5 years and therefore I do recommend chemoprophylaxis at this time, however, she declines this presently secondary to concern of side effects.      The patient was given ample opportunity for questions, and those questions were answered to her satisfaction.  She is due for bilateral diagnostic mammogram now, and that order was provided to her.  I will be in touch with her after her mammogram with her results.  She will return annually for clinical exam.  She has been encouraged to contact the office with any questions/concerns prior to her next appointment.

## 2024-05-17 ENCOUNTER — APPOINTMENT (OUTPATIENT)
Dept: PHYSICAL THERAPY | Facility: HOSPITAL | Age: 74
End: 2024-05-17
Attending: NURSE PRACTITIONER
Payer: MEDICARE

## 2024-05-22 ENCOUNTER — APPOINTMENT (OUTPATIENT)
Dept: PHYSICAL THERAPY | Facility: HOSPITAL | Age: 74
End: 2024-05-22
Attending: NURSE PRACTITIONER
Payer: MEDICARE

## 2024-05-29 ENCOUNTER — TELEPHONE (OUTPATIENT)
Dept: PHYSICAL THERAPY | Facility: HOSPITAL | Age: 74
End: 2024-05-29

## 2024-05-31 ENCOUNTER — APPOINTMENT (OUTPATIENT)
Dept: PHYSICAL THERAPY | Facility: HOSPITAL | Age: 74
End: 2024-05-31
Attending: NURSE PRACTITIONER
Payer: MEDICARE

## 2024-06-03 ENCOUNTER — OFFICE VISIT (OUTPATIENT)
Dept: PHYSICAL THERAPY | Facility: HOSPITAL | Age: 74
End: 2024-06-03
Attending: NURSE PRACTITIONER
Payer: MEDICARE

## 2024-06-03 DIAGNOSIS — N32.81 OAB (OVERACTIVE BLADDER): Primary | ICD-10-CM

## 2024-06-03 PROCEDURE — 97110 THERAPEUTIC EXERCISES: CPT

## 2024-06-03 PROCEDURE — 97112 NEUROMUSCULAR REEDUCATION: CPT

## 2024-06-03 PROCEDURE — 97162 PT EVAL MOD COMPLEX 30 MIN: CPT

## 2024-06-03 NOTE — PROGRESS NOTES
MUSCULOSKELETAL AND PELVIC FLOOR EVALUATION:     Diagnosis:   OAB (overactive bladder) (N32.81)       Referring Provider: Farzaneh Irene  Date of Evaluation:    6/3/2024    Precautions:  None Next MD visit:   none scheduled  Date of Surgery: n/a     PATIENT SUMMARY   Annie Vogel is a 73 year old female  who presents to therapy today with complaints of urine urgency. Has had years of constipation   Current symptoms include: urgency/frequency, constipation, and leakage    Pt describes pain level: none      Occupation/Activities: Gardening, waking, volunteering  PFDI-20: 116.66/300    Annie describes prior level of function: thinking les about bladder and urgency Pt goals include learn strategies to resolve urgency and leakage.  Past medical history was reviewed with Annie. Significant findings include  has a past medical history of Atypical ductal hyperplasia of right breast (Nov. 2015), Calculus of kidney, Cataract, High blood pressure, High cholesterol, History of blood transfusion (1986), Osteoarthritis, PONV (postoperative nausea and vomiting), Type II or unspecified type diabetes mellitus without mention of complication, not stated as uncontrolled, and Visual impairment.     URINARY HABITS  Types of symptoms: stress incontinence and urge incontinence  Events associated with the onset of urinary complaints: N/A  Abdominal/Vaginal Pressure complaints: no  Urinary Frequency: 6x  Urine Stream: strong  Amount: normal  Leaking occurs: urgency, SHIV  Episodes of Leakage: 0-2x/day  Amount of leakage: minimal  Pad use: pantyliner  Pad Change frequency: 1x  Nocturia: 0-1x  Fluid Intake: water  Bladder irritants: occasionally sugar free , chocolate some times give her urgency  Post void dribble: no  Hovering: yes  Empty bladder just in case: no  Do you ever leak urine without knowing it? no    BOWEL HABITS  Types of symptoms: Constipation   Frequency of bowel movements: 1x/day - every 3rd day  Stool consistency:  [place] Stool Scale: 1-4  Do you strain with defecation: Yes   Laxative use: No  probiotic    SEXUAL HEALTH STATUS  No pelvic pain    ASSESSMENT  Annie presents to physical therapy evaluation with primary c/o urine urgency. The results of the objective tests and measures show  decreased pelvic floor muscle strength and coordination, fair bowel and bladder habits, decreased core strength.  Functional deficits include but are not limited to embarrassment with leakage .  Signs and symptoms are consistent with diagnosis of OAB (overactive bladder) (N32.81)    . Pt and PT discussed evaluation findings, pathology, POC and HEP.  Pt voiced understanding and performs HEP correctly without reported pain. Skilled Pelvic Physical Therapy is medically necessary to address the above impairments and reach functional goals.    OBJECTIVE:   Posture: rounded shoulders  Pelvic Alignment: symmetrical   Gait: pt ambulates on level ground with normal mechanics.     External Palpation: no tenderness  Scars (location/surgery): none  Abdominal Wall Integrity: no restrictions   Diastasis Recti: no    Range Of Motion  Lumbar AROM screen: wnl  LE AROM screen: grossly WNL     Strength (MMT) 5/5 ANGELA LE  Transverse Abdominis: 1/5    Flexibility Summary: WNL ANGELA LE     Informed consent for internal pelvic evaluation given: Yes    External Observation:   Voluntary contraction: present   Voluntary relaxation: present  Involuntary contraction: present  Involuntary relaxation: present    Mons pubis: WNL  Labia majora: WNL  Labia minora: WNL  Urethral meatus: WNL  Introitus: WNL  Perineal body: WNL    Sensory/Reflex:  Vestibule: normal bilaterally  Anal Mineral: Not Tested    Internal Examination   Scar: none    Pelvic Floor Muscle strength: (PERF= Power/Endurance/Reps/Fast) MMT: 2/4/4/4  External Anal Sphincter: nt  Accessory Muscle Use: gluteals, adductors, abdominals    Tissue Laxity Test:  Anterior Wall: WNL  Posterior Wall: WNL  Apical:  WNL    Eccentric lengthening contraction: wnl  Bearing down Valsalva maneuver (2-3x): no prolape    Internal Palpation: WNL     Today's Treatment and Response:   Patient education was provided on objective findings of external and internal evaluation and expectations with treatment outcomes. Educated on pelvic anatomy and function with diagrams and pelvic model, bladder normatives, adequate hydration levels, TNE, instructed in bladder, bowel, and diet diary log and issued handout , stress/urge urinary incontinence strategies, coordination of diaphragmatic breathing and pelvic floor contraction , and knack/pelvic muscle brace    Manual therapy/NM Re-ed- internal retraining pelvic floor muscles to learn to perform kegel, diaphragmatic breathing , abdominal bracing      Patient was instructed in and issued a HEP for: diet/bladder/bowel diary , Kegel 10 repetitions 3x/day, 10 sec on/10 sec off, 2 sec on 2-4 sec off , diaphragmatic breathing     Charges: PT Eval Moderate Complexity, Elieser, NMR      Total Timed Treatment: 30 min     Total Treatment Time: 55 min     Based on clinical rationale and outcome measures, this evaluation involved Moderate Complexity decision making due to 1-2 personal factors/comorbidities, 3 body structures involved/activity limitations, and evolving symptoms including stress urinary incontinence, urge urinary incontinence, and constipation  PLAN OF CARE:    Goals: (to be met in 10 visits)  1. Independent in HEP and progression with improved understanding of bladder/bowel health, bladder retraining and long-term management.    2. Patient will demonstrate improved bladder voiding habits to voiding every 2 hours without urinary leakage.  3. Patient will demonstrate improve PFM isolation, coordination and strength so she is able to reduce urinary urge and prevent leakage during ADL's.  4. Pt will have increased transverse abdominis muscle strength to 2/5 to assist with supporting pelvic floor  muscles.   5. PFM contraction before increase intra-abdominal pressure.     Frequency / Duration: Patient will be seen for 1 x/week or a total of 10 visits over a 90 day period.  Treatment will include: Manual Therapy, Neuromuscular Re-education, Self-Care Home Management, Therapeutic Activities, Therapeutic Exercise, Home Exercise Program instruction, and Modalities to include: Electrical stimulation (unattended)     Education or treatment limitation: None  Rehab Potential:good      Patient/Family/Caregiver was advised of these findings, precautions, and treatment options and has agreed to actively participate in planning and for this course of care.    Thank you for your referral. Please co-sign or sign and return this letter via fax as soon as possible to [phone]. If you have any questions, please contact me at Dept: [phone]    Sincerely,  Electronically signed by therapist: Hoa Porter PT  Physician's certification required: Yes  I certify the need for these services furnished under this plan of treatment and while under my care.    X___________________________________________________ Date____________________    Certification From: 6/3/2024  To:9/1/2024

## 2024-06-05 ENCOUNTER — APPOINTMENT (OUTPATIENT)
Dept: PHYSICAL THERAPY | Facility: HOSPITAL | Age: 74
End: 2024-06-05
Attending: NURSE PRACTITIONER
Payer: MEDICARE

## 2024-06-10 ENCOUNTER — HOSPITAL ENCOUNTER (OUTPATIENT)
Dept: MAMMOGRAPHY | Facility: HOSPITAL | Age: 74
Discharge: HOME | End: 2024-06-10
Payer: MEDICARE

## 2024-06-10 DIAGNOSIS — R92.8 ABNORMAL MAMMOGRAM: ICD-10-CM

## 2024-06-10 PROCEDURE — 77062 BREAST TOMOSYNTHESIS BI: CPT

## 2024-06-10 PROCEDURE — 77066 DX MAMMO INCL CAD BI: CPT

## 2024-06-11 ENCOUNTER — OFFICE VISIT (OUTPATIENT)
Dept: PHYSICAL THERAPY | Facility: HOSPITAL | Age: 74
End: 2024-06-11
Attending: NURSE PRACTITIONER
Payer: MEDICARE

## 2024-06-11 PROCEDURE — 97110 THERAPEUTIC EXERCISES: CPT

## 2024-06-11 PROCEDURE — 97112 NEUROMUSCULAR REEDUCATION: CPT

## 2024-06-14 ENCOUNTER — APPOINTMENT (OUTPATIENT)
Dept: PHYSICAL THERAPY | Facility: HOSPITAL | Age: 74
End: 2024-06-14
Attending: NURSE PRACTITIONER
Payer: MEDICARE

## 2024-06-18 ENCOUNTER — OFFICE VISIT (OUTPATIENT)
Dept: PHYSICAL THERAPY | Facility: HOSPITAL | Age: 74
End: 2024-06-18
Attending: NURSE PRACTITIONER

## 2024-06-18 PROCEDURE — 97110 THERAPEUTIC EXERCISES: CPT

## 2024-06-18 PROCEDURE — 97112 NEUROMUSCULAR REEDUCATION: CPT

## 2024-06-18 NOTE — PROGRESS NOTES
Diagnosis:   OAB (overactive bladder) (N32.81)         Referring Provider: Farzaneh Irene  Date of Evaluation:    6/3/2024    Precautions:  None    PFDI-20: 116.66/300 Next MD visit:   none scheduled  Date of Surgery: n/a   Insurance Primary/Secondary: [employer] MCR / N/A     # Auth Visits: no auth, no limit            Subjective:   No leakage this past week. Stopped drinking drinks with artificial sweeteners.  Feels like she had improved coordination with breathing and kegel contraction.  Was sitting on lower toilet and understands better the position of squatting for bowel movement.  Taking benefiber every day. Drinking water and eating yogurt.     Pain: 0/10      Objective: Tx flow sheet     Initial evaluation:   URINARY HABITS  Types of symptoms: stress incontinence and urge incontinence  Urinary Frequency: 6x  Leaking occurs: urgency, SHIV  Episodes of Leakage: 0-2x/day  Amount of leakage: minimal  Pad use: pantyliner  Pad Change frequency: 1x  Nocturia: 0-1x  Hovering: yes      BOWEL HABITS  Types of symptoms: Constipation   Frequency of bowel movements: 1x/day - every 3rd day  Stool consistency: [place] Stool Scale: 1-4  Do you strain with defecation: Yes       SEXUAL HEALTH STATUS  No pelvic pain      Range Of Motion  Lumbar AROM screen: wnl  LE AROM screen: grossly WNL     Strength (MMT) 5/5 ANGELA LE  Transverse Abdominis: 1/5    Flexibility Summary: WNL ANGELA LE     Informed consent for internal pelvic evaluation given: Yes    External Observation:   Voluntary contraction: present   Voluntary relaxation: present  Involuntary contraction: present  Involuntary relaxation: present    Mons pubis: WNL  Labia majora: WNL  Labia minora: WNL  Urethral meatus: WNL  Introitus: WNL  Perineal body: WNL      Internal Examination     Pelvic Floor Muscle strength: (PERF= Power/Endurance/Reps/Fast) MMT: 2/4/4/4  Accessory Muscle Use: gluteals, adductors, abdominals    Tissue Laxity Test:  Anterior Wall: WNL  Posterior Wall:  WNL  Apical: WNL    Eccentric lengthening contraction: wnl  Bearing down Valsalva maneuver (2-3x): no prolape    Internal Palpation: WNL       Assessment:   Improved coordination of pelvic floor muscles via diaphragmatic breathing , abdominal bracing and pelvic floor muscles retraining with addition of fascially connected mm contraction . Pt appeared to understand techniques to incorporate correct diaphragmatic breathing with kegel and hip/back mm in various planes of motion.   Goals progressing better than expected.       Goals:   Goals: (to be met in 10 visits)-progressing  1. Independent in HEP and progression with improved understanding of bladder/bowel health, bladder retraining and long-term management.    2. Patient will demonstrate improved bladder voiding habits to voiding every 2 hours without urinary leakage.  3. Patient will demonstrate improve PFM isolation, coordination and strength so she is able to reduce urinary urge and prevent leakage during ADL's.  4. Pt will have increased transverse abdominis muscle strength to 2/5 to assist with supporting pelvic floor muscles.   5. PFM contraction before increase intra-abdominal pressure.     Plan: Continue plan of care as pt tolerates with focus on pelvic floor muscles . Next visit:biofeedback   Date: 6/11/2024  TX#: 2/10 Date:             6/18/2024     TX#: 3/ Date:                 TX#: 4/ Date:                 TX#: 5/ Date:   Tx#: 6/   NM/TE  Bladder diary  Bladder fitness+handout  Bladder/bowel voiding/strategies  Constipation/squatty potty strategies  Mere/phys PFM/fascially connected mm/diaphragm/Transverse abdominis    PNS activation    diaphragmatic breathing   abdominal bracing  Kegel     NM/TE  Bladder fitness  Bladder/bowel voiding/strategies-reviewed  Constipation/squatty potty strategies-reviewed  Mere/phys PFM/fascially connected mm/diaphragm/Transverse abdominis    PNS activation    diaphragmatic breathing   abdominal bracing  Kegel    Kegel  +  iso hip add  10\" x10    Kegel + articulating bridge x10 + iso hip add    Kegel +  Resisted   ER RTB  x10     Kegel + clamshell RTB x10 R/L     Kegel +  U/LE lift  x10     Kegel with plie/ER resisted RTB x10     HEP issued with RTB for above exs    Standing-  Kegel tonic, phasic    Sitting on ball-  Kegel  Tonic, phasic   Pressure management breath     biofeedback      HEP: Kegel 10 repetitions 3x/day, 10 sec on/10 sec off, 2 sec on 2-4 sec off , diaphragmatic breathing x10, abdominal bracing x10, + additions above     Charges: NMR 15 TE2 30       Total Timed Treatment: 45 min  Total Treatment Time: 45 min

## 2024-06-21 ENCOUNTER — APPOINTMENT (OUTPATIENT)
Dept: PHYSICAL THERAPY | Facility: HOSPITAL | Age: 74
End: 2024-06-21
Attending: NURSE PRACTITIONER
Payer: MEDICARE

## 2024-06-25 ENCOUNTER — OFFICE VISIT (OUTPATIENT)
Dept: PHYSICAL THERAPY | Facility: HOSPITAL | Age: 74
End: 2024-06-25
Attending: NURSE PRACTITIONER

## 2024-06-25 PROCEDURE — 97110 THERAPEUTIC EXERCISES: CPT

## 2024-06-25 PROCEDURE — 97112 NEUROMUSCULAR REEDUCATION: CPT

## 2024-06-25 NOTE — PROGRESS NOTES
Diagnosis:   OAB (overactive bladder) (N32.81)         Referring Provider: Farzaneh Irene  Date of Evaluation:    6/3/2024    Precautions:  None     Next MD visit:   none scheduled  Date of Surgery: n/a   Insurance Primary/Secondary: SKYL[employer] [facility] / N/A     # Auth Visits: no auth, no limit            DISCHARGE SUMMARY  PT SEEN 4'X IN PHYSICAL THERAPY.     Subjective:   No leakage this past week. Stopped drinking drinks with artificial sweeteners.  Feels like she has improved coordination with breathing and kegel contraction.  Was sitting on lower toilet and understands better the position of squatting for bowel movement.  Taking benefiber every day. Drinking water and eating yogurt.   Pt reports that she feels confident to continue HEP independently.    PFDI-20: 7.3/300 (was:116.66/300)    Pain: 0/10      Objective: Tx flow sheet     6/25/2024   Biofeedback: Recruitment good, holding ability fair-tonic supine, good in sitting, good-phasic supine, derecruitment fair-tonic supine, good-phasic-supine, normal in sitting  baseline between contractions .0-3.5Ua, baseline resting average 1Ua (normal 2.0Ua)      URINARY HABITS  Types of symptoms:   stress incontinence-RESOLVED   urge incontinence-RESOLVED  Urinary Frequency: 6x  Episodes of Leakage: 0X (WAS: 0-2x/day)  Amount of leakage: NONE (WAS:minimal)  Nocturia: 0-1x  Hovering: NO (WAS:yes)      BOWEL HABITS  Types of symptoms: Constipation-IMPROVED, DRINKING MORE WATER AND EATING FRUITS AND VEGGIES   Frequency of bowel movements: 1x/day - every 3rd day-NOT ASKED  Stool consistency: Piatt Stool Scale: 1-4-NOT ASKED  Do you strain with defecation: MIN (WAS: Yes)       SEXUAL HEALTH STATUS  No pelvic pain      Range Of Motion  Lumbar AROM screen: wnl  LE AROM screen: grossly WNL     Strength (MMT) 5/5 ANGELA LE  Transverse Abdominis: 2/5 (WAS:1/5)    Flexibility Summary: WNL ANGELA LE     Informed consent for internal pelvic evaluation given: Yes    External Observation:    Voluntary contraction: present   Voluntary relaxation: present  Involuntary contraction: present  Involuntary relaxation: present    Mons pubis: WNL  Labia majora: WNL  Labia minora: WNL  Urethral meatus: WNL  Introitus: WNL  Perineal body: WNL      Internal Examination     Pelvic Floor Muscle strength: (PERF= Power/Endurance/Reps/Fast) MMT: 2/4/4/4-NOT TESTED INTERNALLY  Accessory Muscle Use: ABLE TO ISOLATE (WAS:gluteals, adductors, abdominals)    Tissue Laxity Test:  Anterior Wall: WNL  Posterior Wall: WNL  Apical: WNL    Eccentric lengthening contraction: wnl  Bearing down Valsalva maneuver (2-3x): no prolape    Internal Palpation: WNL       Assessment:   Pt has made significant improvement in physical therapy with improved bowel and bladder function, increased core strength, improved pelvic floor muscles contraction, relaxation, and coordination, and function with resolution leakage. Improved coordination of pelvic floor muscles via biofeedback diaphragmatic breathing , abdominal bracing and pelvic floor muscles retraining with addition of fascially connected mm contraction . Pt appeared to understand techniques to incorporate correct diaphragmatic breathing with kegel and hip/back mm in various planes of motion.  All goals met.  Pt motivated to continue HEP.    PFDI improved 109.36 points.      Goals:   Goals: (to be met in 10 visits)-  1. Independent in HEP and progression with improved understanding of bladder/bowel health, bladder retraining and long-term management.  -MET  2. Patient will demonstrate improved bladder voiding habits to voiding every 2 hours without urinary leakage.-MET  3. Patient will demonstrate improve PFM isolation, coordination and strength so she is able to reduce urinary urge and prevent leakage during ADL's.-MET  4. Pt will have increased transverse abdominis muscle strength to 2/5 to assist with supporting pelvic floor muscles. -MET  5. PFM contraction before increase  intra-abdominal pressure. -MET    Plan: Pt considered discharged from physical therapy.  Pt instructed to call clinic if he/she has any further questions and to continue home exercise program.   Date: 6/11/2024  TX#: 2/10 Date:             6/18/2024     TX#: 3/ Date:      6/25/2024            TX#: 4/   NM/TE  Bladder diary  Bladder fitness+handout  Bladder/bowel voiding/strategies  Constipation/squatty potty strategies  Mere/phys PFM/fascially connected mm/diaphragm/Transverse abdominis    PNS activation    diaphragmatic breathing   abdominal bracing  Kegel     NM/TE  Bladder fitness  Bladder/bowel voiding/strategies-reviewed  Constipation/squatty potty strategies-reviewed  Mere/phys PFM/fascially connected mm/diaphragm/Transverse abdominis    PNS activation    diaphragmatic breathing   abdominal bracing  Kegel    Kegel +  iso hip add  10\" x10    Kegel + articulating bridge x10 + iso hip add    Kegel +  Resisted   ER RTB  x10     Kegel + clamshell RTB x10 R/L     Kegel +  U/LE lift  x10     Kegel with plie/ER resisted RTB x10     HEP issued with RTB for above exs    Standing-  Kegel tonic, phasic    Sitting on ball-  Kegel  Tonic, phasic   Pressure management breath NM/TE    biofeedback -   external sensors were placed and leads were attached  Resting tone  Tonic  Phasic  [place]  With coordinated breathing    kegel +  *Sit  *March  *Stand  *sit<>stand     Biofeedback with constant interpretation of results.     PFDI scoring and interpretion with patient strategies to reduce impairments.     .Progress note     Reviewed HEP   HEP: Kegel 10 repetitions 3x/day, 10 sec on/10 sec off, 2 sec on 2-4 sec off , diaphragmatic breathing x10, abdominal bracing x10, + additions above     Charges: NMR2 30 TE 15     Total Timed Treatment: 45 min  Total Treatment Time: 45 min

## 2024-06-28 ENCOUNTER — APPOINTMENT (OUTPATIENT)
Dept: PHYSICAL THERAPY | Facility: HOSPITAL | Age: 74
End: 2024-06-28
Attending: NURSE PRACTITIONER
Payer: MEDICARE

## 2024-07-02 ENCOUNTER — APPOINTMENT (OUTPATIENT)
Dept: PHYSICAL THERAPY | Facility: HOSPITAL | Age: 74
End: 2024-07-02
Attending: NURSE PRACTITIONER
Payer: MEDICARE

## 2024-07-05 ENCOUNTER — TELEPHONE (OUTPATIENT)
Dept: INTERNAL MEDICINE CLINIC | Facility: CLINIC | Age: 74
End: 2024-07-05

## 2024-07-05 ENCOUNTER — APPOINTMENT (OUTPATIENT)
Dept: PHYSICAL THERAPY | Facility: HOSPITAL | Age: 74
End: 2024-07-05
Attending: NURSE PRACTITIONER
Payer: MEDICARE

## 2024-07-09 ENCOUNTER — APPOINTMENT (OUTPATIENT)
Dept: PHYSICAL THERAPY | Facility: HOSPITAL | Age: 74
End: 2024-07-09
Attending: NURSE PRACTITIONER
Payer: MEDICARE

## 2024-07-12 ENCOUNTER — APPOINTMENT (OUTPATIENT)
Dept: PHYSICAL THERAPY | Facility: HOSPITAL | Age: 74
End: 2024-07-12
Attending: NURSE PRACTITIONER
Payer: MEDICARE

## 2024-07-16 ENCOUNTER — APPOINTMENT (OUTPATIENT)
Dept: PHYSICAL THERAPY | Facility: HOSPITAL | Age: 74
End: 2024-07-16
Attending: NURSE PRACTITIONER
Payer: MEDICARE

## 2024-07-23 ENCOUNTER — APPOINTMENT (OUTPATIENT)
Dept: PHYSICAL THERAPY | Facility: HOSPITAL | Age: 74
End: 2024-07-23
Attending: NURSE PRACTITIONER
Payer: MEDICARE

## 2024-07-30 ENCOUNTER — APPOINTMENT (OUTPATIENT)
Dept: PHYSICAL THERAPY | Facility: HOSPITAL | Age: 74
End: 2024-07-30
Attending: NURSE PRACTITIONER
Payer: MEDICARE

## 2024-08-06 ENCOUNTER — APPOINTMENT (OUTPATIENT)
Dept: PHYSICAL THERAPY | Facility: HOSPITAL | Age: 74
End: 2024-08-06
Attending: NURSE PRACTITIONER
Payer: MEDICARE

## 2024-08-13 ENCOUNTER — APPOINTMENT (OUTPATIENT)
Dept: PHYSICAL THERAPY | Facility: HOSPITAL | Age: 74
End: 2024-08-13
Attending: NURSE PRACTITIONER
Payer: MEDICARE

## 2024-10-14 RX ORDER — TOLTERODINE 2 MG/1
2 CAPSULE, EXTENDED RELEASE ORAL DAILY
Qty: 90 CAPSULE | Refills: 3 | Status: SHIPPED | OUTPATIENT
Start: 2024-10-14

## 2024-10-14 NOTE — TELEPHONE ENCOUNTER
Refill Per Protocol     Requested Prescriptions   Pending Prescriptions Disp Refills    TOLTERODINE ER 2 MG Oral Capsule SR 24 Hr [Pharmacy Med Name: TOLTERODINE  CAP 2MG ER] 90 capsule 3     Sig: TAKE 1 CAPSULE DAILY       Genitourinary Medications Passed - 10/14/2024  3:59 PM        Passed - Patient does not have pulmonary hypertension on problem list        Passed - In person appointment or virtual visit in the past 12 mos or appointment in next 3 mos     Recent Outpatient Visits              3 months ago     [facility] Hoa Porter, PT    Office Visit    3 months ago     [facility] Hoa Porter, PT    Office Visit    4 months ago     [facility] Hoa Porter, PT    Office Visit    4 months ago OAB (overactive bladder)    [facility] Hoa Maradiaga, PT    Office Visit    5 months ago [facility]    [facility] Visit                               Recent Outpatient Visits              3 months ago     [facility] Hoa Porter, PT    Office Visit    3 months ago     [facility] Hoa Porter, PT    Office Visit    4 months ago     [facility] Hoa Porter, PT    Office Visit    4 months ago OAB (overactive bladder)    [facility] Hoa Porter, PT    Office Visit    5 months ago [facility] mamm[facility]    [facility] Visit

## 2025-01-16 ENCOUNTER — OFFICE VISIT (OUTPATIENT)
Dept: INTERNAL MEDICINE CLINIC | Facility: CLINIC | Age: 75
End: 2025-01-16
Payer: MEDICARE

## 2025-01-16 ENCOUNTER — NURSE TRIAGE (OUTPATIENT)
Dept: INTERNAL MEDICINE CLINIC | Facility: CLINIC | Age: 75
End: 2025-01-16

## 2025-01-16 VITALS
BODY MASS INDEX: 28.84 KG/M2 | WEIGHT: 171 LBS | SYSTOLIC BLOOD PRESSURE: 130 MMHG | HEART RATE: 97 BPM | HEIGHT: 64.5 IN | DIASTOLIC BLOOD PRESSURE: 70 MMHG | OXYGEN SATURATION: 100 % | TEMPERATURE: 98 F | RESPIRATION RATE: 16 BRPM

## 2025-01-16 DIAGNOSIS — R05.1 ACUTE COUGH: ICD-10-CM

## 2025-01-16 DIAGNOSIS — B34.9 VIRAL ILLNESS: Primary | ICD-10-CM

## 2025-01-16 PROCEDURE — 99214 OFFICE O/P EST MOD 30 MIN: CPT

## 2025-01-16 PROCEDURE — 1159F MED LIST DOCD IN RCRD: CPT

## 2025-01-16 PROCEDURE — 1160F RVW MEDS BY RX/DR IN RCRD: CPT

## 2025-01-16 RX ORDER — CODEINE PHOSPHATE AND GUAIFENESIN 10; 100 MG/5ML; MG/5ML
5 SOLUTION ORAL NIGHTLY PRN
Qty: 120 ML | Refills: 0 | Status: SHIPPED | OUTPATIENT
Start: 2025-01-16

## 2025-01-16 RX ORDER — BENZONATATE 100 MG/1
100 CAPSULE ORAL 3 TIMES DAILY PRN
Qty: 21 CAPSULE | Refills: 0 | Status: SHIPPED | OUTPATIENT
Start: 2025-01-16 | End: 2025-01-23

## 2025-01-16 NOTE — TELEPHONE ENCOUNTER
Action Requested: Summary for Provider     []  Critical Lab, Recommendations Needed  [] Need Additional Advice  []   FYI    []   Need Orders  [] Need Medications Sent to Pharmacy  []  Other     SUMMARY: Patient reports sick symptoms x 2 days. Chills, dry cough, worse at night, coughing spells during the day, runny nose, nasal congestion, sore throat, head pressure, minor body aches, temp of 99. Patient is drinking fluids, advil, claritin D, cough syrup at night. Negative home Covid test. Appointment scheduled today for patient. She is agreeable to plan.     Reason for call: Cough/URI  Onset: 2 days       Reason for Disposition   Patient wants to be seen    Protocols used: Cough-A-OH

## 2025-01-16 NOTE — PROGRESS NOTES
Annie Vogel is a 74 year old female.   Chief Complaint   Patient presents with    Cough     Yb rm 16 A Cold symptoms started Tuesday covid test at home negative      HPI:    Patient here today reports sore throat, cough, chest aching, body aches. Did a covid test 2 days ago at home which was negative. She tried benadryl last night, robitussin during the day and took Claritin D with some improvement. No recent travel. She denies diarrhea. Tolerating PO. Some fatigue reported. Cough does interfere with her sleep. She remains afebrile.   Pmh: DM2, hypercholesterolemia, OAB  Allergies:  Allergies[1]   Current Meds:  Current Outpatient Medications   Medication Sig Dispense Refill    tolterodine ER 2 MG Oral Capsule SR 24 Hr Take 1 capsule (2 mg total) by mouth daily. 90 capsule 3    Ascorbic Acid (VITAMIN C) 1000 MG Oral Tab Take 1 tablet (1,000 mg total) by mouth daily.      JANUMET  MG Oral Tab TAKE 1 TABLET BY MOUTH  TWICE DAILY WITH MEALS IN  THE MORNING AND IN THE  EVENING 180 tablet 3    JARDIANCE 25 MG Oral Tab TAKE 1 TABLET BY MOUTH  DAILY 90 tablet 3    LISINOPRIL 5 MG Oral Tab TAKE 1 TABLET BY MOUTH  DAILY 90 tablet 3    SIMVASTATIN 20 MG Oral Tab TAKE 1 TABLET BY MOUTH AT  NIGHT 90 tablet 3    Blood Glucose Monitoring Suppl (ONETOUCH VERIO REFLECT) w/Device Does not apply Kit 1 each by Does not apply route 2 (two) times a day. 1 kit 0    Glucose Blood In Vitro Strip Test 2 times daily 200 each 1    B Complex Vitamins (VITAMIN B COMPLEX OR) Take by mouth daily.        aspirin 81 MG Oral Tab EC Take 1 tablet (81 mg total) by mouth daily.      Multiple Vitamins-Minerals (CENTRUM) Oral Tab Take 1 tablet by mouth daily.          PMH:     Past Medical History:    Atypical ductal hyperplasia of right breast    Calculus of kidney    Cataract    High blood pressure    High cholesterol    History of blood transfusion    Osteoarthritis    right shoulder    PONV (postoperative nausea and vomiting)    Type  II or unspecified type diabetes mellitus without mention of complication, not stated as uncontrolled    Visual impairment    glasses       ROS:   Review of Systems   Constitutional:  Positive for chills and fatigue. Negative for fever.   HENT:  Positive for congestion, ear pain, rhinorrhea, sinus pressure, sore throat and voice change. Negative for hearing loss, sinus pain, tinnitus and trouble swallowing.    Respiratory:  Positive for cough. Negative for chest tightness and shortness of breath.    Gastrointestinal: Negative.    Genitourinary: Negative.    Neurological: Negative.       PHYSICAL EXAM:    /70   Pulse 97   Temp 98 °F (36.7 °C) (Temporal)   Resp 16   Ht 5' 4.5\" (1.638 m)   Wt 171 lb (77.6 kg)   SpO2 100%   BMI 28.90 kg/m²   Physical Exam  Constitutional:       General: She is not in acute distress.     Appearance: Normal appearance. She is ill-appearing.   HENT:      Right Ear: Tympanic membrane normal.      Left Ear: Tympanic membrane normal.      Nose: Congestion and rhinorrhea present.      Mouth/Throat:      Pharynx: Posterior oropharyngeal erythema present.   Eyes:      Conjunctiva/sclera: Conjunctivae normal.   Cardiovascular:      Rate and Rhythm: Normal rate.   Pulmonary:      Effort: Pulmonary effort is normal. No respiratory distress.      Breath sounds: Normal breath sounds. No wheezing, rhonchi or rales.   Lymphadenopathy:      Cervical: No cervical adenopathy.   Skin:     General: Skin is warm and dry.   Neurological:      Mental Status: She is alert and oriented to person, place, and time.       ASSESSMENT/ PLAN:   1. Viral illness  Patient declined viral panel in office, she will repeat covid testing in 2 days and if positive will notify our office. Discussed hydration and medication management. Immune support vitamin D, zinc, elderberry, vitamin C.   - benzonatate 100 MG Oral Cap; Take 1 capsule (100 mg total) by mouth 3 (three) times daily as needed for cough.  Dispense: 21  capsule; Refill: 0  - guaiFENesin-codeine 100-10 MG/5ML Oral Solution; Take 5 mL by mouth nightly as needed for cough.  Dispense: 120 mL; Refill: 0    2. Acute cough  Benzonatate during day, robitussin AC nightly. Tylenol as needed for body aches, chills, fever.   - benzonatate 100 MG Oral Cap; Take 1 capsule (100 mg total) by mouth 3 (three) times daily as needed for cough.  Dispense: 21 capsule; Refill: 0  - guaiFENesin-codeine 100-10 MG/5ML Oral Solution; Take 5 mL by mouth nightly as needed for cough.  Dispense: 120 mL; Refill: 0    Call if symptoms fail to improve or worsen.   Health Maintenance Due   Topic Date Due    Diabetes Care A1C  10/07/2023    Diabetes Care: GFR  08/07/2024    COVID-19 Vaccine (8 - 2024-25 season) 09/01/2024    Influenza Vaccine (1) 10/01/2024    Colorectal Cancer Screening  12/08/2024    Annual Well Visit  01/01/2025    Annual Depression Screening  01/01/2025    Fall Risk Screening (Annual)  01/01/2025    Diabetes Care: Foot Exam (Annual)  01/01/2025    Diabetes Care: Microalb/Creat Ratio (Annual)  01/01/2025     Pt indicates understanding and agrees to the plan.     No follow-ups on file.    JAYCOB Sheridan          [1] No Known Allergies

## 2025-02-06 ENCOUNTER — NURSE TRIAGE (OUTPATIENT)
Dept: INTERNAL MEDICINE CLINIC | Facility: CLINIC | Age: 75
End: 2025-02-06

## 2025-02-07 ENCOUNTER — OFFICE VISIT (OUTPATIENT)
Dept: INTERNAL MEDICINE CLINIC | Facility: CLINIC | Age: 75
End: 2025-02-07
Payer: MEDICARE

## 2025-02-07 VITALS
DIASTOLIC BLOOD PRESSURE: 68 MMHG | WEIGHT: 169.81 LBS | HEART RATE: 92 BPM | HEIGHT: 64.5 IN | TEMPERATURE: 97 F | BODY MASS INDEX: 28.64 KG/M2 | SYSTOLIC BLOOD PRESSURE: 124 MMHG | RESPIRATION RATE: 16 BRPM | OXYGEN SATURATION: 97 %

## 2025-02-07 DIAGNOSIS — N39.3 URINE, INCONTINENCE, STRESS FEMALE: ICD-10-CM

## 2025-02-07 DIAGNOSIS — R05.2 SUBACUTE COUGH: Primary | ICD-10-CM

## 2025-02-07 PROCEDURE — 1160F RVW MEDS BY RX/DR IN RCRD: CPT

## 2025-02-07 PROCEDURE — 99214 OFFICE O/P EST MOD 30 MIN: CPT

## 2025-02-07 PROCEDURE — 1159F MED LIST DOCD IN RCRD: CPT

## 2025-02-07 RX ORDER — BENZONATATE 100 MG/1
100 CAPSULE ORAL 3 TIMES DAILY PRN
Qty: 21 CAPSULE | Refills: 0 | Status: SHIPPED | OUTPATIENT
Start: 2025-02-07 | End: 2025-02-14

## 2025-02-07 RX ORDER — AZITHROMYCIN 250 MG/1
TABLET, FILM COATED ORAL
Qty: 6 TABLET | Refills: 0 | Status: SHIPPED | OUTPATIENT
Start: 2025-02-07 | End: 2025-02-12

## 2025-02-07 NOTE — PROGRESS NOTES
Annie Vogel is a 74 year old female.   Chief Complaint   Patient presents with    Cough     EJ Rm 16a- Pt is here for a cough a really bad cough, pt stated the cough has cause her to have accident on herself     HPI:    Patient here today for follow up on cough over last 3-4 weeks. She was seen here almost a month ago and given benzonatate and robitussin AC. She reports medication did help cough but now when she is coughing she has urinary incontinence. She is afebrile. No weakness. Tolerating PO. No fatigue, sob or cp.     Allergies:  Allergies[1]   Current Meds:  Current Outpatient Medications   Medication Sig Dispense Refill    guaiFENesin-codeine 100-10 MG/5ML Oral Solution Take 5 mL by mouth nightly as needed for cough. 120 mL 0    tolterodine ER 2 MG Oral Capsule SR 24 Hr Take 1 capsule (2 mg total) by mouth daily. 90 capsule 3    Ascorbic Acid (VITAMIN C) 1000 MG Oral Tab Take 1 tablet (1,000 mg total) by mouth daily.      JANUMET  MG Oral Tab TAKE 1 TABLET BY MOUTH  TWICE DAILY WITH MEALS IN  THE MORNING AND IN THE  EVENING 180 tablet 3    JARDIANCE 25 MG Oral Tab TAKE 1 TABLET BY MOUTH  DAILY 90 tablet 3    LISINOPRIL 5 MG Oral Tab TAKE 1 TABLET BY MOUTH  DAILY 90 tablet 3    SIMVASTATIN 20 MG Oral Tab TAKE 1 TABLET BY MOUTH AT  NIGHT 90 tablet 3    Blood Glucose Monitoring Suppl (ONETOUCH VERIO REFLECT) w/Device Does not apply Kit 1 each by Does not apply route 2 (two) times a day. 1 kit 0    Glucose Blood In Vitro Strip Test 2 times daily 200 each 1    B Complex Vitamins (VITAMIN B COMPLEX OR) Take by mouth daily.        aspirin 81 MG Oral Tab EC Take 1 tablet (81 mg total) by mouth daily.      Multiple Vitamins-Minerals (CENTRUM) Oral Tab Take 1 tablet by mouth daily.          PMH:     Past Medical History:    Atypical ductal hyperplasia of right breast    Calculus of kidney    Cataract    High blood pressure    High cholesterol    History of blood transfusion    Osteoarthritis    right  shoulder    PONV (postoperative nausea and vomiting)    Type II or unspecified type diabetes mellitus without mention of complication, not stated as uncontrolled    Visual impairment    glasses       ROS:   Review of Systems   Constitutional: Negative.    Respiratory:  Positive for cough. Negative for chest tightness, shortness of breath and wheezing.    Cardiovascular: Negative.    Neurological: Negative.             PHYSICAL EXAM:    /68   Pulse 92   Temp 96.5 °F (35.8 °C) (Temporal)   Resp 16   Ht 5' 4.5\" (1.638 m)   Wt 169 lb 12.8 oz (77 kg)   SpO2 97%   BMI 28.70 kg/m²   Physical Exam  Constitutional:       Appearance: Normal appearance.   HENT:      Nose: Nose normal.   Cardiovascular:      Pulses: Normal pulses.   Pulmonary:      Effort: Pulmonary effort is normal.      Breath sounds: Normal breath sounds.   Lymphadenopathy:      Cervical: No cervical adenopathy.   Skin:     General: Skin is warm and dry.   Neurological:      Mental Status: She is alert.        ASSESSMENT/ PLAN:   1. Subacute cough  Zpak sent to pharmacy, refilled benzonatate. If symptoms fail to improve imaging would be ordered.     2. Urine, incontinence, stress female  Discussed pelvic floor exercises. She has completed PT in past and was helpful, reconsider if symptoms fail to improve.       Health Maintenance Due   Topic Date Due    Diabetes Care A1C  10/07/2023    Diabetes Care: GFR  08/07/2024    COVID-19 Vaccine (9 - 2024-25 season) 09/01/2024    Influenza Vaccine (1) 10/01/2024    Colorectal Cancer Screening  12/08/2024    Annual Well Visit  01/01/2025    Annual Depression Screening  01/01/2025    Fall Risk Screening (Annual)  01/01/2025    Diabetes Care: Foot Exam (Annual)  01/01/2025    Diabetes Care: Microalb/Creat Ratio (Annual)  01/01/2025           Pt indicates understanding and agrees to the plan.     No follow-ups on file.    JAYCOB Sheridan          [1] No Known Allergies

## 2025-02-27 ENCOUNTER — TELEPHONE (OUTPATIENT)
Dept: INTERNAL MEDICINE CLINIC | Facility: CLINIC | Age: 75
End: 2025-02-27

## 2025-02-27 DIAGNOSIS — R92.8 ABNORMAL MAMMOGRAM: Primary | ICD-10-CM

## 2025-02-27 NOTE — TELEPHONE ENCOUNTER
Orders to  Edward           Pt aware to get labs done no sooner than 2 weeks prior to the appt.  Pt aware to fast.  No call back required.    Future Appointments   Date Time Provider Department Center   4/8/2025  7:30 AM Farzaneh Irene APRN [facility]

## 2025-03-04 ENCOUNTER — NURSE TRIAGE (OUTPATIENT)
Dept: INTERNAL MEDICINE CLINIC | Facility: CLINIC | Age: 75
End: 2025-03-04

## 2025-03-04 NOTE — TELEPHONE ENCOUNTER
Action Requested: Summary for Provider     []  Critical Lab, Recommendations Needed  [] Need Additional Advice  []   FYI    []   Need Orders  [] Need Medications Sent to Pharmacy  []  Other     SUMMARY: Patient called reporting that she has a dry cough since yesterday. Denies shortness of breath, runny nose, throat pain or fever. States that she has tessalon pearls in the past and they have helped and would like a refill. Informed patient that symptoms would be appropriate to be managed at home. Informed patient regarding OTC medication and honey with warm liquids. Informed on symptoms to call back if patient gets worse or to go to ER if she develops shortness of breath. Patient verbalizes understanding.     Reason for call: Cough  Onset: 3/3/25                     Reason for Disposition   Cough with no complications    Protocols used: Cough-A-OH

## 2025-03-06 ENCOUNTER — TELEPHONE (OUTPATIENT)
Dept: INTERNAL MEDICINE CLINIC | Facility: CLINIC | Age: 75
End: 2025-03-06

## 2025-03-06 RX ORDER — BENZONATATE 100 MG/1
100 CAPSULE ORAL 3 TIMES DAILY PRN
Qty: 15 CAPSULE | Refills: 0 | Status: SHIPPED | OUTPATIENT
Start: 2025-03-06 | End: 2025-03-10

## 2025-03-06 NOTE — TELEPHONE ENCOUNTER
Patient was scheduled today for follow up for chronic cough, appointment was cancelled as provider out of office.   Patient rescheduled to Monday, requesting refill of Tessalon Perles to get her through weekend. Rx is already pending.

## 2025-03-07 RX ORDER — BENZONATATE 100 MG/1
100 CAPSULE ORAL
Qty: 21 CAPSULE | Refills: 0 | OUTPATIENT
Start: 2025-03-07

## 2025-03-10 ENCOUNTER — OFFICE VISIT (OUTPATIENT)
Dept: INTERNAL MEDICINE CLINIC | Facility: CLINIC | Age: 75
End: 2025-03-10
Payer: MEDICARE

## 2025-03-10 VITALS
HEART RATE: 80 BPM | WEIGHT: 161 LBS | HEIGHT: 64.5 IN | BODY MASS INDEX: 27.15 KG/M2 | RESPIRATION RATE: 16 BRPM | OXYGEN SATURATION: 96 % | SYSTOLIC BLOOD PRESSURE: 112 MMHG | TEMPERATURE: 98 F | DIASTOLIC BLOOD PRESSURE: 66 MMHG

## 2025-03-10 DIAGNOSIS — J01.90 SUBACUTE SINUSITIS, UNSPECIFIED LOCATION: Primary | ICD-10-CM

## 2025-03-10 PROCEDURE — 1160F RVW MEDS BY RX/DR IN RCRD: CPT

## 2025-03-10 PROCEDURE — 99213 OFFICE O/P EST LOW 20 MIN: CPT

## 2025-03-10 PROCEDURE — G2211 COMPLEX E/M VISIT ADD ON: HCPCS

## 2025-03-10 PROCEDURE — 1159F MED LIST DOCD IN RCRD: CPT

## 2025-03-10 RX ORDER — SPIRONOLACTONE 25 MG
TABLET ORAL
COMMUNITY
Start: 2025-02-03

## 2025-03-10 RX ORDER — BENZONATATE 100 MG/1
100 CAPSULE ORAL 3 TIMES DAILY PRN
Qty: 21 CAPSULE | Refills: 0 | Status: SHIPPED | OUTPATIENT
Start: 2025-03-10 | End: 2025-03-17

## 2025-03-10 NOTE — PROGRESS NOTES
Annie Vogel is a 74 year old female.   No chief complaint on file.    HPI:    Patient here today for follow up on cough. Prevsiously seen and given zpak with benzonatate which she reports symptoms improved on zpak but slowly returned once completed with antibiotic. She has been using claritin daily, benzonatate, with otc cough/cold syrup. No fever. +PND +sinus pain +persistent cough + voice changes. BP stable.     Allergies:  Allergies[1]   Current Meds:  Current Outpatient Medications   Medication Sig Dispense Refill    Calcium Carb-Cholecalciferol (CALCIUM 600/VITAMIN D) 600-10 MG-MCG Oral Chew Tab       benzonatate 100 MG Oral Cap Take 1 capsule (100 mg total) by mouth 3 (three) times daily as needed for cough. 15 capsule 0    tolterodine ER 2 MG Oral Capsule SR 24 Hr Take 1 capsule (2 mg total) by mouth daily. 90 capsule 3    Ascorbic Acid (VITAMIN C) 1000 MG Oral Tab Take 1 tablet (1,000 mg total) by mouth daily.      JANUMET  MG Oral Tab TAKE 1 TABLET BY MOUTH  TWICE DAILY WITH MEALS IN  THE MORNING AND IN THE  EVENING 180 tablet 3    JARDIANCE 25 MG Oral Tab TAKE 1 TABLET BY MOUTH  DAILY 90 tablet 3    LISINOPRIL 5 MG Oral Tab TAKE 1 TABLET BY MOUTH  DAILY 90 tablet 3    SIMVASTATIN 20 MG Oral Tab TAKE 1 TABLET BY MOUTH AT  NIGHT 90 tablet 3    Blood Glucose Monitoring Suppl (ONETOUCH VERIO REFLECT) w/Device Does not apply Kit 1 each by Does not apply route 2 (two) times a day. 1 kit 0    Glucose Blood In Vitro Strip Test 2 times daily 200 each 1    B Complex Vitamins (VITAMIN B COMPLEX OR) Take by mouth daily.        aspirin 81 MG Oral Tab EC Take 1 tablet (81 mg total) by mouth daily.      Multiple Vitamins-Minerals (CENTRUM) Oral Tab Take 1 tablet by mouth daily.      guaiFENesin-codeine 100-10 MG/5ML Oral Solution Take 5 mL by mouth nightly as needed for cough. (Patient not taking: Reported on 3/10/2025) 120 mL 0        PMH:     Past Medical History:    Atypical ductal hyperplasia of right  breast    Calculus of kidney    Cataract    High blood pressure    High cholesterol    History of blood transfusion    Osteoarthritis    right shoulder    PONV (postoperative nausea and vomiting)    Type II or unspecified type diabetes mellitus without mention of complication, not stated as uncontrolled    Visual impairment    glasses       ROS:   Review of Systems   Constitutional: Negative.    HENT:  Positive for congestion, postnasal drip, sinus pain and voice change.    Respiratory:  Positive for cough.    Cardiovascular: Negative.    Skin: Negative.    Neurological: Negative.       PHYSICAL EXAM:    Breastfeeding No   Physical Exam  Constitutional:       General: She is not in acute distress.     Appearance: Normal appearance. She is not ill-appearing.   HENT:      Right Ear: Tympanic membrane normal.      Left Ear: Tympanic membrane normal.      Nose: Congestion present.   Pulmonary:      Effort: Pulmonary effort is normal.      Breath sounds: Normal breath sounds.   Neurological:      Mental Status: She is alert.       ASSESSMENT/ PLAN:   1. Subacute sinusitis, unspecified location  Given response to zpak will send Augmentin BID x 10 days, benzonatate PRN, claritin and flonase discussed.   - amoxicillin clavulanate 875-125 MG Oral Tab; Take 1 tablet by mouth 2 (two) times daily for 10 days.  Dispense: 20 tablet; Refill: 0  - benzonatate 100 MG Oral Cap; Take 1 capsule (100 mg total) by mouth 3 (three) times daily as needed for cough.  Dispense: 21 capsule; Refill: 0      Health Maintenance Due   Topic Date Due    Diabetes Care A1C  10/07/2023    Diabetes Care: GFR  08/07/2024    COVID-19 Vaccine (9 - 2024-25 season) 09/01/2024    Influenza Vaccine (1) 10/01/2024    Colorectal Cancer Screening  12/08/2024    Annual Well Visit  01/01/2025    Diabetes Care: Foot Exam (Annual)  01/01/2025    Diabetes Care: Microalb/Creat Ratio (Annual)  01/01/2025    Mammogram  06/10/2025     Pt indicates understanding and agrees  to the plan.     Follow up as needed     JAYCOB Sheridan          [1] No Known Allergies

## 2025-03-24 ENCOUNTER — HOSPITAL ENCOUNTER (OUTPATIENT)
Dept: MAMMOGRAPHY | Facility: HOSPITAL | Age: 75
Discharge: HOME | End: 2025-03-24
Payer: MEDICARE

## 2025-03-24 DIAGNOSIS — R92.8 ABNORMAL MAMMOGRAM: ICD-10-CM

## 2025-03-24 PROCEDURE — 77066 DX MAMMO INCL CAD BI: CPT

## 2025-03-24 PROCEDURE — 77062 BREAST TOMOSYNTHESIS BI: CPT

## 2025-03-27 LAB
AMB EXT EGFR NON-AA: 97.4
AMB EXT HGBA1C: 6.9 %

## 2025-04-07 NOTE — PROGRESS NOTES
Subjective:   Annie Vogel is a 74 year old female who presents for a MA AHA (Medicare Advantage Annual Health Assessment) and Subsequent Annual Wellness visit (Pt already had Initial Annual Wellness) and scheduled follow up of multiple significant but stable problems.   History of Present Illness  Annie Vogel is a 74 year old female who presents for her annual Medicare wellness physical.    She is managing her diabetes with Janumet and Jardiance. Her last A1c was 6.9, down from 7.4  indicating improved glycemic control. She is trying to manage her A1c by drinking more water and doing yoga daily, which she notes has resulted in softer bowel movements. She admits to still consuming some sugar, particularly chocolate.  Dr Zhou    She is on simvastatin 20 mg for cholesterol management, with her last LDL recorded at 81 in August 2024.  Will have today.     She has a history of retina surgeries on the left eye and cataract surgery on the left eye, with a cataract present on the right eye. She follows up with Doctor Chung for retina issues and Doctor Reddy for cataract evaluation.    She has been experiencing discomfort in her left shoulder, similar to previous issues with her right shoulder. She recalls advice from physical therapy about posture and exercises but has not been doing the exercises regularly.    She notes some instability while walking, especially when walking her dogs, but denies any significant falls. She is involved in a balance class at [facility], which was discontinued, and is considering joining another session at [place].    She mentions a history of breast issues and is up to date with her mammogram, following with Dr Hutchins   Her last bone density test in 2018 was normal.    Her anxiety is well controlled without medication.    History/Other:   Fall Risk Assessment:   She has been screened for Falls and is low risk.      Cognitive Assessment:   She had a completely normal  cognitive assessment - see flowsheet entries       Functional Ability/Status:   Annie Vogel has some abnormal functions as listed below:  She has Vision problems based on screening of functional status.       Depression Screening (PHQ):  PHQ-2 SCORE: 0  , done 4/3/2025            Advanced Directives:   She does have a Living Will but we do NOT have it on file in Epic.    She does have a POA but we do NOT have it on file in Epic.    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    Current Medications:  Outpatient Medications Marked as Taking for the 4/8/25 encounter (Office Visit) with Farzaneh Irene APRN   Medication Sig    lisinopril 5 MG Oral Tab Take 1 tablet (5 mg total) by mouth daily.    simvastatin 20 MG Oral Tab Take 1 tablet (20 mg total) by mouth nightly.    tolterodine ER 2 MG Oral Capsule SR 24 Hr Take 1 capsule (2 mg total) by mouth daily.    Calcium Carb-Cholecalciferol (CALCIUM 600/VITAMIN D) 600-10 MG-MCG Oral Chew Tab     Ascorbic Acid (VITAMIN C) 1000 MG Oral Tab Take 1 tablet (1,000 mg total) by mouth daily.    JANUMET  MG Oral Tab TAKE 1 TABLET BY MOUTH  TWICE DAILY WITH MEALS IN  THE MORNING AND IN THE  EVENING    JARDIANCE 25 MG Oral Tab TAKE 1 TABLET BY MOUTH  DAILY    Blood Glucose Monitoring Suppl (ONETOUCH VERIO REFLECT) w/Device Does not apply Kit 1 each by Does not apply route 2 (two) times a day.    Glucose Blood In Vitro Strip Test 2 times daily    B  Complex Vitamins (VITAMIN B COMPLEX OR) Take by mouth daily.      aspirin 81 MG Oral Tab EC Take 1 tablet (81 mg total) by mouth daily.    Multiple Vitamins-Minerals (CENTRUM) Oral Tab Take 1 tablet by mouth daily.       Medical History:  She  has a past medical history of Atypical ductal hyperplasia of right breast (Nov. 2015), Calculus of kidney, Cataract, High blood pressure, High cholesterol, History of blood transfusion (1986), Osteoarthritis, PONV (postoperative nausea and vomiting), Type II or unspecified type diabetes mellitus without mention of complication, not stated as uncontrolled, and Visual impairment.  Surgical History:  She  has a past surgical history that includes breast surgery procedure unlisted (1/1/1969); cataract (1/3/2011); colonoscopy (1/1/2005); hysterectomy (1/1/1988); oophorectomy (10/1/2006); edgardo biopsy stereo nodule 2 site bilat (cpt=19081/13901) (2007); colonoscopy (N/A, 12/8/2014); Breast biopsy (Right, 11/10/15); needle biopsy right (10/2015); edgardo biopsy stereo nodule 1 site right (cpt=19081) (01/2018); edgardo localization wire 1 site right (cpt=19281) (11/2015); and edgardo localization wire 1 site left (cpt=19281) (1968/2007).   Family History:  Her family history includes Asthma in her son; Breast Cancer (age of onset: 79) in her mother; CABG in her father; Cancer in her mother; Diabetes in her father; Emphysema in her mother; Heart Disease in her father and maternal grandfather; Osteoporosis in her mother; Other in her father; Ovarian Cancer (age of onset: 79) in her mother; Renal Failure in her father; Thyroid Disorder in her sister.  Social History:  She  reports that she has never smoked. She has never used smokeless tobacco. She reports that she does not drink alcohol and does not use drugs.    Tobacco:  She has never smoked tobacco.    CAGE Alcohol Screen:   CAGE screening score of 0 on 4/3/2025, showing low risk of alcohol abuse.      Patient Care Team:  Oseas Holbrook MD as  PCP - General  Kamilah Zhou MD (ENDOCRINOLOGY)  Tam Hawthorne MD (GASTROENTEROLOGY)  Desiree Goldberg MD as Consulting Physician (NEUROLOGY)  Flora Jackson APRN as Breast Navigator (ONCOLOGY)  Nazario Chung MD as Consulting Physician (OPHTHALMOLOGY)  Farzaneh Irene APRN (Nurse Practitioner Acute Care)  Hoa Porter PT (Physical Therapy)  Mychart, Generic Provider  Hoa Porter PT as Physical Therapist (Physical Therapy)  Zeenat Wolfe APRN (Nurse Practitioner Family)    Review of Systems     Negative except as above     Objective:   Physical Exam  Physical Exam  General Appearance:  Alert, cooperative, no distress, appears stated age   Head:  Normocephalic, without obvious abnormality, atraumatic   Eyes:  PERRL, conjunctiva/corneas clear, EOM's intact both eyes   Ears:  Normal TM's and external ear canals, both ears   Nose: Nares normal, septum midline,mucosa normal, no drainage or sinus tenderness   Throat: Lips, mucosa, and tongue normal; teeth and gums normal   Neck: Supple, symmetrical, trachea midline, no adenopathy;  thyroid: not enlarged, symmetric, no JVD   Back:   Symmetric, no curvature, ROM normal, no CVA tenderness   Lungs:   Clear to auscultation bilaterally, respirations unlabored   Heart:  Regular rate and rhythm, S1 and S2 normal,    Abdomen:   Soft, non-tender, bowel sounds active all four quadrants,  no masses,    Extremities: Extremities normal, atraumatic, no edema   Pulses: symmetric   Skin: Skin color, texture, turgor normal,    Lymph nodes: Cervical, supraclavicular nodes normal   Neurologic: Normal        Bilateral barefoot skin diabetic exam is normal, visualized feet and the appearance is normal.  Bilateral monofilament/sensation of both feet is normal.  Pulsation pedal pulse exam of both lower legs/feet is normal as well.        Pulse 68   Temp 97.1 °F (36.2 °C)   Resp 16   Ht 5' 4.5\" (1.638 m)   Wt 167 lb 12.8 oz (76.1 kg)   SpO2 99%   BMI 28.36 kg/m²  Estimated body  mass index is 28.36 kg/m² as calculated from the following:    Height as of this encounter: 5' 4.5\" (1.638 m).    Weight as of this encounter: 167 lb 12.8 oz (76.1 kg).    Medicare Hearing Assessment:   Hearing Screening    Time taken: 4/8/2025  7:11 AM  Screening Method: Finger Rub  Finger Rub Result: Pass         Visual Acuity:   Right Eye Visual Acuity: Corrected Right Eye Chart Acuity: 20/25   Left Eye Visual Acuity: Corrected Left Eye Chart Acuity: 20/25   Both Eyes Visual Acuity: Corrected Both Eyes Chart Acuity: 20/25   Able To Tolerate Visual Acuity: Yes        Assessment & Plan:   Annie Vogel is a 74 year old female who presents for a Medicare Assessment.   Assessment & Plan  Type 2 diabetes mellitus without complication    Type 2 diabetes is well-managed with Janumet and Jardiance. Her recent A1c improved to 6.9 from 7.4. Continue Janumet and Jardiance. Monitor A1c every three months for Medicare coverage and effective management. Order a lipid panel.    Hyperlipidemia    She is on simvastatin 20 mg, with the last LDL at 81 in August 2024. Order a lipid panel to ensure Medicare coverage and avoid redundant testing. Continue simvastatin 20 mg.    Vitamin D deficiency    Vitamin D supplementation has resulted in a level of 61, indicating adequate control. Continue vitamin D supplementation.    Overactive bladder    She is on tolterodine for overactive bladder. Continue tolterodine.    Shoulder pain    Left shoulder pain, similar to previous right shoulder issues, likely due to inflammation or tendonitis. Inconsistent with exercises. Advise ibuprofen 200 mg three times a day for 3-5 days to reduce inflammation. Encourage shoulder exercises and physical therapy as needed to improve function.    Cataract and retinal issues    Cataract in the right eye and previous cataract surgery in the left eye. Two retinal surgeries on the left eye for macular issues, unrelated to diabetic retinopathy. Follow up with  a retina specialist in June. Monitor cataract in the right eye.    Preventive care    She is due for a colonoscopy, last done in December 2014. Administer a new pneumonia vaccine due to diabetes and the last vaccination in 2018. Schedule a colonoscopy with Dr. Hawthorne. Send a [employer] message after the colonoscopy to update records.      1. Routine general medical examination at a health care facility ([facility])  due for colonoscopy  will schedule with Dr Hawthorne and message me when complted.  PCV 20 today.   2. Type 2 diabetes mellitus without complication, without long-term current use of insulin (HCC)   managed by Dr Zhou  -     Lisinopril; Take 1 tablet (5 mg total) by mouth daily.  Dispense: 90 tablet; Refill: 3  -     Simvastatin; Take 1 tablet (20 mg total) by mouth nightly.  Dispense: 90 tablet; Refill: 3  3. Pure hypercholesterolemia  -     Lipid Panel; Future; Expected date: 04/08/2025  -     Lisinopril; Take 1 tablet (5 mg total) by mouth daily.  Dispense: 90 tablet; Refill: 3  -     Simvastatin; Take 1 tablet (20 mg total) by mouth nightly.  Dispense: 90 tablet; Refill: 3  4. Nodular thyroid disease  Managed by Dr Winnie mathews     5. Vitamin D deficiency  stable  cont supplement.    Overview:  9/24/18 labs  6. Anxiety  stable  montior.   7. 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    8. Microcalcification of right breast on mammogram  stable  managed by Dr Hutchins.    9. OAB (overactive bladder)  11. Osteopenia, unspecified location Continue weight bearing exercises and vit d supplement.    -     Lisinopril; Take 1 tablet (5 mg total) by mouth daily.  Dispense: 90 tablet; Refill: 3  -     Simvastatin; Take 1 tablet (20 mg total) by mouth nightly.  Dispense: 90 tablet; Refill: 3  12.  Hx cataract surgery left and monitoring cataract right.    13.  Osteopenia.  Continue weight bearing exercises and vit d supplement.        Other orders  -     Prevnar 20 (PCV20)  [97215]  -     Tolterodine Tartrate ER; Take 1 capsule (2 mg total) by mouth daily.  Dispense: 90 capsule; Refill: 3  Assessment & Plan    The patient indicates understanding of these issues and agrees to the plan.  Reinforced healthy diet, lifestyle, and exercise.      No follow-ups on file.     FarzanehJAYCOB Arenas, 4/7/2025     Supplementary Documentation:   General Health:  In the past six months, have you lost more than 10 pounds without trying?: (Patient-Rptd) 2 - No  Has your appetite been poor?: (Patient-Rptd) Yes  Type of Diet: (Patient-Rptd) Balanced  How does the patient maintain a good energy level?: (Patient-Rptd) Daily Walks  How would you describe your daily physical activity?: (Patient-Rptd) Moderate  How would you describe your current health state?: (Patient-Rptd) Good  How do you maintain positive mental well-being?: (Patient-Rptd) Social Interaction, Games, Visiting Friends, Visiting Family  On a scale of 0 to 10, with 0 being no pain and 10 being severe pain, what is your pain level?: (Patient-Rptd) 1 - (Mild)  In the past six months, have you experienced urine leakage?: (Patient-Rptd) 1-Yes  At any time do you feel concerned for the safety/well-being of yourself and/or your children, in your home or elsewhere?: (Patient-Rptd) No  Have you had any immunizations at another office such as Influenza, Hepatitis B, Tetanus, or Pneumococcal?: (Patient-Rptd) Yes    Health Maintenance   Topic Date Due    Diabetes Care A1C  10/07/2023    Diabetes Care: GFR  08/07/2024    COVID-19 Vaccine (9 - 2024-25 season) 09/01/2024    Colorectal Cancer Screening  12/08/2024    Annual Well Visit  01/01/2025    Diabetes Care: Foot Exam (Annual)  01/01/2025    Diabetes Care: Microalb/Creat Ratio (Annual)  01/01/2025    Diabetes Care Dilated Eye Exam  06/25/2025    Influenza Vaccine (Season Ended) 10/01/2025    Mammogram  03/24/2026    DEXA Scan  Completed    Annual Depression Screening  Completed    Fall Risk Screening  (Annual)  Completed    Pneumococcal Vaccine: 50+ Years  Completed    Zoster Vaccines  Completed    Meningococcal B Vaccine  Aged Out          The following individual(s) verbally consented to be recorded using ambient AI listening technology and understand that they can each withdraw their consent to this listening technology at any point by asking the clinician to turn off or pause the recording:    Patient name: Annie Vogel  Additional names:

## 2025-04-08 ENCOUNTER — OFFICE VISIT (OUTPATIENT)
Dept: INTERNAL MEDICINE CLINIC | Facility: CLINIC | Age: 75
End: 2025-04-08
Payer: MEDICARE

## 2025-04-08 ENCOUNTER — LAB ENCOUNTER (OUTPATIENT)
Dept: LAB | Age: 75
End: 2025-04-08
Attending: NURSE PRACTITIONER
Payer: MEDICARE

## 2025-04-08 VITALS
HEIGHT: 64.5 IN | HEART RATE: 68 BPM | TEMPERATURE: 97 F | RESPIRATION RATE: 16 BRPM | WEIGHT: 167.81 LBS | BODY MASS INDEX: 28.3 KG/M2 | OXYGEN SATURATION: 99 %

## 2025-04-08 DIAGNOSIS — M25.512 CHRONIC LEFT SHOULDER PAIN: ICD-10-CM

## 2025-04-08 DIAGNOSIS — N60.81 FLAT EPITHELIAL ATYPIA (FEA) OF RIGHT BREAST: ICD-10-CM

## 2025-04-08 DIAGNOSIS — R92.0 MICROCALCIFICATION OF RIGHT BREAST ON MAMMOGRAM: ICD-10-CM

## 2025-04-08 DIAGNOSIS — E11.9 TYPE 2 DIABETES MELLITUS WITHOUT COMPLICATION, WITHOUT LONG-TERM CURRENT USE OF INSULIN (HCC): ICD-10-CM

## 2025-04-08 DIAGNOSIS — E78.00 PURE HYPERCHOLESTEROLEMIA: ICD-10-CM

## 2025-04-08 DIAGNOSIS — Z00.00 ROUTINE GENERAL MEDICAL EXAMINATION AT A HEALTH CARE FACILITY: Primary | ICD-10-CM

## 2025-04-08 DIAGNOSIS — H25.9 SENILE CATARACT OF RIGHT EYE, UNSPECIFIED AGE-RELATED CATARACT TYPE: ICD-10-CM

## 2025-04-08 DIAGNOSIS — Z00.00 ROUTINE GENERAL MEDICAL EXAMINATION AT A HEALTH CARE FACILITY: ICD-10-CM

## 2025-04-08 DIAGNOSIS — N32.81 OAB (OVERACTIVE BLADDER): ICD-10-CM

## 2025-04-08 DIAGNOSIS — E04.1 NODULAR THYROID DISEASE: ICD-10-CM

## 2025-04-08 DIAGNOSIS — Z98.42 HISTORY OF LEFT CATARACT SURGERY: ICD-10-CM

## 2025-04-08 DIAGNOSIS — M85.80 OSTEOPENIA, UNSPECIFIED LOCATION: ICD-10-CM

## 2025-04-08 DIAGNOSIS — E55.9 VITAMIN D DEFICIENCY: ICD-10-CM

## 2025-04-08 DIAGNOSIS — F41.9 ANXIETY: ICD-10-CM

## 2025-04-08 DIAGNOSIS — G89.29 CHRONIC LEFT SHOULDER PAIN: ICD-10-CM

## 2025-04-08 LAB
CHOLEST SERPL-MCNC: 183 MG/DL (ref ?–200)
FASTING PATIENT LIPID ANSWER: YES
HDLC SERPL-MCNC: 81 MG/DL (ref 40–59)
LDLC SERPL CALC-MCNC: 89 MG/DL (ref ?–100)
NONHDLC SERPL-MCNC: 102 MG/DL (ref ?–130)
TRIGL SERPL-MCNC: 71 MG/DL (ref 30–149)
VLDLC SERPL CALC-MCNC: 11 MG/DL (ref 0–30)

## 2025-04-08 PROCEDURE — 80061 LIPID PANEL: CPT

## 2025-04-08 PROCEDURE — G0009 ADMIN PNEUMOCOCCAL VACCINE: HCPCS | Performed by: NURSE PRACTITIONER

## 2025-04-08 PROCEDURE — 1170F FXNL STATUS ASSESSED: CPT | Performed by: NURSE PRACTITIONER

## 2025-04-08 PROCEDURE — G0439 PPPS, SUBSEQ VISIT: HCPCS | Performed by: NURSE PRACTITIONER

## 2025-04-08 PROCEDURE — 96160 PT-FOCUSED HLTH RISK ASSMT: CPT | Performed by: NURSE PRACTITIONER

## 2025-04-08 PROCEDURE — 1125F AMNT PAIN NOTED PAIN PRSNT: CPT | Performed by: NURSE PRACTITIONER

## 2025-04-08 PROCEDURE — 1159F MED LIST DOCD IN RCRD: CPT | Performed by: NURSE PRACTITIONER

## 2025-04-08 PROCEDURE — 99214 OFFICE O/P EST MOD 30 MIN: CPT | Performed by: NURSE PRACTITIONER

## 2025-04-08 PROCEDURE — 99499 UNLISTED E&M SERVICE: CPT | Performed by: NURSE PRACTITIONER

## 2025-04-08 PROCEDURE — 1160F RVW MEDS BY RX/DR IN RCRD: CPT | Performed by: NURSE PRACTITIONER

## 2025-04-08 PROCEDURE — 36415 COLL VENOUS BLD VENIPUNCTURE: CPT

## 2025-04-08 PROCEDURE — 90677 PCV20 VACCINE IM: CPT | Performed by: NURSE PRACTITIONER

## 2025-04-08 RX ORDER — TOLTERODINE 2 MG/1
2 CAPSULE, EXTENDED RELEASE ORAL DAILY
Qty: 90 CAPSULE | Refills: 3 | Status: SHIPPED | OUTPATIENT
Start: 2025-04-08

## 2025-04-08 RX ORDER — SIMVASTATIN 20 MG
20 TABLET ORAL NIGHTLY
Qty: 90 TABLET | Refills: 3 | Status: SHIPPED | OUTPATIENT
Start: 2025-04-08 | End: 2025-04-08

## 2025-04-08 RX ORDER — LISINOPRIL 5 MG/1
5 TABLET ORAL DAILY
Qty: 90 TABLET | Refills: 3 | Status: SHIPPED | OUTPATIENT
Start: 2025-04-08 | End: 2025-04-08

## 2025-04-08 RX ORDER — LISINOPRIL 5 MG/1
5 TABLET ORAL DAILY
Qty: 90 TABLET | Refills: 3 | Status: SHIPPED | OUTPATIENT
Start: 2025-04-08

## 2025-04-08 RX ORDER — TOLTERODINE 2 MG/1
2 CAPSULE, EXTENDED RELEASE ORAL DAILY
Qty: 90 CAPSULE | Refills: 3 | Status: SHIPPED | OUTPATIENT
Start: 2025-04-08 | End: 2025-04-08

## 2025-04-08 RX ORDER — SIMVASTATIN 20 MG
20 TABLET ORAL NIGHTLY
Qty: 90 TABLET | Refills: 3 | Status: SHIPPED | OUTPATIENT
Start: 2025-04-08

## 2025-04-17 ENCOUNTER — HOSPITAL ENCOUNTER (OUTPATIENT)
Dept: GENERAL RADIOLOGY | Age: 75
Discharge: HOME | End: 2025-04-17
Payer: MEDICARE

## 2025-04-17 ENCOUNTER — OFFICE VISIT (OUTPATIENT)
Dept: INTERNAL MEDICINE CLINIC | Facility: CLINIC | Age: 75
End: 2025-04-17
Payer: MEDICARE

## 2025-04-17 ENCOUNTER — PATIENT MESSAGE (OUTPATIENT)
Dept: INTERNAL MEDICINE CLINIC | Facility: CLINIC | Age: 75
End: 2025-04-17

## 2025-04-17 ENCOUNTER — NURSE TRIAGE (OUTPATIENT)
Dept: INTERNAL MEDICINE CLINIC | Facility: CLINIC | Age: 75
End: 2025-04-17

## 2025-04-17 VITALS
HEIGHT: 64.5 IN | DIASTOLIC BLOOD PRESSURE: 60 MMHG | BODY MASS INDEX: 28.16 KG/M2 | RESPIRATION RATE: 16 BRPM | OXYGEN SATURATION: 98 % | WEIGHT: 167 LBS | HEART RATE: 80 BPM | SYSTOLIC BLOOD PRESSURE: 104 MMHG
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
## 2025-04-17 DIAGNOSIS — M79.89 HAND SWELLING: ICD-10-CM

## 2025-04-17 DIAGNOSIS — M79.89 HAND SWELLING: Primary | ICD-10-CM

## 2025-04-17 DIAGNOSIS — M25.532 LEFT WRIST PAIN: ICD-10-CM

## 2025-04-17 PROCEDURE — 99214 OFFICE O/P EST MOD 30 MIN: CPT

## 2025-04-17 PROCEDURE — 1159F MED LIST DOCD IN RCRD: CPT

## 2025-04-17 PROCEDURE — 73130 X-RAY EXAM OF HAND: CPT

## 2025-04-17 PROCEDURE — 73110 X-RAY EXAM OF WRIST: CPT

## 2025-04-17 PROCEDURE — 1125F AMNT PAIN NOTED PAIN PRSNT: CPT

## 2025-04-17 PROCEDURE — 1160F RVW MEDS BY RX/DR IN RCRD: CPT

## 2025-04-17 NOTE — PROGRESS NOTES
Annie Vogel is a 74 year old female.   Chief Complaint   Patient presents with    Fall     PT reports after falling on left hand after tripping on concrete sidewalk different in height. Pts hand is swollen and bruised.     HPI:      History of Present Illness  Annie Vogel is a 74-year-old female who presents with left hand and wrist pain following a fall.    She fell yesterday after tripping on uneven pavement while trying to quickly return to her car at a gas station. She landed on her left hand and wrist, which took the brunt of the fall. The pain is not sharp and is similar to when she broke her wrist 15-20 years ago. Swelling and bruising are present on the left hand and wrist.    She attempted to use her hand yesterday afternoon but experienced difficulty, such as when trying to open a jar of raspberry preserves, which resulted in the jar falling. She also finds it challenging to open door handles. No sharp pain is present, but there is soreness and tenderness in the wrist and hand area.    No head injuries, loss of consciousness, headaches, blurred vision, or double vision. She mentions a small skin tear on her knee from the fall but no significant knee pain.    Patient has wedding ring on which she was advised to remove and she placed in her purse due to swelling.      Allergies:  Allergies[1]   Current Meds:  Current Medications[2]     PMH:   Past Medical History[3]    ROS:   Review of Systems   Constitutional:  Positive for activity change. Negative for chills, fatigue and fever.   Respiratory: Negative.     Cardiovascular: Negative.    Skin:  Positive for color change (Bruising left hand and wrist).        PHYSICAL EXAM:    /60   Pulse 80   Resp 16   Ht 5' 4.5\" (1.638 m)   Wt 167 lb (75.8 kg)   SpO2 98%   BMI 28.22 kg/m²   Physical Exam  Constitutional:       Appearance: Normal appearance.   HENT:      Head: Normocephalic and atraumatic.   Cardiovascular:      Rate and Rhythm:  Normal rate.      Pulses: Normal pulses.   Pulmonary:      Effort: Pulmonary effort is normal.      Breath sounds: Normal breath sounds.   Musculoskeletal:         General: Swelling and tenderness present.   Skin:     Findings: Bruising (Left hand and wrist) present.   Neurological:      Mental Status: She is alert.          ASSESSMENT/ PLAN:     Assessment & Plan  Left wrist and hand pain:  Fall resulted in swelling and bruising. Possible fracture, unclear location. Previous wrist fracture noted.  - Ordered x-ray of left hand and wrist.  - Provide ACE bandage for compression.  - Instruct on icing and elevating hand.  - Advise Tylenol for pain.  - Schedule orthopedic follow-up if fracture confirmed, Dr. Case.        Health Maintenance Due   Topic Date Due    Diabetes Care A1C  10/07/2023    Diabetes Care: GFR  08/07/2024    COVID-19 Vaccine (9 - 2024-25 season) 09/01/2024    Colorectal Cancer Screening  12/08/2024    Diabetes Care Dilated Eye Exam  06/25/2025            The following individual(s) verbally consented to be recorded using ambient AI listening technology and understand that they can each withdraw their consent to this listening technology at any point by asking the clinician to turn off or pause the recording:    Patient name: Annie Vogel    Pt indicates understanding and agrees to the plan.     Follow up as needed.     JAYCOB Sheridan          [1] No Known Allergies  [2]   Current Outpatient Medications   Medication Sig Dispense Refill    lisinopril 5 MG Oral Tab Take 1 tablet (5 mg total) by mouth daily. 90 tablet 3    simvastatin 20 MG Oral Tab Take 1 tablet (20 mg total) by mouth nightly. 90 tablet 3    tolterodine ER 2 MG Oral Capsule SR 24 Hr Take 1 capsule (2 mg total) by mouth daily. 90 capsule 3    Calcium Carb-Cholecalciferol (CALCIUM 600/VITAMIN D) 600-10 MG-MCG Oral Chew Tab       Ascorbic Acid (VITAMIN C) 1000 MG Oral Tab Take 1 tablet (1,000 mg total) by mouth daily.       JANUMET  MG Oral Tab TAKE 1 TABLET BY MOUTH  TWICE DAILY WITH MEALS IN  THE MORNING AND IN THE  EVENING 180 tablet 3    JARDIANCE 25 MG Oral Tab TAKE 1 TABLET BY MOUTH  DAILY 90 tablet 3    Blood Glucose Monitoring Suppl (ONETOUCH VERIO REFLECT) w/Device Does not apply Kit 1 each by Does not apply route 2 (two) times a day. 1 kit 0    Glucose Blood In Vitro Strip Test 2 times daily 200 each 1    B Complex Vitamins (VITAMIN B COMPLEX OR) Take by mouth daily.        aspirin 81 MG Oral Tab EC Take 1 tablet (81 mg total) by mouth daily.      Multiple Vitamins-Minerals (CENTRUM) Oral Tab Take 1 tablet by mouth daily.     [3]   Past Medical History:   Atypical ductal hyperplasia of right breast    Calculus of kidney    Cataract    High blood pressure    High cholesterol    History of blood transfusion    Osteoarthritis    right shoulder    PONV (postoperative nausea and vomiting)    Type II or unspecified type diabetes mellitus without mention of complication, not stated as uncontrolled    Visual impairment    glasses

## 2025-04-17 NOTE — TELEPHONE ENCOUNTER
Action Requested: Summary for Provider     []  Critical Lab, Recommendations Needed  [] Need Additional Advice  []   FYI    []   Need Orders  [] Need Medications Sent to Pharmacy  []  Other     SUMMARY: Same day appt scheduled for hand bruising and swelling    Reason for call: Hand Injury  Onset: Yesterday    Pt reports she fell on an uneven sidewalk yesterday and landed on her left hand. C/o bruising, swelling, tenderness, and difficulty grasping things. Pt asking about xray. Offered same day appt with first available provider for evaluation. Pt agreed.    Reason for Disposition   Can't use injured hand normally (e.g., make a fist, open fully, hold a glass of water)    Protocols used: Hand and Wrist Injury-A-[place]

## 2025-04-18 ENCOUNTER — TELEPHONE (OUTPATIENT)
Facility: CLINIC | Age: 75
End: 2025-04-18

## 2025-04-18 NOTE — TELEPHONE ENCOUNTER
Patient called to schedule ER follow up for Left wrist fracture    Findings: Closed fracture of fifth metacarpal bone of left hand, unspecified fracture morphology     Please advise when pt can be seen.   Patient states please try her home and cell if she doesn't answer one.

## 2025-04-18 NOTE — TELEPHONE ENCOUNTER
Patient reached out for an update and understood that our office will call back with an appointment time and date.    Please advise.

## 2025-04-18 NOTE — TELEPHONE ENCOUNTER
Can you see this patient?  If so, when?  Please advise.  Thank you!        DOI  4/16/25    XR  4/17/25    Wrist  mpression   CONCLUSION:       1. Sequelae of chronic distal left radial fracture is noted.     2. Well ossified bone fragment adjacent to the ulnar styloid process that is new is also likely sequelae of chronic trauma.     3. Mild 1st carpometacarpal osteoarthritic changes.     4. No evidence of an acute fracture.      Hand    Impression   CONCLUSION:       There is an oblique fracture at the proximal base of the 5th metacarpal bone.  Mild angulation is noted.

## 2025-04-21 NOTE — TELEPHONE ENCOUNTER
Patient called to check the status of her message. Reached out to Dr. Case nurse, who was able to confirm Dr. Pulido can see this pt.     Scheduled for   Future Appointments   Date Time Provider Department Center   4/22/2025  9:00 AM Ramírez Pulido,  [facility] [address]   5/2/2025 11:30 AM Judith Hutchins MD [facility]

## 2025-04-22 ENCOUNTER — HOSPITAL ENCOUNTER (OUTPATIENT)
Dept: GENERAL RADIOLOGY | Age: 75
Discharge: HOME | End: 2025-04-22
Attending: FAMILY MEDICINE
Payer: MEDICARE

## 2025-04-22 ENCOUNTER — OFFICE VISIT (OUTPATIENT)
Facility: CLINIC | Age: 75
End: 2025-04-22
Payer: MEDICARE

## 2025-04-22 VITALS — WEIGHT: 167 LBS | HEIGHT: 64.5 IN | BODY MASS INDEX: 28.16 KG/M2

## 2025-04-22 DIAGNOSIS — S52.552A OTHER CLOSED EXTRA-ARTICULAR FRACTURE OF DISTAL END OF LEFT RADIUS, INITIAL ENCOUNTER: Primary | ICD-10-CM

## 2025-04-22 DIAGNOSIS — S62.347A CLOSED NONDISPLACED FRACTURE OF BASE OF FIFTH METACARPAL BONE OF LEFT HAND, INITIAL ENCOUNTER: ICD-10-CM

## 2025-04-22 DIAGNOSIS — M25.512 ACUTE PAIN OF LEFT SHOULDER: ICD-10-CM

## 2025-04-22 PROCEDURE — 73030 X-RAY EXAM OF SHOULDER: CPT | Performed by: FAMILY MEDICINE

## 2025-04-22 PROCEDURE — 1125F AMNT PAIN NOTED PAIN PRSNT: CPT | Performed by: FAMILY MEDICINE

## 2025-04-22 PROCEDURE — 1159F MED LIST DOCD IN RCRD: CPT | Performed by: FAMILY MEDICINE

## 2025-04-22 PROCEDURE — 1160F RVW MEDS BY RX/DR IN RCRD: CPT | Performed by: FAMILY MEDICINE

## 2025-04-22 PROCEDURE — 99204 OFFICE O/P NEW MOD 45 MIN: CPT | Performed by: FAMILY MEDICINE

## 2025-04-22 NOTE — H&P
Sports Medicine Clinic Note    Subjective:    Chief Complaint: Left hand and wrist pain    Date of Injury: 4/16/25    History: 74-year-old RHD female presents after falling on her outstretched left hand and wrist in a gas station parking lot. She reports sharp pain and significant bruising over the dorsal hand and wrist. Initially thought to have only a hand injury but was later informed of possible wrist involvement. Pain worsens with lifting or reaching movements, especially when putting on a coat or lifting objects like during recent [employer] events. She has been using an ACE wrap applied by her  and notes nighttime numbness and a sensation of her fingers sticking together. She has been taking Tylenol and Advil, finding better relief with Advil. She is concerned about her ability to manage caregiving duties for her medically complex spouse and her participation in [employer] activities.    Objective:    Left Hand and Wrist Examination:    Inspection: Ecchymosis and swelling over the ulnar aspect of the left hand and wrist.    Palpation: Tenderness to palpation over the base of the 5th metacarpal and distal wrist.    Range of Motion: Pain-limited range of motion in the wrist and hand; difficulty with active flexion and extension.    Neurovascular: Sensation intact to light touch; capillary refill <2 seconds; patient reports subjective nighttime numbness.    Special Tests: Deferred due to acute pain and swelling.    Diagnostic Tests:    Radiographs of the left hand demonstrate an oblique fracture at the proximal base of the 5th metacarpal with minimal angulation.    Radiographs of the left wrist show distal left radial fracture and a well-ossified fragment near the ulnar styloid, likely from prior trauma. Mild first CMC joint osteoarthritic changes are also noted.    Assessment:    Acute nondisplaced oblique fracture of the left 5th metacarpal base and left distal radius   Left wrist contusion with underlying  chronic osseous changes, clinically suspected acute exacerbation    Left shoulder pain, possible soft tissue injury or occult fracture    Plan:    Additional Workup: Order radiographs of the left shoulder to evaluate for possible fracture or soft tissue injury    Therapy: None initiated currently due to acute phase    Medications: Acetaminophen for pain preferred due to reduced bleeding risk; may use Advil intermittently if needed and no contraindications    Bracing/Casting: Exos short arm boxer's fracture cast applied to stabilize the hand and wrist, leaving three fingers free for functionality. Patient instructed to avoid weight-bearing for time being.  Procedures: None today    Activity Recommendations: Avoid lifting or gripping with the affected hand. Continue elevation and icing through the cast for swelling. Monitor for signs of increasing pain, numbness, or discoloration.    Follow-Up: Tentatively scheduled in 1-2 weeks with repeat radiographs of the left hand and wrist to evaluate fracture alignment and healing progress. Can review shoulder x-rays at that time.      Ramírez Pulido DO, CAQSM  Primary Care Sports Medicine

## 2025-04-23 ENCOUNTER — TELEPHONE (OUTPATIENT)
Dept: INTERNAL MEDICINE CLINIC | Facility: CLINIC | Age: 75
End: 2025-04-23

## 2025-04-23 NOTE — TELEPHONE ENCOUNTER
Fyi    Future Appointments   Date Time Provider Department Center   4/28/2025  1:00 PM Zeenat Wolfe, [facility] [address] [facility]   5/2/2025 11:30 AM Judith Hutchins MD [facility]   5/5/2025  8:20 AM Ramírez Pulido,  [facility] [address]

## 2025-04-23 NOTE — TELEPHONE ENCOUNTER
Recommend follow up when feeling better from hand fracture, to discuss osteoporosis testing/management.

## 2025-04-28 ENCOUNTER — OFFICE VISIT (OUTPATIENT)
Dept: INTERNAL MEDICINE CLINIC | Facility: CLINIC | Age: 75
End: 2025-04-28
Payer: MEDICARE

## 2025-04-28 VITALS
RESPIRATION RATE: 18 BRPM | OXYGEN SATURATION: 98 % | WEIGHT: 168 LBS | HEIGHT: 64.5 IN | DIASTOLIC BLOOD PRESSURE: 70 MMHG | HEART RATE: 92 BPM | TEMPERATURE: 98 F | SYSTOLIC BLOOD PRESSURE: 120 MMHG | BODY MASS INDEX: 28.33 KG/M2

## 2025-04-28 DIAGNOSIS — Z78.0 POST-MENOPAUSAL: ICD-10-CM

## 2025-04-28 DIAGNOSIS — M85.80 OSTEOPENIA, UNSPECIFIED LOCATION: Primary | ICD-10-CM

## 2025-04-28 PROCEDURE — 1159F MED LIST DOCD IN RCRD: CPT

## 2025-04-28 PROCEDURE — 1170F FXNL STATUS ASSESSED: CPT

## 2025-04-28 PROCEDURE — 1160F RVW MEDS BY RX/DR IN RCRD: CPT

## 2025-04-28 PROCEDURE — 99214 OFFICE O/P EST MOD 30 MIN: CPT

## 2025-04-28 NOTE — PROGRESS NOTES
Annie Vogel is a 74 year old female.   Chief Complaint   Patient presents with    Follow - Up     Follow up hand fracture.      HPI:      History of Present Illness  Annie Vogel is a 74 year old female with osteopenia and diabetes who is here for follow up after she sustained fracture of wrist and hand. She is following with [facility] for continued management.     She experienced wrist and shoulder pain following a fall while running. Initially, she did not realize her wrist was fractured and continued using it, including for activities such as serving communion. An x-ray initially showed no fracture, but a subsequent review by Dr. Pulido confirmed a wrist fracture. She is using a mitt to assist with showering and Equate washcloths for hygiene.     Regarding her shoulder, an x-ray showed no fracture, but she is unable to use it fully. There is concern for a possible rotator cuff issue, though no definitive diagnosis has been made.    She has a history of osteopenia and takes [employer] calcium, one tablet daily, and vitamin D3 gummies. She takes vitamin D gummy daily. Her last DEXA scan was in 2019, and she is due for a repeat scan.    Her diabetes management includes monitoring her A1c levels, which were last recorded as 6.9. She has a history of A1c levels reaching up to 7.4. she had recent A1c and cmp through [facility], needing to be updated in our system.     She has a history of retinal surgeries and is under the care of a retinal specialist, with an upcoming appointment at the end of May. Her diabetic eye exams are up to date, with the next one scheduled for June 4th. She is overdue for colon cancer screening and will schedule this soon.      Allergies:  Allergies[1]   Current Meds:  Current Medications[2]     PMH:   Past Medical History[3]    ROS:   Review of Systems   Constitutional: Negative.    Neurological: Negative.         PHYSICAL EXAM:    /70   Pulse 92   Temp 98.2 °F (36.8  °C) (Temporal)   Resp 18   Ht 5' 4.5\" (1.638 m)   Wt 168 lb (76.2 kg)   SpO2 98%   BMI 28.39 kg/m²   Physical Exam  Constitutional:       Appearance: Normal appearance. She is not ill-appearing or toxic-appearing.   Cardiovascular:      Rate and Rhythm: Normal rate.   Pulmonary:      Effort: Pulmonary effort is normal.   Skin:     General: Skin is warm and dry.   Neurological:      Mental Status: She is alert and oriented to person, place, and time.          ASSESSMENT/ PLAN:     Assessment & Plan  Fracture of wrist  Fracture confirmed by orthopedics despite initial imaging. Tenderness and limited mobility present. Anticipated healing time 6-8 weeks if no surgery needed.  - Follow up with orthopedic surgeon in two weeks to assess need for surgery.    Osteopenia  Osteopenia with concern for bone health due to recent fracture. Discussed calcium and vitamin D supplementation. Recommended DEXA scan to evaluate bone density and medication necessity.  - Order DEXA scan prior to endocrinologist appointment.  - Ensure calcium intake of 1200 mg daily and vitamin D intake of at least 1000 IU daily.    Diabetes mellitus  Recent A1c of 6.9% indicates suboptimal control, improved from 7.2% in 2023. Discussed importance of monitoring kidney function and stable glucose levels. Confirmed upcoming diabetic eye exam and retinal specialist appointment.  - Ensure A1c and GFR are up to date in medical record.  - Confirm upcoming diabetic eye exam and retinal specialist appointment.    Vitamin D deficiency  Vitamin D deficiency with lowest level of 19 ng/mL. Discussed importance of adequate levels, especially with osteopenia and recent fractures.   - Recommend vitamin D supplementation of 1000- 2000 IU daily.  - Check current vitamin D supplement dosage and adjust as necessary.       Health Maintenance Due   Topic Date Due    Diabetes Care A1C  10/07/2023    Diabetes Care: GFR  08/07/2024    COVID-19 Vaccine (9 - 2024-25 season)  09/01/2024    Colorectal Cancer Screening  12/08/2024    Diabetes Care Dilated Eye Exam  06/25/2025            The following individual(s) verbally consented to be recorded using ambient AI listening technology and understand that they can each withdraw their consent to this listening technology at any point by asking the clinician to turn off or pause the recording:    Patient name: Annie Vogel    Pt indicates understanding and agrees to the plan.     No follow-ups on file.    [facility] Sheridan          [1] No Known Allergies  [2]   Current Outpatient Medications   Medication Sig Dispense Refill    lisinopril 5 MG Oral Tab Take 1 tablet (5 mg total) by mouth daily. 90 tablet 3    simvastatin 20 MG Oral Tab Take 1 tablet (20 mg total) by mouth nightly. 90 tablet 3    tolterodine ER 2 MG Oral Capsule SR 24 Hr Take 1 capsule (2 mg total) by mouth daily. 90 capsule 3    Calcium Carb-Cholecalciferol (CALCIUM 600/VITAMIN D) 600-10 MG-MCG Oral Chew Tab       Ascorbic Acid (VITAMIN C) 1000 MG Oral Tab Take 1 tablet (1,000 mg total) by mouth daily.      JANUMET  MG Oral Tab TAKE 1 TABLET BY MOUTH  TWICE DAILY WITH MEALS IN  THE MORNING AND IN THE  EVENING 180 tablet 3    JARDIANCE 25 MG Oral Tab TAKE 1 TABLET BY MOUTH  DAILY 90 tablet 3    Blood Glucose Monitoring Suppl (ONETOUCH VERIO REFLECT) w/Device Does not apply Kit 1 each by Does not apply route 2 (two) times a day. 1 kit 0    Glucose Blood In Vitro Strip Test 2 times daily 200 each 1    B Complex Vitamins (VITAMIN B COMPLEX OR) Take by mouth daily.        aspirin 81 MG Oral Tab EC Take 1 tablet (81 mg total) by mouth daily.      Multiple Vitamins-Minerals (CENTRUM) Oral Tab Take 1 tablet by mouth daily.     [3]   Past Medical History:   Anxiety    Atypical ductal hyperplasia of right breast    Calculus of kidney    Cataract    High blood pressure    High cholesterol    History of blood transfusion    Obesity    Osteoarthritis    right shoulder     PONV (postoperative nausea and vomiting)    Type II or unspecified type diabetes mellitus without mention of complication, not stated as uncontrolled    Visual impairment    glasses

## 2025-05-02 ENCOUNTER — OFFICE VISIT (OUTPATIENT)
Dept: SURGERY | Facility: CLINIC | Age: 75
End: 2025-05-02
Payer: MEDICARE

## 2025-05-02 VITALS
OXYGEN SATURATION: 94 % | WEIGHT: 169 LBS | DIASTOLIC BLOOD PRESSURE: 63 MMHG | SYSTOLIC BLOOD PRESSURE: 132 MMHG | RESPIRATION RATE: 18 BRPM | BODY MASS INDEX: 29 KG/M2 | HEART RATE: 78 BPM

## 2025-05-02 DIAGNOSIS — R92.1 CALCIFICATION OF RIGHT BREAST: Primary | ICD-10-CM

## 2025-05-02 DIAGNOSIS — N60.81 FLAT EPITHELIAL ATYPIA (FEA) OF RIGHT BREAST: ICD-10-CM

## 2025-05-02 PROCEDURE — 1159F MED LIST DOCD IN RCRD: CPT | Performed by: SURGERY

## 2025-05-02 PROCEDURE — 1160F RVW MEDS BY RX/DR IN RCRD: CPT | Performed by: SURGERY

## 2025-05-02 PROCEDURE — 1126F AMNT PAIN NOTED NONE PRSNT: CPT | Performed by: SURGERY

## 2025-05-02 PROCEDURE — 99213 OFFICE O/P EST LOW 20 MIN: CPT | Performed by: SURGERY

## 2025-05-05 ENCOUNTER — HOSPITAL ENCOUNTER (OUTPATIENT)
Dept: GENERAL RADIOLOGY | Age: 75
Discharge: HOME | End: 2025-05-05
Attending: FAMILY MEDICINE
Payer: MEDICARE

## 2025-05-05 ENCOUNTER — OFFICE VISIT (OUTPATIENT)
Facility: CLINIC | Age: 75
End: 2025-05-05
Payer: MEDICARE

## 2025-05-05 VITALS — WEIGHT: 169 LBS | HEIGHT: 64.5 IN | BODY MASS INDEX: 28.5 KG/M2

## 2025-05-05 DIAGNOSIS — M25.532 LEFT WRIST PAIN: ICD-10-CM

## 2025-05-05 DIAGNOSIS — S62.347D CLOSED NONDISPLACED FRACTURE OF BASE OF FIFTH METACARPAL BONE OF LEFT HAND WITH ROUTINE HEALING, SUBSEQUENT ENCOUNTER: ICD-10-CM

## 2025-05-05 DIAGNOSIS — S46.912D STRAIN OF LEFT SHOULDER, SUBSEQUENT ENCOUNTER: ICD-10-CM

## 2025-05-05 DIAGNOSIS — M79.642 LEFT HAND PAIN: ICD-10-CM

## 2025-05-05 DIAGNOSIS — S52.552D OTHER CLOSED EXTRA-ARTICULAR FRACTURE OF DISTAL END OF LEFT RADIUS WITH ROUTINE HEALING, SUBSEQUENT ENCOUNTER: ICD-10-CM

## 2025-05-05 DIAGNOSIS — M25.532 LEFT WRIST PAIN: Primary | ICD-10-CM

## 2025-05-05 PROCEDURE — 73110 X-RAY EXAM OF WRIST: CPT | Performed by: FAMILY MEDICINE

## 2025-05-05 PROCEDURE — 1126F AMNT PAIN NOTED NONE PRSNT: CPT | Performed by: FAMILY MEDICINE

## 2025-05-05 PROCEDURE — 99214 OFFICE O/P EST MOD 30 MIN: CPT | Performed by: FAMILY MEDICINE

## 2025-05-05 PROCEDURE — 73130 X-RAY EXAM OF HAND: CPT | Performed by: FAMILY MEDICINE

## 2025-05-05 PROCEDURE — 1159F MED LIST DOCD IN RCRD: CPT | Performed by: FAMILY MEDICINE

## 2025-05-05 PROCEDURE — 1160F RVW MEDS BY RX/DR IN RCRD: CPT | Performed by: FAMILY MEDICINE

## 2025-05-05 RX ORDER — MELOXICAM 15 MG/1
15 TABLET ORAL DAILY
Qty: 30 TABLET | Refills: 0 | Status: SHIPPED | OUTPATIENT
Start: 2025-05-05 | End: 2025-06-04

## 2025-05-05 NOTE — PROGRESS NOTES
Sports Medicine Clinic Note    Subjective:    Chief Complaint: Left hand, wrist, and shoulder pain    Date of Injury: 4/16/25    History: 74-year-old right-hand dominant female presents for follow-up of left wrist and 5th metacarpal fractures. She has been wearing an Exos short arm cast continuously for the past two weeks. Reports persistent difficulty using her fingers for twisting tasks and experiences fatigue and soreness in the hand with use. Has been manually adjusting the cast, raising concerns for overuse. Denies removing the cast, including during imaging. Notes an odor emanating from the hand, presumed secondary to moisture under the cast. Reports intermittent use of Tylenol for pain; has avoided NSAIDs due to concerns about bruising. Also complains of persistent left shoulder pain with overhead and lateral movements.    Objective:    Left Hand and Wrist Examination:    Inspection: Cast in place. No visible swelling or skin abnormalities outside cast margins. Reports odor beneath the cast.  Palpation: Unable to assess fully due to cast; previous tenderness localized to the base of the 5th metacarpal and distal radius.  Range of Motion: Range of motion restricted by cast; patient reports fatigue and discomfort with hand use.  Neurovascular: Sensation grossly intact in exposed digits; capillary refill <2 seconds. No discoloration noted. Reports hand fatigue and subjective soreness with activity.    Left Shoulder Examination:    Inspection: No visible swelling or deformity.  Palpation: Mild tenderness over anterior and lateral deltoid region.  Range of Motion: Pain reported with active abduction and forward elevation beyond 90 degrees.  Neurovascular: No evidence of neuromuscular compromise.  Special Tests: No drop arm or lag signs.    Diagnostic Tests:    Repeat radiographs of the left wrist demonstrate an intra-articular fracture of the base of the 5th metacarpal with no significant interval change. Stable  osteoarthritic changes at the 1st carpometacarpal joint. No new fractures or dislocations.    Repeat radiographs of the left hand reveal a proximal 5th metacarpal fracture unchanged in alignment and position. Stable osteoarthritic changes noted at the 1st CMC and MCP joints.    Left shoulder x-rays showed no fracture or dislocation. Mild DJD with overall preserved joint spaces.    Assessment:    Acute nondisplaced fracture of left distal radius  Acute nondisplaced oblique fracture of left 5th metacarpal base  Left shoulder strain    Plan:    Therapy: Begin home-based range of motion exercises for the left shoulder; consider formal physical therapy if pain persists or worsens.  Medications: Continue acetaminophen as needed for pain. NSAIDs may be used intermittently if tolerable and no contraindications.  Bracing/Casting: Continue with Exos cast for an additional 2-3 weeks. Reinforce cast care and advise against self-adjustments.  Procedures: None performed today.  Activity Recommendations: Avoid activities that require twisting or gripping with the left hand. Limit overhead and lateral arm movements. Encourage gentle shoulder mobility exercises.    Follow-Up: Tentatively scheduled in 2 weeks with repeat x-rays of the left hand and wrist to reassess fracture healing and determine readiness for transition to bracing.      Ramírez Pulido DO, CAQSM   Primary Care Sports Medicine

## 2025-05-07 ENCOUNTER — TELEPHONE (OUTPATIENT)
Dept: INTERNAL MEDICINE CLINIC | Facility: CLINIC | Age: 75
End: 2025-05-07

## 2025-05-07 NOTE — TELEPHONE ENCOUNTER
In visit patient thought she completed DEXA recently and was going to look at her records. Last dexa 2019 ordered by Dr. Zhou. I do see she is scheduled with Dr. Zhou in a few weeks. Please remind her of Dexa ordered at last visit with me.

## 2025-05-12 ENCOUNTER — PATIENT MESSAGE (OUTPATIENT)
Dept: INTERNAL MEDICINE CLINIC | Facility: CLINIC | Age: 75
End: 2025-05-12

## 2025-05-30 DIAGNOSIS — S62.347D CLOSED NONDISPLACED FRACTURE OF BASE OF FIFTH METACARPAL BONE OF LEFT HAND WITH ROUTINE HEALING, SUBSEQUENT ENCOUNTER: Primary | ICD-10-CM

## 2025-05-30 DIAGNOSIS — M79.642 LEFT HAND PAIN: ICD-10-CM

## 2025-05-31 PROBLEM — R92.1 CALCIFICATION OF RIGHT BREAST: Status: ACTIVE | Noted: 2025-05-31

## 2025-05-31 NOTE — PROGRESS NOTES
Breast Surgery Follow-Up Visit     Diagnosis: ADH/FEA of the Right breast status post Right breast excisional biopsy with preoperative wire localization on November 10, 2015.      Stage: N/A     Disease Status:   Surgical therapy complete, chemoprevention declined, high risk surveillance ongoing.      History:   This 73 year old woman presented with imaging detected right breast ADH/FEA presents today for a surveillance visit.     She does have significant past history for breast diseases or breast biopsy including excision of a benign cyst from the Left breast in 1968 and a Left breast biopsy in 2007 which was benign. She does not have family history of breast cancer.      On September 29, 2015 she presented for Bilateral Screening Mammogram that showed scattered fibroglandular densities (25-50% glandular) with no changes on the Left but with new densities on the Right for which additional imaging was recommended. On October 7, 2015 her R diagnostic mammogram showed nodular asymmetry persists at the 6:00 position right breast with a 7mm poechoic nodule on US for which biopsy was recommended. US guided biopsy took place on October 13, 2015 and pathology confirmed ADH/FEA.      She underwent excision of the atypia without complication. She met with Dr. Brooke to discuss options for chemoprevention and she elected to forego secondary to concern for side effects. She denies any new breast related concerns today.  Most recent mammogram was a bilateral diagnostic mammogram on March 24, 2025 which showed calcifications in the right breast that appear to be likely benign.  She is here today for evaluation and recommendations for further therapy.        Past Medical History:    Anxiety    Atypical ductal hyperplasia of right breast    Calculus of kidney    Cataract    High blood pressure    High cholesterol    History of blood transfusion    Obesity    Osteoarthritis    right shoulder    PONV (postoperative nausea and  vomiting)    Type II or unspecified type diabetes mellitus without mention of complication, not stated as uncontrolled    Visual impairment    glasses       Past Surgical History:   Procedure Laterality Date    Breast biopsy Right 11/10/2015    excisional bx of right breast    Breast surgery procedure unlisted  1969    Biopsy breast open, LEFT NEGATIVE  MARKER PLACED IN BREAST    Cataract  2011    Cataract surgery, LEFT    Colonoscopy  2005    negative per patient    Colonoscopy N/A 2014    Procedure: COLONOSCOPY;  Surgeon: Tam Hawthorne MD;  Location:  ENDOSCOPY    Hysterectomy  1988    WITH SINGLE OOPHERECTOMY    Nelia biopsy stereo nodule 1 site right (cpt=19081)  2018    fibrocystic changes    Nelia biopsy stereo nodule 2 site bilat (cpt=19081/92747)      benign    Nelia localization wire 1 site left (cpt=19281)      benign/fibroadenoma    Nelia localization wire 1 site right (cpt=19281)  2015    ADH;FEA;Intraductal papilloma    Needle biopsy right  10/2015    atypia    Oophorectomy  10/01/2006    Other surgical history  retina surgery, breast       Gynecological History:  Pt is a   She achieved menarche at age 13   Age of Menopause: 30's  Type: Hysterectomy (Both ovaries now removed)  She has no history of hormone replacement therapy.  She has a history of oral contraceptive use for a few years, last in .  She has recieved infertility treatment to achieve pregnancy for over 6 years unsuccessfully.    Medications:     lisinopril 5 MG Oral Tab Take 1 tablet (5 mg total) by mouth daily. 90 tablet 3    simvastatin 20 MG Oral Tab Take 1 tablet (20 mg total) by mouth nightly. 90 tablet 3    tolterodine ER 2 MG Oral Capsule SR 24 Hr Take 1 capsule (2 mg total) by mouth daily. 90 capsule 3    Calcium Carb-Cholecalciferol (CALCIUM 600/VITAMIN D) 600-10 MG-MCG Oral Chew Tab       Ascorbic Acid (VITAMIN C) 1000 MG Oral Tab Take 1 tablet (1,000 mg total) by mouth in  the morning.      JANUMET  MG Oral Tab TAKE 1 TABLET BY MOUTH  TWICE DAILY WITH MEALS IN  THE MORNING AND IN THE  EVENING 180 tablet 3    JARDIANCE 25 MG Oral Tab TAKE 1 TABLET BY MOUTH  DAILY 90 tablet 3    Blood Glucose Monitoring Suppl (ONETOUCH VERIO REFLECT) w/Device Does not apply Kit 1 each by Does not apply route 2 (two) times a day. 1 kit 0    Glucose Blood In Vitro Strip Test 2 times daily 200 each 1    B Complex Vitamins (VITAMIN B COMPLEX OR) Take by mouth in the morning.      aspirin 81 MG Oral Tab EC Take 1 tablet (81 mg total) by mouth in the morning.      Multiple Vitamins-Minerals (CENTRUM) Oral Tab Take 1 tablet by mouth in the morning.         Allergies:    No Known Allergies    Family History:   Family History   Problem Relation Age of Onset    Other (Emphysema[other]) Mother     Other (Osteoporosis[other]) Mother     Cancer Mother         Ovarian cancer Breast cancer    Ovarian Cancer Mother 79    Breast Cancer Mother 79    Diabetes Father     Heart Disease Father         also diabetic    Other (Other[other]) Father     Other (CABG[other]) Father     Other (Renal Failure[other]) Father     Asthma Son         also Graves Disease    Heart Disease Maternal Grandfather     Thyroid Disorder Sister        She is not of Ashkenazi Cheondoism ancestry.    Social History:  History   Alcohol Use No       History   Smoking Status    Never   Smokeless Tobacco    Never       Review of Systems:  General:   The patient denies, fever, chills, night sweats, fatigue, generalized weakness, change in appetite or weight loss.    HEENT:     The patient denies eye irritation, cataracts, redness, glaucoma, yellowing of the eyes, change in vision, color blindness, or wearing contacts/glasses. The patient denies hearing loss, ringing in the ears, ear drainage, earaches, nasal congestion, nose bleeds, snoring, pain in mouth/throat, hoarseness, change in voice, facial trauma.    Respiratory:  The patient denies chronic  cough, phlegm, hemoptysis, pleurisy/chest pain, pneumonia, asthma, wheezing, difficulty in breathing with exertion, emphysema, chronic bronchitis, shortness of breath or abnormal sound when breathing.     Cardiovascular:  There is no history of chest pain, chest pressure/discomfort, palpitations, irregular heartbeat, fainting or near-fainting, difficulty breathing when lying flat, SOB/Coughing at night, swelling of the legs or chest pain while walking.    Breasts:  See history of present illness    Gastrointestinal:     There is no history of difficulty or pain with swallowing, reflux symptoms, vomiting, dark or bloody stools, constipation, yellowing of the skin, indigestion, nausea, change in bowel habits, diarrhea, abdominal pain or vomiting blood.     Genitourinary:  The patient denies frequent urination, needing to get up at night to urinate, urinary hesitancy or retaining urine, painful urination, urinary incontinence, decreased urine stream, blood in the urine or vaginal/penile discharge.    Skin:    The patient denies rash, itching, skin lesions, dry skin, change in skin color or change in moles.     Hematologic/Lymphatic:  The patient denies easily bruising or bleeding or persistent swollen glands or lymph nodes.     Musculoskeletal:  The patient denies muscle aches/pain, joint pain, stiff joints, neck pain, back pain or bone pain.    Neuropsychiatric:  There is no history of migraines or severe headaches, seizure/epilepsy, speech problems, coordination problems, trembling/tremors, fainting/black outs, dizziness, memory problems, loss of sensation/numbness, problems walking, weakness, tingling or burning in hands/feet. There is no history of abusive relationship, bipolar disorder, sleep disturbance, anxiety, depression or feeling of despair.    Endocrine:    There is no history of poor/slow wound healing, weight loss/gain, fertility or hormone problems, cold intolerance, thyroid disease.      Allergic/Immunologic:  There is no history of hives, hay fever, angioedema or anaphylaxis.    /63 (BP Location: Left arm, Patient Position: Sitting, Cuff Size: adult)   Pulse 78   Resp 18   Wt 76.7 kg (169 lb)   SpO2 94%   BMI 28.56 kg/m²     Physical Examination:  This is a well-nourished, alert and oriented woman. There is not palpable cervical, supraclavicular or axillary adenopathy.  The neck is supple with a midline trachea and no thyromegaly. Range of motion is good at both shoulders. Breasts are symmetrical. The tumorectomy site is in the Right breast and shows no evidence of local recurrence. Both nipples are everted with no discharge. There is not dominant masses, focal nodularity or skin changes on either side. The abdomen is soft, flat and nontender, with no palpable masses or organomegaly.      Risk Estimate:  Marianne:  Five Year is:  9.1%.  Marianne Lifetime is:  31.3%.  BRCA Pro Lifetime is:  7.4%  Tyrer-Cuzick Five Year is: 8.8%  Tyrer-Cuzick Lifetime is: 30.7%     Probability of BRCA 1 or 2 alteration is:  0.8% using BRCA PRO, and 0.3% using Tyrer-Cuzick, and 3% using Myriad Prevalence Tables.     Impression:   Ms. Annie Vogel is a 74 year old woman s/p excision of right breast ADH/FEA and h/o Left breast benign biopsies.     Discussion and Plan:  I had a discussion with the Patient regarding her breast exam. On exam today, she has no clinical evidence of malignancy bilaterally.  I reviewed her recent imaging and we discussed this at length.     I reviewed her breast cancer risk estimates to her and answered questions she had pertaining to her level of risk.  The patient's current five-year risk for breast cancer is elevated when compared to her peer group. We discussed factors that would further increase her risk for breast cancer over time to include age, future breast biopsy, as well as additional family members that may be diagnosed with breast cancer.        We then turned our  discussion towards genetic counseling and testing as her likelihood for carrying a mutation is Low.  Based on the findings, I would not recommend Annie Vogel for counseling and testing unless an additional family member develops a malignancy. We discussed the implications of carrying a genetic mutation as well as both surveillance programs and surgical risk-reducing surgery. She was given information regarding our cancer genetic program here at [facility].      With regard to risk-reducing interventions, we discussed lifestyle modifications including attention to diet, exercise, weight control, moderation in alcohol use, and avoidance of long-term hormone replacement therapy in the future.       We then touched upon the utility of chemoprevention. I reviewed that the FDA approved the use of Tamoxifen for breast cancer risk reduction in premenopausal women at increased risk for breast cancer based upon the results of the NSABP Breast Cancer Prevention Trial in 1998 and that several other agents have now been approved similarly for post-menopausal women. The initial criteria for inclusion included a 5 year risk of breast cancer greater than 1.67% based on the Marianne Model which in order to establish a favorable risk versus benefit profile. We reviewed Annie Vogel's risk, which is 9.1% over the next 5 years and therefore I do recommend chemoprophylaxis at this time, however, she declines this presently secondary to concern of side effects.      The patient was given ample opportunity for questions, and those questions were answered to her satisfaction.  She is due for bilateral diagnostic mammogram in March 2025 and that order was provided to her.  I will be in touch with her after her mammogram with her results.  She will return annually for clinical exam.  She has been encouraged to contact the office with any questions/concerns prior to her next appointment.    This encounter lasted a total of 25 min,  more than 50% of which was dedicated to the discussion of management options.

## 2025-06-02 ENCOUNTER — HOSPITAL ENCOUNTER (OUTPATIENT)
Dept: GENERAL RADIOLOGY | Age: 75
Discharge: HOME | End: 2025-06-02
Attending: FAMILY MEDICINE
Payer: MEDICARE

## 2025-06-02 DIAGNOSIS — S62.347D CLOSED NONDISPLACED FRACTURE OF BASE OF FIFTH METACARPAL BONE OF LEFT HAND WITH ROUTINE HEALING, SUBSEQUENT ENCOUNTER: ICD-10-CM

## 2025-06-02 DIAGNOSIS — M79.642 LEFT HAND PAIN: ICD-10-CM

## 2025-06-02 PROCEDURE — 73130 X-RAY EXAM OF HAND: CPT | Performed by: FAMILY MEDICINE

## 2025-06-06 ENCOUNTER — OFFICE VISIT (OUTPATIENT)
Facility: CLINIC | Age: 75
End: 2025-06-06
Payer: MEDICARE

## 2025-06-06 VITALS — BODY MASS INDEX: 28.5 KG/M2 | WEIGHT: 169 LBS | HEIGHT: 64.5 IN

## 2025-06-06 DIAGNOSIS — M75.102 ROTATOR CUFF SYNDROME OF LEFT SHOULDER: ICD-10-CM

## 2025-06-06 DIAGNOSIS — S62.347D CLOSED NONDISPLACED FRACTURE OF BASE OF FIFTH METACARPAL BONE OF LEFT HAND WITH ROUTINE HEALING, SUBSEQUENT ENCOUNTER: ICD-10-CM

## 2025-06-06 DIAGNOSIS — S52.552D OTHER CLOSED EXTRA-ARTICULAR FRACTURE OF DISTAL END OF LEFT RADIUS WITH ROUTINE HEALING, SUBSEQUENT ENCOUNTER: Primary | ICD-10-CM

## 2025-06-06 PROCEDURE — 1160F RVW MEDS BY RX/DR IN RCRD: CPT | Performed by: FAMILY MEDICINE

## 2025-06-06 PROCEDURE — 1159F MED LIST DOCD IN RCRD: CPT | Performed by: FAMILY MEDICINE

## 2025-06-06 PROCEDURE — 1126F AMNT PAIN NOTED NONE PRSNT: CPT | Performed by: FAMILY MEDICINE

## 2025-06-06 PROCEDURE — 99214 OFFICE O/P EST MOD 30 MIN: CPT | Performed by: FAMILY MEDICINE

## 2025-06-06 NOTE — PROGRESS NOTES
Sports Medicine Clinic Note    Subjective:    Chief Complaint: Left hand, wrist, and shoulder pain    Date of Injury: 4/16/25    History: 74-year-old right-hand dominant female presents for follow-up of left distal radius and 5th metacarpal fractures as well as left shoulder strain. She has been compliant with cast use and has avoided removing it, including during showers. Reports stiffness and swelling in the fingers, particularly in the area where the cast has been. She has not been regularly icing or elevating the hand but is now open to these interventions. Her left shoulder continues to be painful with abduction and other directional movements despite home exercise efforts.    Objective:    Left Hand and Wrist Examination:    Inspection: Cast removed. Fingers appear mildly swollen, especially the ring and small fingers. No signs of skin breakdown or infection.  Palpation: No focal bony tenderness over the distal radius or 5th metacarpal base. Generalized hand stiffness noted.  Range of Motion: Wrist motion limited due to stiffness; finger motion restricted secondary to swelling and disuse.  Neurovascular: Intact sensation throughout fingers. Capillary refill less than 2 seconds. No signs of vascular compromise.    Left Shoulder Examination:    Inspection: No swelling or deformity.  Palpation: Mild tenderness over the anterior and lateral deltoid.  Range of Motion: Pain with active abduction and forward elevation beyond 90 degrees. Passive motion slightly improved but still symptomatic.  Neurovascular: No neuromuscular deficits identified.  Special Tests: Negative drop arm test. No lag signs. El-Zay and Neer tests mildly provocative.    Diagnostic Tests:    Repeat radiographs of the left hand reveal stable healing of the base of the fifth metacarpal with slight increased callus formation. Stable distal radius fracture viewed in x-ray field as well with signs of healing. No evidence of dislocation.  Chronic osteoarthritic changes noted throughout the hand.    Left shoulder x-rays previously showed no fracture or dislocation. Mild degenerative changes with preserved joint space.    Assessment:    Healing fracture of left distal radius  Healing fracture of left 5th metacarpal base  Persistent left shoulder strain, likely soft tissue in origin    Plan:    Additional Workup: Consider MRI of the left shoulder if no improvement is seen after a course of physical therapy.  Therapy: Initiate occupational therapy for left hand and wrist to address stiffness and swelling. Begin formal physical therapy for the left shoulder focusing on range of motion and strengthening exercises.  Medications: NSAIDs and acetaminophen as needed for pain control if tolerated.  Bracing/Casting: Transition from cast to part-time wrist brace for support and protection. Provide compression sleeve to help manage swelling.  Procedures: None performed today.  Activity Recommendations: Encourage regular hand elevation and use of ice to reduce swelling. Avoid strenuous activities with the left hand and shoulder. Gradual return to activity as tolerated.    Follow-Up: 4-6 weeks to assess recovery and response to therapy.      Ramírez Pulido DO, CAQSM   Primary Care Sports Medicine

## 2025-06-18 ENCOUNTER — TELEPHONE (OUTPATIENT)
Dept: PHYSICAL THERAPY | Facility: HOSPITAL | Age: 75
End: 2025-06-18

## 2025-06-25 ENCOUNTER — OFFICE VISIT (OUTPATIENT)
Dept: OCCUPATIONAL MEDICINE | Facility: HOSPITAL | Age: 75
End: 2025-06-25
Attending: FAMILY MEDICINE
Payer: MEDICARE

## 2025-06-25 DIAGNOSIS — S52.552D OTHER CLOSED EXTRA-ARTICULAR FRACTURE OF DISTAL END OF LEFT RADIUS WITH ROUTINE HEALING, SUBSEQUENT ENCOUNTER: Primary | ICD-10-CM

## 2025-06-25 PROCEDURE — 97165 OT EVAL LOW COMPLEX 30 MIN: CPT

## 2025-06-25 PROCEDURE — 97110 THERAPEUTIC EXERCISES: CPT

## 2025-06-25 NOTE — PROGRESS NOTES
OT UE EVALUATION:     Patient:  Annie Vogel (74 year old, female)   Diagnosis:   Other closed extra-articular fracture of distal end of left radius with routine healing, subsequent encounter (I99.360I)     Referring Provider: Ramírez Pulido  Today's Date   6/25/2025    Precautions:      Date of Evaluation: 06/25/25  Next MD visit: 6/27/25  Date of Injury: 4/16/25  Date of Surgery: No data recorded     PATIENT SUMMARY     Summary of chief complaints: stiffness and edema in the LUE  History of current condition: Patient presents to OT evaluation with distal radius and 5th metacarpal fractures, as well as a left shoulder strain. She reports compliance with cast wear, including keeping the cast on showers. She reports ongoing finger stiffness and swelling, particularly in areas previously under the cast. She has not consistently used ice or elevated the hand but is now open to incorporating these strategies. Despite performing home exercises, she continues to experience pain in the left shoulder with abduction and other directional movements.   Pain level: current 0 /10, at best 0 /10, at worst 3 /10  Description of symptoms: numbness in the 4th and 5th digits, shooting pains coming from the elbow   Occupation: retired    Leisure activities/Hobbies: Hindu, gardening, cooking, community engagement   Prior level of function: INDEP  Current limitations: overall use of the LUE  Pt goals: returnt to typical acitivty community involvemnet  Hand Dominance: right  Living Situation: family    Past medical history was reviewed with Annie.  Significant findings include:    Imaging/Tests: XR HAND (MIN 3 VIEWS), LEFT (CPT=73130)(6/02/2025) There is a known fracture at the base of the fifth metacarpal.  This is essentially stable from the prior exam.  Slight increased callus formation is favored.  No evidence of dislocation.  Osteoarthritic changes are seen elsewhere throughout   the left hand.  Continued follow-up is  recommended.   Annie  has a past medical history of Anxiety (occasional by situation), Atypical ductal hyperplasia of right breast (11/01/2015), Calculus of kidney, Cataract, High blood pressure, High cholesterol, History of blood transfusion (01/01/1986), Obesity (have dropped weight), Osteoarthritis, PONV (postoperative nausea and vomiting), Type II or unspecified type diabetes mellitus without mention of complication, not stated as uncontrolled, and Visual impairment.  She  has a past surgical history that includes breast surgery procedure unlisted (01/01/1969); cataract (01/03/2011); colonoscopy (01/01/2005); hysterectomy (01/01/1988); oophorectomy (10/01/2006); edgardo biopsy stereo nodule 2 site bilat (cpt=19081/85141) (2007); colonoscopy (N/A, 12/08/2014); Breast biopsy (Right, 11/10/2015); needle biopsy right (10/2015); edgardo biopsy stereo nodule 1 site right (cpt=19081) (01/2018); edgardo localization wire 1 site right (cpt=19281) (11/2015); edgardo localization wire 1 site left (cpt=19281) (1968/2007); and other surgical history (retina surgery, breast).    ASSESSMENT  Annie presents to occupational therapy evaluation with primary c/o stiffness and edema in the LUE. The results of the objective tests and measures show limited ROM and edema in the LUE. Functional deficits include but are not limited to overall use of the LUE. Signs and symptoms are consistent with diagnosis of Other closed extra-articular fracture of distal end of left radius with routine healing, subsequent encounter (S58.706S). Pt and OT discussed evaluation findings, pathology, POC and HEP.  Pt voiced understanding and performs HEP correctly without reported pain. Skilled Occupational Therapy is medically necessary to address the above impairments and reach functional goals.    OBJECTIVE:      Musculoskeletal       Orthotics: prefabricated ulnar gutter       ROM and Strength  (* denotes performed with pain)  Wrist   ROM    R     Flex (C7) 0/45     Ext  (C6) 0/60     Ulnar Dev 0/10     Radial Dev 0/10       L Hand ROM   IF MF RF SF     MP 0/50 0/50 0/30 0/15     PIP 0/55 0/55 0/50 0/40     DIP 0/30 0/30 0/30 0/25     FINLEY 135 135 110 80      Deferred until next appropriate:  Hand Strength (lbs) R L              2 pt Pinch         3 pt Pinch         Lateral pinch           Edema:  ,   Circum Edema (cm) Wrist Crease MCP     Left 16.3 cm  18.9 cm        Neurological:  Sensory: numbness (over the dorsal and volar surface of the L hand)         ADLs/IADLs:  ADL's    Bathing: INDEP     Dressing: INDEP     Feeding: INDEP     Grooming: INDEP  IADL's     Homemaking: INDEP     Food Prep: INDEP     Driving: INDEP   Other Functional Mobility/ADL Comments:        Today's Treatment and Response:   Pt education was provided on exam findings, treatment diagnosis, treatment plan, expectations, and prognosis.    Today's Treatment       6/25/2025   OT Treatment   Therapeutic Exercise HEP Review   Therapeutic Exercise Min 10   Eval Min 35   Total Timed Procedures 10   Total Service Procedures 45   Total Time 45         Patient was instructed in and issued a HEP for:  Series of exercises completed 2-3x/day:  Flexor Tendon glides   Wrist AROM/PROM   Digit AROM/PROM     Charges:  OT EVAL Low Complexity x1, TEx1   Based on analysis of data from a problem-focused assessment from a brief chart review, clinical presentation of physical, cognitive and psychosocial skills, as well as review of patient rated outcome measures, this evaluation involved Low complexity decision making, with 1-3 occupational performance component deficits, no comorbidities, and no need for modification of tasks or assistance with assessment.                                                                                    PLAN OF CARE:      Goals: (to be met in 12 visits)   Pt will be independent and compliant with comprehensive HEP to maintain progress achieved in OT.   Pt will enhance fine motor skills for  improved hand-eye coordination and object manipulation to support self-care tasks, household chores, writing and typing.  Pt will improve tactile awareness, temperature detection, and proprioception to supper safe and effective use of the UE.    Pt will increase endurance and activity tolerance to 35 minutes with no breaks to support participation in ADL/IADL.       Frequency / Duration: Patient will be seen 1-2x/week or a total of 12 visits over a 90 day period. Treatment will include: Manual Therapy; Neuromuscular Re-education; Self-Care Home Management; Therapeutic Activities; Home Exercise Program instruction; Therapeutic Exercise; Electrical stimulation (attended); Ultrasound    Education or treatment limitation: None   Rehab Potential: good     QuickDASH Outcome Score  Score: (Patient-Rptd) 77.27 % (6/24/2025  5:40 PM)      Patient/Family/Caregiver was advised of these findings, precautions, and treatment options and has agreed to actively participate in planning and for this course of care.    Thank you for your referral. Please co-sign or sign and return this letter via fax as soon as possible to [phone]. If you have any questions, please contact me at Dept: [phone]    Sincerely,  Electronically signed by therapist: Brenna Handley OT  Physician's certification required: Yes  I certify the need for these services furnished under this plan of treatment and while under my care.    X___________________________________________________ Date____________________    Certification From: 6/25/2025  To: 9/23/2025

## 2025-06-26 NOTE — TELEPHONE ENCOUNTER
Received fax from [facility] for DM visit dated 6/26/25. Abstracted.  Placed in BD's bin for review.

## 2025-06-27 ENCOUNTER — OFFICE VISIT (OUTPATIENT)
Facility: CLINIC | Age: 75
End: 2025-06-27
Payer: MEDICARE

## 2025-06-27 ENCOUNTER — TELEPHONE (OUTPATIENT)
Dept: INTERNAL MEDICINE CLINIC | Facility: CLINIC | Age: 75
End: 2025-06-27

## 2025-06-27 ENCOUNTER — HOSPITAL ENCOUNTER (OUTPATIENT)
Dept: GENERAL RADIOLOGY | Age: 75
Discharge: HOME | End: 2025-06-27
Attending: FAMILY MEDICINE
Payer: MEDICARE

## 2025-06-27 ENCOUNTER — OFFICE VISIT (OUTPATIENT)
Dept: OCCUPATIONAL MEDICINE | Facility: HOSPITAL | Age: 75
End: 2025-06-27
Attending: FAMILY MEDICINE
Payer: MEDICARE

## 2025-06-27 VITALS — BODY MASS INDEX: 28.5 KG/M2 | WEIGHT: 169 LBS | HEIGHT: 64.5 IN

## 2025-06-27 DIAGNOSIS — M25.532 LEFT WRIST PAIN: ICD-10-CM

## 2025-06-27 DIAGNOSIS — S52.552D OTHER CLOSED EXTRA-ARTICULAR FRACTURE OF DISTAL END OF LEFT RADIUS WITH ROUTINE HEALING, SUBSEQUENT ENCOUNTER: Primary | ICD-10-CM

## 2025-06-27 DIAGNOSIS — M75.102 ROTATOR CUFF SYNDROME OF LEFT SHOULDER: ICD-10-CM

## 2025-06-27 DIAGNOSIS — S62.347D CLOSED NONDISPLACED FRACTURE OF BASE OF FIFTH METACARPAL BONE OF LEFT HAND WITH ROUTINE HEALING, SUBSEQUENT ENCOUNTER: ICD-10-CM

## 2025-06-27 PROCEDURE — 73110 X-RAY EXAM OF WRIST: CPT | Performed by: FAMILY MEDICINE

## 2025-06-27 PROCEDURE — 1159F MED LIST DOCD IN RCRD: CPT | Performed by: FAMILY MEDICINE

## 2025-06-27 PROCEDURE — 1160F RVW MEDS BY RX/DR IN RCRD: CPT | Performed by: FAMILY MEDICINE

## 2025-06-27 PROCEDURE — 99214 OFFICE O/P EST MOD 30 MIN: CPT | Performed by: FAMILY MEDICINE

## 2025-06-27 PROCEDURE — 97110 THERAPEUTIC EXERCISES: CPT

## 2025-06-27 PROCEDURE — 97140 MANUAL THERAPY 1/> REGIONS: CPT

## 2025-06-27 PROCEDURE — 1126F AMNT PAIN NOTED NONE PRSNT: CPT | Performed by: FAMILY MEDICINE

## 2025-06-27 NOTE — TELEPHONE ENCOUNTER
See 6/12/25 Procedure Gastroenterology encounter:  IMPRESSION: Normal Study    PLAN: If the patient otherwise remains asymptomatic, a repeat screening exam is recommended in 10 years.     Care gaps updated.

## 2025-06-27 NOTE — PROGRESS NOTES
Patient: Annie Vogel (74 year old, female) Referring Provider:  Insurance:   Diagnosis: Other closed extra-articular fracture of distal end of left radius with routine healing, subsequent encounter (X52.069I) Ramírez MACARIO   Date of Surgery: No data recorded Next MD visit:  N/A   Precautions:    6/27/25 Referral Information:   Date of Injury: 4/16/25 Date of Evaluation: Req: 0, Auth: 0, Exp:     06/25/25 POC Auth Visits:          Today's Date   6/27/2025    Subjective  Patient reports completing her home exercise program once yesterday. She noted soreness afterward, which she managed with ice and Advil.       Pain: 1/10     Objective  Patient participated in ROM exercises targeting the wrist and digits. Limited range of motion was observed in both the wrist and involved digits.    ROM and Strength  (* denotes performed with pain)  Wrist   ROM    R     Flex (C7) 0/45     Ext (C6) 0/60     Ulnar Dev 0/10     Radial Dev 0/10       L Hand ROM   IF MF RF SF     MP 0/50 0/50 0/30 0/15     PIP 0/55 0/55 0/50 0/40     DIP 0/30 0/30 0/30 0/25     FINLEY 135 135 110 80      Deferred until next appropriate:  Hand Strength (lbs) R L              2 pt Pinch         3 pt Pinch         Lateral pinch           Edema:  ,   Circum Edema (cm) Wrist Crease MCP     Left 16.3 cm  18.9 cm        Neurological:  Sensory: numbness (over the dorsal and volar surface of the L hand)         Assessment  Patient demonstrated good insight and motivation by asking appropriate questions about how to correctly perform her exercises at home. Continued engagement and proper technique will support further progress toward ROM goals.    Goals (to be met in 12 visits)   Pt will be independent and compliant with comprehensive HEP to maintain progress achieved in OT.   Pt will enhance fine motor skills for improved hand-eye coordination and object manipulation to support self-care tasks, household chores, writing and typing.  Pt will  improve tactile awareness, temperature detection, and proprioception to supper safe and effective use of the UE.    Pt will increase endurance and activity tolerance to 35 minutes with no breaks to support participation in ADL/IADL.         Plan  Patient will be seen 1-2x/week or a total of 12 visits over a 90 day period. Treatment will include: Manual Therapy; Neuromuscular Re-education; Self-Care Home Management; Therapeutic Activities; Home Exercise Program instruction; Therapeutic Exercise; Electrical stimulation (attended); Ultrasound    Treatment Last 4 Visits        6/25/2025 6/27/2025   OT Treatment   Treatment Day  2   Therapeutic Exercise HEP Review HEP Review   AROM/PROM wrist   AROM/PROM digits   Fllexor tendon glides    Manual Therapy  Retrograde massage    Therapeutic Exercise Min 10 35   Manual Therapy Min  10   Eval Min 35    Total Timed Procedures 10 45   Total Service Procedures 45 45   Total Time 45 45         HEP  Series of exercises completed 2-3x/day:  Flexor Tendon glides   Wrist AROM/PROM   Digit AROM/PROM    Charges     TEx2, MTx1

## 2025-06-27 NOTE — PROGRESS NOTES
Sports Medicine Clinic Note    Subjective:    Chief Complaint: Left wrist and shoulder follow-up    Date of Injury: 4/16/25    History: 74-year-old right-hand dominant female presents for follow-up of left distal radius and 5th metacarpal fractures, as well as persistent left shoulder strain. She reports confusion coordinating occupational therapy for her hand and physical therapy for her shoulder, but clarifies she is now attending both. She was recently evaluated for strength and instructed on home exercises. She continues to use a wrist brace but finds it difficult to manage during therapy sessions and is interested in a more user-friendly alternative. She denies current tenderness at the wrist or hand and recalls walking with the injury before diagnosis. She notes fatigue in her head when carrying heavy items, such as groceries.    Objective:    Left Hand and Wrist Examination:    Inspection: No visible swelling or deformity. Brace removed for evaluation.  Palpation: No tenderness at the distal radius or 5th metacarpal base.  Range of Motion: Wrist range of motion limited but improving; finger mobility preserved.  Neurovascular: Sensation intact throughout the hand. Capillary refill <2 seconds.  Special Tests: No signs of instability or ligamentous laxity on stress testing.    Left Shoulder Examination:    Inspection: No swelling or atrophy noted.  Palpation: Mild tenderness over anterior and lateral deltoid.  Range of Motion: Pain persists with active abduction and forward flexion beyond 90 degrees; passive motion less symptomatic.  Neurovascular: No deficits identified.  Special Tests: Mildly positive El-Zay and Neer tests. Drop arm test negative. No lag signs.    Diagnostic Tests:    Updated radiographs of the left wrist demonstrated a healing intra-articular fracture at the base of the fifth metacarpal with mild bony callus formation and stable alignment. Mild degenerative changes were also present  in the radiocarpal and first CMC joints.    Assessment:    Healing fracture of left distal radius  Healing fracture of left 5th metacarpal base  Persistent left shoulder strain, likely soft tissue in origin    Plan:    Additional Workup: None at this time; consider MRI of the left shoulder if pain persists despite physical therapy.  Therapy: Continue occupational therapy for left hand/wrist and physical therapy for shoulder.  Medications: NSAIDs and acetaminophen as needed for pain control.  Bracing/Casting: Provide a more easily removable wrist brace to facilitate hygiene and therapy participation.  Procedures: None performed today.  Activity Recommendations: Encourage brace removal during therapy sessions. Avoid heavy lifting and overhead activity. Gradual return to function as symptoms allow.    Follow-Up: Tentatively scheduled in 4-6 weeks to reassess progress and adjust the treatment plan as needed.      Ramírez Pulido DO, CAQSM   Primary Care Sports Medicine

## 2025-06-30 ENCOUNTER — OFFICE VISIT (OUTPATIENT)
Dept: OCCUPATIONAL MEDICINE | Facility: HOSPITAL | Age: 75
End: 2025-06-30
Attending: FAMILY MEDICINE
Payer: MEDICARE

## 2025-06-30 ENCOUNTER — TELEPHONE (OUTPATIENT)
Dept: INTERNAL MEDICINE CLINIC | Facility: CLINIC | Age: 75
End: 2025-06-30

## 2025-06-30 PROCEDURE — 97110 THERAPEUTIC EXERCISES: CPT

## 2025-06-30 PROCEDURE — 97140 MANUAL THERAPY 1/> REGIONS: CPT

## 2025-06-30 NOTE — PROGRESS NOTES
Patient: Annie Vogel (74 year old, female) Referring Provider:  Insurance:   Diagnosis: Other closed extra-articular fracture of distal end of left radius with routine healing, subsequent encounter (X99.318L) Ramírez MACARIO   Date of Surgery: No data recorded Next MD visit:  N/A   Precautions:    6/27/25 Referral Information:   Date of Injury: 4/16/25 Date of Evaluation: Req: 0, Auth: 0, Exp:     06/25/25 POC Auth Visits:          Today's Date   6/30/2025    Subjective  Patient presents to OT reporting that home exercises are going well. She states she is now able to make a tighter fist compared to Friday.       Pain: 1/10     Objective  Patient engaged in ROM exercises with the left hand. She reported minimal pain during passive stretching but was able to complete all exercises. An increase in wrist and digit ROM was observed.    ROM and Strength  (* denotes performed with pain)  Wrist   ROM    R     Flex (C7) 0/45     Ext (C6) 0/60     Ulnar Dev 0/10     Radial Dev 0/10       L Hand ROM   IF MF RF SF     MP 0/50 0/50 0/30 0/15     PIP 0/55 0/55 0/50 0/40     DIP 0/30 0/30 0/30 0/25     FINLEY 135 135 110 80      Deferred until next appropriate:  Hand Strength (lbs) R L              2 pt Pinch         3 pt Pinch         Lateral pinch           Edema:  ,   Circum Edema (cm) Wrist Crease MCP     Left 16.3 cm  18.9 cm        Neurological:  Sensory: numbness (over the dorsal and volar surface of the L hand)         Assessment  Patient demonstrated good insight and motivation, as evidenced by asking appropriate questions regarding additions to her HEP (e.g., joint blocking and alternatives to passive finger flexion). The observed improvement in ROM may be attributed to a slight reduction in edema. Overall, she is progressing well    Goals (to be met in 12 visits)   Pt will be independent and compliant with comprehensive HEP to maintain progress achieved in OT.   Pt will enhance fine motor skills for  improved hand-eye coordination and object manipulation to support self-care tasks, household chores, writing and typing.  Pt will improve tactile awareness, temperature detection, and proprioception to supper safe and effective use of the UE.    Pt will increase endurance and activity tolerance to 35 minutes with no breaks to support participation in ADL/IADL.           Plan       Treatment Last 4 Visits        6/25/2025 6/27/2025 6/30/2025   OT Treatment   Treatment Day  2 3   Therapeutic Exercise HEP Review HEP Review   AROM/PROM wrist   AROM/PROM digits   Fllexor tendon glides  AROM/PROM wrist   AROM/PROM digits   Fllexor tendon glides   Therapy wheel (wrist flexion extension  supination  pronation)      Manual Therapy  Retrograde massage  Retrograde massage    Therapeutic Exercise Min 10 35 35   Manual Therapy Min  10 10   Eval Min 35     Total Timed Procedures 10 45 45   Total Service Procedures 45 45 45   Total Time 45 45 45         HEP  Series of exercises completed 2-3x/day:  Flexor Tendon glides   Wrist AROM/PROM   Digit AROM/PROM  Joint blocking     Charges     TEx2, MTx1

## 2025-06-30 NOTE — TELEPHONE ENCOUNTER
Received fax from [facility] for 6/26/25 DM exam. Abstracted.  Placed in BD's bin for review.

## 2025-07-01 ENCOUNTER — TELEPHONE (OUTPATIENT)
Dept: PHYSICAL THERAPY | Facility: HOSPITAL | Age: 75
End: 2025-07-01

## 2025-07-03 ENCOUNTER — OFFICE VISIT (OUTPATIENT)
Dept: OCCUPATIONAL MEDICINE | Facility: HOSPITAL | Age: 75
End: 2025-07-03
Attending: FAMILY MEDICINE
Payer: MEDICARE

## 2025-07-03 PROCEDURE — 97140 MANUAL THERAPY 1/> REGIONS: CPT

## 2025-07-03 PROCEDURE — 97110 THERAPEUTIC EXERCISES: CPT

## 2025-07-03 NOTE — PROGRESS NOTES
Patient: Annie Vogel (74 year old, female) Referring Provider:  Insurance:   Diagnosis: Other closed extra-articular fracture of distal end of left radius with routine healing, subsequent encounter (D91.069V) Ramírez MACARIO   Date of Surgery: No data recorded Next MD visit:  N/A   Precautions:    6/27/25 Referral Information:   Date of Injury: 4/16/25 Date of Evaluation: Req: 0, Auth: 0, Exp:     06/25/25 POC Auth Visits:          Today's Date   7/3/2025    Subjective  Patient presents to OT noting that she has been completing her exercises at home but has not noticed significant changes in her progress.       Pain: 1/10     Objective  A slight decrease in edema and a modest increase in ROM were observed during flexor tendon gliding exercises.    ROM and Strength  (* denotes performed with pain)  Wrist   ROM    R     Flex (C7) 0/45     Ext (C6) 0/60     Ulnar Dev 0/10     Radial Dev 0/10       L Hand ROM   IF MF RF SF     MP 0/50 0/50 0/30 0/15     PIP 0/55 0/55 0/50 0/40     DIP 0/30 0/30 0/30 0/25     FINLEY 135 135 110 80      Deferred until next appropriate:  Hand Strength (lbs) R L              2 pt Pinch         3 pt Pinch         Lateral pinch           Edema:  ,   Circum Edema (cm) Wrist Crease MCP     Left 16.3 cm  18.9 cm        Neurological:  Sensory: numbness (over the dorsal and volar surface of the L hand)         Assessment  Patient is progressing overall. The improvement in ROM may be attributed to the reduction in edema. Continued OT is recommended to further address swelling, support de-inflammation techniques, and promote overall functional gains.    Goals (to be met in 12 visits)   Pt will be independent and compliant with comprehensive HEP to maintain progress achieved in OT.   Pt will enhance fine motor skills for improved hand-eye coordination and object manipulation to support self-care tasks, household chores, writing and typing.  Pt will improve tactile awareness,  temperature detection, and proprioception to supper safe and effective use of the UE.    Pt will increase endurance and activity tolerance to 35 minutes with no breaks to support participation in ADL/IADL.             Plan  Patient will be seen 1-2x/week or a total of 12 visits over a 90 day period. Treatment will include: Manual Therapy; Neuromuscular Re-education; Self-Care Home Management; Therapeutic Activities; Home Exercise Program instruction; Therapeutic Exercise; Electrical stimulation (attended); Ultrasound    Treatment Last 4 Visits        6/25/2025 6/27/2025 6/30/2025 7/3/2025   OT Treatment   Treatment Day  2 3 4   Therapeutic Exercise HEP Review HEP Review   AROM/PROM wrist   AROM/PROM digits   Fllexor tendon glides  AROM/PROM wrist   AROM/PROM digits   Fllexor tendon glides   Therapy wheel (wrist flexion extension  supination  pronation)    AROM/PROM wrist   AROM/PROM digits   Fllexor tendon glides   Therapy wheel (wrist flexion extension  supination  pronation)   Wire maze   Disk maze   ice   Manual Therapy  Retrograde massage  Retrograde massage  Soft Tissue Mobilization    Therapeutic Exercise Min 10 35 35 35   Manual Therapy Min  10 10 10   Eval Min 35      Total Timed Procedures 10 45 45 45   Total Service Procedures 45 45 45 45   Total Time 45 45 45 45         HEP  Series of exercises completed 2-3x/day:  Flexor Tendon glides   Wrist AROM/PROM   Digit AROM/PROM  Joint blocking     Charges     TEx2, MTx1

## 2025-07-07 ENCOUNTER — OFFICE VISIT (OUTPATIENT)
Dept: PHYSICAL THERAPY | Facility: HOSPITAL | Age: 75
End: 2025-07-07
Attending: FAMILY MEDICINE
Payer: MEDICARE

## 2025-07-07 ENCOUNTER — OFFICE VISIT (OUTPATIENT)
Dept: OCCUPATIONAL MEDICINE | Facility: HOSPITAL | Age: 75
End: 2025-07-07
Attending: FAMILY MEDICINE
Payer: MEDICARE

## 2025-07-07 DIAGNOSIS — M75.102 ROTATOR CUFF SYNDROME OF LEFT SHOULDER: Primary | ICD-10-CM

## 2025-07-07 PROCEDURE — 97110 THERAPEUTIC EXERCISES: CPT | Performed by: PHYSICAL THERAPIST

## 2025-07-07 PROCEDURE — 97162 PT EVAL MOD COMPLEX 30 MIN: CPT | Performed by: PHYSICAL THERAPIST

## 2025-07-07 PROCEDURE — 97140 MANUAL THERAPY 1/> REGIONS: CPT

## 2025-07-07 PROCEDURE — 97110 THERAPEUTIC EXERCISES: CPT

## 2025-07-07 NOTE — PROGRESS NOTES
Patient: Annie Vogel (74 year old, female) Referring Provider:  Insurance:   Diagnosis: Other closed extra-articular fracture of distal end of left radius with routine healing, subsequent encounter (Y21.995U)  Fracture of the base of the 5th metacarpal  Ramírez Nicolekae MACARIO   Date of Surgery: No data recorded Next MD visit:  N/A   Precautions:    6/27/25 Referral Information:   Date of Injury: 4/16/25 Date of Evaluation: Req: 0, Auth: 0, Exp:     06/25/25 POC Auth Visits:          Today's Date   7/7/2025    Subjective  Patient presents to OT reporting minimal improvement in her ability to make a full fist. She describes the hand as stiff and painful with attempts at full flexion. Additionally, she notes a \"sticky\" sensation between the digits, likely due to prolonged use of the compression sleeve and brace.       Pain: 1/10     Objective  Patient engaged in wrist and digit ROM exercises with moderate discomfort but was able to complete all activities. During the marble sorting task, she independently utilized a modification by leveraging gravity to assist in bringing marbles toward the fingertips. She was still required to manipulate them using the index finger and thumb.      ROM and Strength  (* denotes performed with pain)  Wrist   ROM    R     Flex (C7) 0/45     Ext (C6) 0/60     Ulnar Dev 0/10     Radial Dev 0/10       L Hand ROM   IF MF RF SF     MP 0/50 0/50 0/30 0/15     PIP 0/55 0/55 0/50 0/40     DIP 0/30 0/30 0/30 0/25     FINLEY 135 135 110 80      Deferred until next appropriate:  Hand Strength (lbs) R L              2 pt Pinch         3 pt Pinch         Lateral pinch           Edema:  ,   Circum Edema (cm) Wrist Crease MCP     Left 16.3 cm  18.9 cm        Neurological:  Sensory: numbness (over the dorsal and volar surface of the L hand)         Assessment  Patient is demonstrating good problem-solving skills and activity tolerance despite ongoing discomfort and limited range of motion.  Her ability to complete tasks using adaptive techniques reflects strong motivation and insight into her recovery. The focus moving forward will remain on improving finger flexion and addressing soft tissue stiffness to enhance functional grasp. She was advised to intermittently remove the brace during periods of rest, such as while watching TV, to allow the skin to breathe and prevent potential maceration, as some early signs of skin breakdown were observed.    Goals (to be met in 12 visits)   Pt will be independent and compliant with comprehensive HEP to maintain progress achieved in OT.   Pt will enhance fine motor skills for improved hand-eye coordination and object manipulation to support self-care tasks, household chores, writing and typing.  Pt will improve tactile awareness, temperature detection, and proprioception to supper safe and effective use of the UE.    Pt will increase endurance and activity tolerance to 35 minutes with no breaks to support participation in ADL/IADL.               Plan  Patient will be seen 1-2x/week or a total of 12 visits over a 90 day period. Treatment will include: Manual Therapy; Neuromuscular Re-education; Self-Care Home Management; Therapeutic Activities; Home Exercise Program instruction; Therapeutic Exercise; Electrical stimulation (attended); Ultrasound    Treatment Last 4 Visits        6/27/2025 6/30/2025 7/3/2025 7/7/2025   OT Treatment   Treatment Day 2 3 4 5   Therapeutic Exercise HEP Review   AROM/PROM wrist   AROM/PROM digits   Fllexor tendon glides  AROM/PROM wrist   AROM/PROM digits   Fllexor tendon glides   Therapy wheel (wrist flexion extension  supination  pronation)    AROM/PROM wrist   AROM/PROM digits   Fllexor tendon glides   Therapy wheel (wrist flexion extension  supination  pronation)   Doug lopes   Disk maze   ice AROM/PROM wrist   AROM/PROM digits   Fllexor tendon glides   Therapy wheel (wrist flexion extension  supination  pronation)   Doug  maze   Disk maze   [place] sorting (hand pronated ; index and thumb)    Manual Therapy Retrograde massage  Retrograde massage  Soft Tissue Mobilization  Retrograde Massage    Therapeutic Exercise Min 35 35 35 35   Manual Therapy Min 10 10 10 10   Total Timed Procedures 45 45 45 45   Total Service Procedures 45 45 45 45   Total Time 45 45 45 45         HEP  Series of exercises completed 2-3x/day:  Flexor Tendon glides   Wrist AROM/PROM   Digit AROM/PROM  Joint blocking       Charges     TEx2, MTx1

## 2025-07-09 ENCOUNTER — OFFICE VISIT (OUTPATIENT)
Dept: OCCUPATIONAL MEDICINE | Facility: HOSPITAL | Age: 75
End: 2025-07-09
Attending: FAMILY MEDICINE
Payer: MEDICARE

## 2025-07-09 PROCEDURE — 97140 MANUAL THERAPY 1/> REGIONS: CPT

## 2025-07-09 PROCEDURE — 97110 THERAPEUTIC EXERCISES: CPT

## 2025-07-09 NOTE — PROGRESS NOTES
Patient: Annie Vogel (74 year old, female) Referring Provider:  Insurance:   Diagnosis: Other closed extra-articular fracture of distal end of left radius with routine healing, subsequent encounter (M34.877P) Ramírez MACARIO   Date of Surgery: No data recorded Next MD visit:  N/A   Precautions:    6/27/25 Referral Information:   Date of Injury: 4/16/25 Date of Evaluation: Req: 0, Auth: 0, Exp:     06/25/25 POC Auth Visits:          Today's Date   7/9/2025    Subjective  Patient reports that the stickiness between her fingers is improving. Overall, her hands are feeling better, and she has noticed a reduction in swelling.       Pain: 1/10     Objective  Patient engaged in ROM exercises with minimal discomfort.      ROM and Strength  (* denotes performed with pain)  Wrist   ROM    R     Flex (C7) 0/45     Ext (C6) 0/60     Ulnar Dev 0/10     Radial Dev 0/10       L Hand ROM   IF MF RF SF     MP 0/50 0/50 0/30 0/15     PIP 0/55 0/55 0/50 0/40     DIP 0/30 0/30 0/30 0/25     FINLEY 135 135 110 80      Deferred until next appropriate:  Hand Strength (lbs) R L              2 pt Pinch         3 pt Pinch         Lateral pinch           Edema:  ,   Circum Edema (cm) Wrist Crease MCP     Left 16.3 cm  18.9 cm        Neurological:  Sensory: numbness (over the dorsal and volar surface of the L hand)         Assessment  The reduction in edema may be contributing to the observed increase in ROM. Additionally, improved skin integrity between digits suggests that tissue irritation is decreasing. Overall, patient is progressing well and demonstrating increased tolerance for movement. Continued focus will be placed on maintaining gains and further improving functional use of the hand.    Goals (to be met in 12 visits)   Pt will be independent and compliant with comprehensive HEP to maintain progress achieved in OT.   Pt will enhance fine motor skills for improved hand-eye coordination and object manipulation to  support self-care tasks, household chores, writing and typing.  Pt will improve tactile awareness, temperature detection, and proprioception to supper safe and effective use of the UE.    Pt will increase endurance and activity tolerance to 35 minutes with no breaks to support participation in ADL/IADL.                 Plan  Patient will be seen 1-2x/week or a total of 12 visits over a 90 day period. Treatment will include: Manual Therapy; Neuromuscular Re-education; Self-Care Home Management; Therapeutic Activities; Home Exercise Program instruction; Therapeutic Exercise; Electrical stimulation (attended); Ultrasound    Treatment Last 4 Visits        6/30/2025 7/3/2025 7/7/2025 7/9/2025   OT Treatment   Treatment Day 3 4 5 6   Therapeutic Exercise AROM/PROM wrist   AROM/PROM digits   Fllexor tendon glides   Therapy wheel (wrist flexion extension  supination  pronation)    AROM/PROM wrist   AROM/PROM digits   Fllexor tendon glides   Therapy wheel (wrist flexion extension  supination  pronation)   Wire maze   Disk maze   ice AROM/PROM wrist   AROM/PROM digits   Fllexor tendon glides   Therapy wheel (wrist flexion extension  supination  pronation)   Wire maze   Disk maze   [place] sorting (hand pronated ; index and thumb)  AROM/PROM wrist   AROM/PROM digits   Fllexor tendon glides   Wire maze    [place] sorting (hand pronated ; index and thumb)   Sponge  ( finger adduction + fist)   ice   Manual Therapy Retrograde massage  Soft Tissue Mobilization  Retrograde Massage  Retrograde Massage   Therapeutic Exercise Min 35 35 35 35   Manual Therapy Min 10 10 10 10   Total Timed Procedures 45 45 45 45   Total Service Procedures 45 45 45 45   Total Time 45 45 45 45         HEP  Series of exercises completed 2-3x/day:  Flexor Tendon glides   Wrist AROM/PROM   Digit AROM/PROM  Joint blocking     Charges     TEx2, MTx1

## 2025-07-10 ENCOUNTER — OFFICE VISIT (OUTPATIENT)
Dept: PHYSICAL THERAPY | Facility: HOSPITAL | Age: 75
End: 2025-07-10
Attending: FAMILY MEDICINE
Payer: MEDICARE

## 2025-07-10 PROCEDURE — 97140 MANUAL THERAPY 1/> REGIONS: CPT | Performed by: PHYSICAL THERAPIST

## 2025-07-10 PROCEDURE — 97110 THERAPEUTIC EXERCISES: CPT | Performed by: PHYSICAL THERAPIST

## 2025-07-11 NOTE — PROGRESS NOTES
Patient: Annie Vogel (74 year old, female) Referring Provider:  Insurance:   Diagnosis: M75.102 (ICD-10-CM) - 726.10 (ICD-9-CM) - Rotator cuff syndrome of left shoulder Ramírez MACARIO   Date of Surgery: No data recorded Next MD visit:  N/A   Precautions:    No data recorded Referral Information:    Date of Evaluation: Req: 0, Auth: 0, Exp:     No data recorded POC Auth Visits:          Today's Date   7/10/2025    Subjective  States that she notes pain with trying to use her left UE       Pain: 4/10     Objective  Seen for first treatment.            Assessment  Progressing well with current plan.  Did have some pain with completing wall washes with left UE into scap depression    Goals (to be met in  )      Plan  Continue    Treatment Last 4 Visits  Treatment Day: 2       7/10/2025   UE Treatment   Therapeutic Exercise Scap clocks x2x10 sidelying right   Sidelying right IR/ER 2x10   Wall washes x5  *Pain stopped   Scap retractions x10 standing with sb at table   Horizontal movement with sb standing at table      Manual Therapy PROM left UE   STM to anterior left shoulder    RS to left shoulder IR/ER 45 abd   Serratus punches isometrics (submax) multiple bouts   Inferior glide xmltip bouts gr 2 left shoulder   PA;s 90 felx gr 2    Therapeutic Exercise Minutes 15   Manual Therapy Minutes 30   Total Time Of Timed Procedures 45   Total Time Of Service-Based Procedures 0   Total Treatment Time 45        HEP       Charges  1 TE 2 MT

## 2025-07-14 ENCOUNTER — OFFICE VISIT (OUTPATIENT)
Dept: OCCUPATIONAL MEDICINE | Facility: HOSPITAL | Age: 75
End: 2025-07-14
Attending: FAMILY MEDICINE
Payer: MEDICARE

## 2025-07-14 ENCOUNTER — OFFICE VISIT (OUTPATIENT)
Dept: PHYSICAL THERAPY | Facility: HOSPITAL | Age: 75
End: 2025-07-14
Attending: FAMILY MEDICINE
Payer: MEDICARE

## 2025-07-14 PROCEDURE — 97140 MANUAL THERAPY 1/> REGIONS: CPT

## 2025-07-14 PROCEDURE — 97140 MANUAL THERAPY 1/> REGIONS: CPT | Performed by: PHYSICAL THERAPIST

## 2025-07-14 PROCEDURE — 97110 THERAPEUTIC EXERCISES: CPT

## 2025-07-14 PROCEDURE — 97110 THERAPEUTIC EXERCISES: CPT | Performed by: PHYSICAL THERAPIST

## 2025-07-14 NOTE — PROGRESS NOTES
Patient: Annie Vogel (74 year old, female) Referring Provider:  Insurance:   Diagnosis: M75.102 (ICD-10-CM) - 726.10 (ICD-9-CM) - Rotator cuff syndrome of left shoulder Ramírez MACARIO   Date of Surgery: No data recorded Next MD visit:  N/A   Precautions:    No data recorded Referral Information:    Date of Evaluation: Req: 0, Auth: 0, Exp:     No data recorded POC Auth Visits:          Today's Date   7/14/2025    Subjective  Has a question about the pendulum exercises;  other exerices are going well.       Pain: 4/10     Objective  Seen for treatment.  Palpation with decreased tenderness along lateral aspect of the left shoulder .  Reviewed pendulum exercises without difficulty .            Assessment  No complaitns of pain with exerices, avoided overhead rotational component on this session .    Goals (to be met in 12 visits)   Goals: (to be met in 12 visits)     Not Met Progress Toward Partially Met Met   Pt will report improved ability to sleep without waking due to shoulder pain. []  []  []  []    Pt will improve shoulder flexion AROM to >100 degrees to be able to reach into overhead cabinets without pain or restriction. []  []  []  []    Pt will improve shoulder abduction AROM to >100 degrees to improve ability to don deodorant, don/doff shirts, and wash hair. []  []  []  []    Pt will increase shoulder AROM ER to >45 to be able to reach and fasten seatbelt. []  []  []  []    Pt will increase shoulder AROM IR to >45 to be able to reach in back pocket, tuck in shirt, and turn steering wheel without pain. []  []  []  []    Pt will improve shoulder strength throughout to 4/5 to improve function with home and self care activities .  []  []  []  []    Pt will demonstrate increased mid/low trap strength to 5/5 to promote improved shoulder mechanics and stabilization with lifting and reaching. []  []  []  []    Pt will be independent and compliant with comprehensive HEP to maintain progress achieved  in PT. []  []  []  []         Plan  Continue    Treatment Last 4 Visits  Treatment Day: 3       7/7/2025 7/10/2025 7/14/2025   UE Treatment   Therapeutic Exercise  Scap clocks x2x10 sidelying right   Sidelying right IR/ER 2x10   Wall washes x5  *Pain stopped   Scap retractions x10 standing with sb at table   Horizontal movement with sb standing at table    Scap clocks sidelying right multiple bouts   Horizontal wall walks/no tb   Standing red TC rows x20  Standing red TC shoulder  extension x20      Manual Therapy  PROM left UE   STM to anterior left shoulder    RS to left shoulder IR/ER 45 abd   Serratus punches isometrics (submax) multiple bouts   Inferior glide xmltip bouts gr 2 left shoulder   PA;s 90 felx gr 2  PROM left UE   STM to anterior left shoulder    RS to left shoulder IR/ER 45 abd   Serratus punches isometrics (submax) multiple bouts   Inferior glide xmltip bouts gr 2 left shoulder   PA;s 90 felx gr 2      Therapeutic Exercise Minutes 15 15 30   Manual Therapy Minutes  30 15   Evaluation Minutes 30     Total Time Of Timed Procedures 15 45 45   Total Time Of Service-Based Procedures 30 0 0   Total Treatment Time 45 45 45   HEP Pendulum  Bent over rows           HEP  Pendulum  Bent over rows     Charges  2 TE 1 MT

## 2025-07-14 NOTE — PROGRESS NOTES
UPPER EXTREMITY EVALUATION:     Diagnosis:   M75.102 (ICD-10-CM) - 726.10 (ICD-9-CM) - Rotator cuff syndrome of left shoulder Patient:  Annie Vogel (74 year old, female)        Referring Provider: Ramírez Pulido  Today's Date   7/7/2025    Precautions:      Date of Evaluation: No data recorded  Next MD visit: No data recorded  Date of Surgery: No data recorded     PATIENT SUMMARY     Summary of chief complaints: Left shoulder pain, discomfort after falling on left side.  Also fx of left fifth MC felicia getting OT  History of current condition: Left shoulder pain after she fell and landed on her left side, 4/16/25. .  Noted left wrist, shoulder pain and discomfort.  Currently having OT for left wrist pain , discomfort.  Left shoulder pain currently described as Shooting pain all the way from shoulder to mid forearm with active movement. Pain with abd >90, reaching behind her back.  Right hand dominant.   Now using the right UE more for home and self-care.  Has had PT in the past for her right shoulder/pain. Sleeping is a challenge; needs to find a comfortable position for the left UE.   Side sleeper before fall.  Left >right.   Treis to sleep on her Back is ok, but gets thirsty which is what wakes her up.   A couple of times wakes up due to shoulder and hand pain .   Hand is uncomfortable., try to not use my left UE too much.     Can't hook bra, hooks in front and then shift it around; tendency to get pain in shoulder with active movement.   Reaching behind, pulling up pants.  Washing hair is hard, but can do it   Laundry carrying is hard but has tried to modify it by not carrying items   Pain level: current 3 /10, at best 1 /10, at worst 7 /10  Description of symptoms: pain in left shoudler, posterior aspect, carola with lifting, moving her left shoulder including home and self care activities.   Occupation: retired teacher   Leisure activities/Hobbies:     Prior level of function:  indepdendent  Current limitations: limited with home, self care acitivites including wahing hair, dressing  Pt goals: After falling on uneven pavement, my left hand took the brunt followed by the shoulder.  X rays show no broken bones. I do experience some shooting painsdown my arm when lifting arm to the side, slightly back and reaching above my head  Hand Dominance: right   Past medical history was reviewed with Annie.  Significant findings include:    Imaging/Tests: There is an oblique fracture at the proximal base of the 5th metacarpal bone.  Mild angulation is noted.   Annie  has a past medical history of Anxiety (occasional by situation), Atypical ductal hyperplasia of right breast (11/01/2015), Calculus of kidney, Cataract, High blood pressure, High cholesterol, History of blood transfusion (01/01/1986), Obesity (have dropped weight), Osteoarthritis, PONV (postoperative nausea and vomiting), Type II or unspecified type diabetes mellitus without mention of complication, not stated as uncontrolled, and Visual impairment.  She  has a past surgical history that includes breast surgery procedure unlisted (01/01/1969); cataract (01/03/2011); colonoscopy (01/01/2005); hysterectomy (01/01/1988); oophorectomy (10/01/2006); edgardo biopsy stereo nodule 2 site bilat (cpt=19081/48289) (2007); colonoscopy (N/A, 12/08/2014); Breast biopsy (Right, 11/10/2015); needle biopsy right (10/2015); edgardo biopsy stereo nodule 1 site right (cpt=19081) (01/2018); edgardo localization wire 1 site right (cpt=19281) (11/2015); edgardo localization wire 1 site left (cpt=19281) (1968/2007); and other surgical history (retina surgery, breast).    ASSESSMENT  Annie presents to physical therapy evaluation with primary c/o Left shoulder pain, discomfort after falling on left side.  Also fx of left fifth  felicia getting OT. The results of the objective tests and measures show S/p fall and landed on her left arm, wrist/hand.  Is currently working with  OT as well for hand . Has a loss of ROM, strength, pain with any active Left UE movement carola adls inlcuidng home and self care 10. Functional deficits include but are not limited to limited with home, self care acitivites including wahing hair, dressing. Signs and symptoms are consistent with diagnosis of M75.102 (ICD-10-CM) - 726.10 (ICD-9-CM) - Rotator cuff syndrome of left shoulder. Pt and PT discussed evaluation findings, pathology, POC and HEP.  Pt voiced understanding and performs HEP correctly without reported pain. Skilled Physical Therapy is medically necessary to address the above impairments and reach functional goals.    OBJECTIVE:      Musculoskeletal  Observation/Posture: forward head posture; upper cervical extension; scapula abduction; increased thoracic kyphosis  Palpation: TTP along left anterior shoulder , along long head of biceps, lateral scap border   Accessory motion: lack of inferior glide left shoulder    Special Tests: +Impingmenet - Empty can      ROM and Strength (* denotes performed with pain)  Shoulder   ROM MMT (-/5)    R L R L     Flex   91 4 3     Ext     4 3     Abd (C5)   71 4 3     IR   35 4 3     ER   41 4 3     Low Trap n/a 3 3     Mid Trap n/a 3 3     SA n/a 3 3       Flexibility:        Neurological:  Sensation: intact      Today's Treatment and Response:   Pt education was provided on exam findings, treatment diagnosis, treatment plan, expectations, and prognosis.    Today's Treatment       7/10/2025   UE Treatment   Therapeutic Exercise Scap clocks x2x10 sidelying right   Sidelying right IR/ER 2x10   Wall washes x5  *Pain stopped   Scap retractions x10 standing with sb at table   Horizontal movement with sb standing at table      Manual Therapy PROM left UE   STM to anterior left shoulder    RS to left shoulder IR/ER 45 abd   Serratus punches isometrics (submax) multiple bouts   Inferior glide xmltip bouts gr 2 left shoulder   PA;s 90 felx gr 2    Therapeutic Exercise  Minutes 15   Manual Therapy Minutes 30   Total Time Of Timed Procedures 45   Total Time Of Service-Based Procedures 0   Total Treatment Time 45        Patient was instructed in and issued a HEP for: Pendulum  Bent over rows     Charges:  PT EVAL: Moderate Complexity, 1 TE 1IE  In agreement with evaluation findings and clinical rationale, this evaluation involved MODERATE COMPLEXITY decision making due to 1-2 personal factors/comorbidities, 3 or more body structures involved/activity limitations, and evolving symptoms as documented in the evaluation.                                                          PLAN OF CARE:      Goals: (to be met in 12 visits)    Not Met Progress Toward Partially Met Met   Pt will report improved ability to sleep without waking due to shoulder pain. [] [] [] []   Pt will improve shoulder flexion AROM to >100 degrees to be able to reach into overhead cabinets without pain or restriction. [] [] [] []   Pt will improve shoulder abduction AROM to >100 degrees to improve ability to don deodorant, don/doff shirts, and wash hair. [] [] [] []   Pt will increase shoulder AROM ER to >45 to be able to reach and fasten seatbelt. [] [] [] []   Pt will increase shoulder AROM IR to >45 to be able to reach in back pocket, tuck in shirt, and turn steering wheel without pain. [] [] [] []   Pt will improve shoulder strength throughout to 4/5 to improve function with home and self care activities .  [] [] [] []   Pt will demonstrate increased mid/low trap strength to 5/5 to promote improved shoulder mechanics and stabilization with lifting and reaching. [] [] [] []   Pt will be independent and compliant with comprehensive HEP to maintain progress achieved in PT. [] [] [] []         Frequency / Duration: Patient will be seen 2x/week or a total of 12  visits over a 90 day period. Treatment will include: Manual Therapy; Neuromuscular Re-education; Self-Care Home Management; Therapeutic Activities; Therapeutic  Exercise; Home Exercise Program instruction; Patient/Family Education    Education or treatment limitation: None   Rehab Potential: good     QuickDASH Outcome Score  Score: (Patient-Rptd) 68.18 % (7/1/2025  6:09 AM)      Patient/Family/Caregiver was advised of these findings, precautions, and treatment options and has agreed to actively participate in planning and for this course of care.    Thank you for your referral. Please co-sign or sign and return this letter via fax as soon as possible to [phone]. If you have any questions, please contact me at Dept: [phone]    Sincerely,  Electronically signed by therapist: Lorie Kuo PT  Physician's certification required: Yes  I certify the need for these services furnished under this plan of treatment and while under my care.    X___________________________________________________ Date____________________    Certification From: 7/14/2025  To: 10/12/2025

## 2025-07-14 NOTE — PROGRESS NOTES
Patient: Annie Vogel (74 year old, female) Referring Provider:  Insurance:   Diagnosis: Other closed extra-articular fracture of distal end of left radius with routine healing, subsequent encounter (W27.313M) Ramírez MACARIO   Date of Surgery: No data recorded Next MD visit:  N/A   Precautions:    6/27/25 Referral Information:   Date of Injury: 4/16/25 Date of Evaluation: Req: 0, Auth: 0, Exp:     06/25/25 POC Auth Visits:          Today's Date   7/14/2025    Subjective  Patient presents to OT noting that swelling in the hand has reduced slightly. She reports no pain today.       Pain: 0/10     Objective  Patient engaged in ROM exercises with minimal discomfort and successfully achieved opposition with each finger, a motion that had not been possible until today.      ROM and Strength  (* denotes performed with pain)  Wrist   ROM    R     Flex (C7) 0/45     Ext (C6) 0/60     Ulnar Dev 0/10     Radial Dev 0/10       L Hand ROM   IF MF RF SF     MP 0/50 0/50 0/30 0/15     PIP 0/55 0/55 0/50 0/40     DIP 0/30 0/30 0/30 0/25     FINLEY 135 135 110 80      Deferred until next appropriate:  Hand Strength (lbs) R L              2 pt Pinch         3 pt Pinch         Lateral pinch           Edema:  ,   Circum Edema (cm) Wrist Crease MCP     Left 16.3 cm  18.9 cm        Neurological:  Sensory: numbness (over the dorsal and volar surface of the L hand)         Assessment  The patient’s ability to achieve full finger opposition is indicative of increased ROM, which may be attributed to the reduction in edema. This improvement reflects progress in mobility and joint flexibility. Additionally, her consistent participation and symptom monitoring demonstrate strong engagement in the rehab process and support continued gains in fine motor coordination and functional hand use.    Goals (to be met in 12 visits)   Pt will be independent and compliant with comprehensive HEP to maintain progress achieved in OT.   Pt  will enhance fine motor skills for improved hand-eye coordination and object manipulation to support self-care tasks, household chores, writing and typing.  Pt will improve tactile awareness, temperature detection, and proprioception to supper safe and effective use of the UE.    Pt will increase endurance and activity tolerance to 35 minutes with no breaks to support participation in ADL/IADL.                   Plan  Patient will be seen 1-2x/week or a total of 12 visits over a 90 day period. Treatment will include: Manual Therapy; Neuromuscular Re-education; Self-Care Home Management; Therapeutic Activities; Home Exercise Program instruction; Therapeutic Exercise; Electrical stimulation (attended); Ultrasound    Treatment Last 4 Visits        7/3/2025 7/7/2025 7/9/2025 7/14/2025   OT Treatment   Treatment Day 4 5 6 7   Therapeutic Exercise AROM/PROM wrist   AROM/PROM digits   Fllexor tendon glides   Therapy wheel (wrist flexion extension  supination  pronation)   Wire maze   Disk maze   ice AROM/PROM wrist   AROM/PROM digits   Fllexor tendon glides   Therapy wheel (wrist flexion extension  supination  pronation)   Wire maze   Disk maze   Herman sorting (hand pronated ; index and thumb)  AROM/PROM wrist   AROM/PROM digits   Fllexor tendon glides   Wire maze    Herman sorting (hand pronated ; index and thumb)   Sponge  ( finger adduction + fist)   ice AROM/PROM wrist   AROM/PROM digits   Fllexor tendon glides   Disc maze    Herman sorting (yellow ; hand pronated ; index and thumb)   Sponge  ( finger adduction + fist)   Finger opposition (slide digits up and down the thumb)   ice   Manual Therapy Soft Tissue Mobilization  Retrograde Massage  Retrograde Massage Retrograde massage    Therapeutic Exercise Min 35 35 35 35   Manual Therapy Min 10 10 10 10   Total Timed Procedures 45 45 45 45   Total Service Procedures 45 45 45 45   Total Time 45 45 45 45         HEP  Series of exercises completed  2-3x/day:  Flexor Tendon glides   Wrist AROM/PROM   Digit AROM/PROM  Joint blocking    Charges     TEx2

## 2025-07-16 ENCOUNTER — OFFICE VISIT (OUTPATIENT)
Dept: OCCUPATIONAL MEDICINE | Facility: HOSPITAL | Age: 75
End: 2025-07-16
Attending: FAMILY MEDICINE
Payer: MEDICARE

## 2025-07-16 PROCEDURE — 97140 MANUAL THERAPY 1/> REGIONS: CPT

## 2025-07-16 PROCEDURE — 97110 THERAPEUTIC EXERCISES: CPT

## 2025-07-16 NOTE — PROGRESS NOTES
Progress Summary  Pt has attended 8 visits in Occupational Therapy.      Patient: Annie Vogel (74 year old, female) Referring Provider:  Insurance:   Diagnosis: Other closed extra-articular fracture of distal end of left radius with routine healing, subsequent encounter (X83.293O) Ramírez MACARIO   Date of Surgery: No data recorded Next MD visit:  N/A   Precautions:    6/27/25 Referral Information:   Date of Injury: 4/16/25 Date of Evaluation: Req: 0, Auth: 0, Exp:     06/25/25 POC Auth Visits:          Today's Date   7/16/2025    Subjective  Patient presents to OT reporting she is noticing improvements in ROM during home exercises; however, she experiences stiffness during periods of rest between exercises.       Pain: 0/10     Objective  Patient participated in wrist and digit ROM exercises, demonstrating significant improvement in overall range of motion and a slight reduction in edema.      PROGRESS  ROM and Strength  (* denotes performed with pain)  Wrist   ROM    R     Flex (C7) 0/70     Ext (C6) 0/65     Ulnar Dev 0/30     Radial Dev 0/25       L Hand ROM   IF MF RF SF     MP 0/60 0/65 0/64 0/30     PIP 0/90 0/85 0/80 0/80     DIP 0/50 0/50 0/50 0/50     FINLEY 200 200 194 160      Deferred until next appropriate:  Hand Strength (lbs) R L       39.4 lbs  11.4 lbs      2 pt Pinch 5  2     3 pt Pinch 7  4     Lateral pinch 11  7       Edema:  Circum Edema (cm) Wrist Crease MCP     Left 16.0 cm  18.5 cm      -----------------------------------  EVALUATION  ROM and Strength  (* denotes performed with pain)  Wrist   ROM    R     Flex (C7) 0/45     Ext (C6) 0/60     Ulnar Dev 0/10     Radial Dev 0/10       L Hand ROM   IF MF RF SF     MP 0/50 0/50 0/30 0/15     PIP 0/55 0/55 0/50 0/40     DIP 0/30 0/30 0/30 0/25     FINLEY 135 135 110 80      Deferred until next appropriate:  Hand Strength (lbs) R L              2 pt Pinch         3 pt Pinch         Lateral pinch           Edema:  ,   Circum  Edema (cm) Wrist Crease MCP     Left 16.3 cm  18.9 cm             Assessment  The increase in ROM may be attributed to the gradual decrease in edema. Continued edema reduction will likely lead to further gains. New goals were established to address  and pinch strength, aiming to improve her ability to complete functional tasks such as opening water bottles and door handles. Overall, the patient is progressing well and demonstrating consistent improvement.    Goals (to be met in 12 visits)   Pt will be independent and compliant with comprehensive HEP to maintain progress achieved in OT. (MET)  Pt will enhance fine motor skills for improved hand-eye coordination and object manipulation to support self-care tasks, household chores, writing and typing. (PROGRESSING)  Pt will improve tactile awareness, temperature detection, and proprioception to supper safe and effective use of the UE.  (PROGRESSING)   Pt will increase endurance and activity tolerance to 35 minutes with no breaks to support participation in ADL/IADL. (PROGRESSING)   Pt will increase  strength by 15 lbs and 3 point pinch strength by 2 lbs to o increase upper extremity strength, range of motion, and coordination to support daily tasks (NEW GOAL)        Plan  Patient will be seen 1-2x/week or a total of 12 visits over a 90 day period. Treatment will include: Manual Therapy; Neuromuscular Re-education; Self-Care Home Management; Therapeutic Activities; Home Exercise Program instruction; Therapeutic Exercise; Electrical stimulation (attended); Ultrasound    Treatment Last 4 Visits        7/7/2025 7/9/2025 7/14/2025 7/16/2025   OT Treatment   Treatment Day 5 6 7 8   Therapeutic Exercise AROM/PROM wrist   AROM/PROM digits   Fllexor tendon glides   Therapy wheel (wrist flexion extension  supination  pronation)   Wire maze   Disk maze   Farrell sorting (hand pronated ; index and thumb)  AROM/PROM wrist   AROM/PROM digits   Fllexor tendon glides   Wire  maze    Costa sorting (hand pronated ; index and thumb)   Sponge  ( finger adduction + fist)   ice AROM/PROM wrist   AROM/PROM digits   Fllexor tendon glides   Disc maze    Costa sorting (yellow ; hand pronated ; index and thumb)   Sponge  ( finger adduction + fist)   Finger opposition (slide digits up and down the thumb)   ice Reassessment   AROM/PROM wrist   Flexor tendon Glides   Finger opposition with sliding down the thumb   Green web (flexion extension)   Sponge  (finger adduciton fist)   Costa sorting( full hand grasp  finger opposition)        Manual Therapy Retrograde Massage  Retrograde Massage Retrograde massage  Retrograde Massage    Therapeutic Exercise Min 35 35 35 35   Manual Therapy Min 10 10 10 10   Total Timed Procedures 45 45 45 45   Total Service Procedures 45 45 45 45   Total Time 45 45 45 45         HEP  Series of exercises completed 2-3x/day:  Flexor Tendon glides   Wrist AROM/PROM   Digit AROM/PROM  Joint blocking    Charges     TEx2, MTx1    Plan: Patient will be seen 1-2x/week or a total of 12 visits over a 90 day period. Treatment will include: Manual Therapy; Neuromuscular Re-education; Self-Care Home Management; Therapeutic Activities; Home Exercise Program instruction; Therapeutic Exercise; Electrical stimulation (attended); Ultrasound    Patient/Family/Caregiver was advised of these findings, precautions, and treatment options and has agreed to actively participate in planning and for this course of care.    Thank you for your referral. If you have any questions, please contact me at Dept: [phone].    Sincerely,  Electronically signed by therapist: Brenna Handley OT     Physician's certification required:  Yes  Please co-sign or sign and return this letter via fax as soon as possible to [phone].   I certify the need for these services furnished under this plan of treatment and while under my  care.    X___________________________________________________ Date____________________    Certification From: 7/16/2025  To:10/14/2025

## 2025-07-17 ENCOUNTER — OFFICE VISIT (OUTPATIENT)
Dept: PHYSICAL THERAPY | Facility: HOSPITAL | Age: 75
End: 2025-07-17
Attending: FAMILY MEDICINE
Payer: MEDICARE

## 2025-07-17 PROCEDURE — 97140 MANUAL THERAPY 1/> REGIONS: CPT | Performed by: PHYSICAL THERAPIST

## 2025-07-17 PROCEDURE — 97110 THERAPEUTIC EXERCISES: CPT | Performed by: PHYSICAL THERAPIST

## 2025-07-17 NOTE — PROGRESS NOTES
Patient: Annie Vogel (74 year old, female) Referring Provider:  Insurance:   Diagnosis: M75.102 (ICD-10-CM) - 726.10 (ICD-9-CM) - Rotator cuff syndrome of left shoulder Ramírez Nicolekae MACARIO   Date of Surgery: No data recorded Next MD visit:  N/A   Precautions:    No data recorded Referral Information:    Date of Evaluation: Req: 0, Auth: 0, Exp:     No data recorded POC Auth Visits:          Today's Date   7/17/2025    Subjective  Cracking is still there, but it doesnt hurt like it used to.       Pain: 4/10     Objective  Seen for treatment.       Shoulder       7/7/2025 7/17/2025   Shoulder ROM/MMT   Lt Flexion Shoulder 91 112   Rt Flexion Shoulder MMT 4    Lt Flexion Shoulder MMT 3    Rt Extension Shoulder MMT 4    Rt Extension Shoulder MMT 3    Lt Abduction Shoulder 71 92   Rt Abduction Shoulder MMT (C5) 4    Lt Abduction Shoulder MMT (C5) 3    Lt IR Shoulder 35    Rt IR Shoulder MMT 4    Lt IR Shoulder MMT 3    Lt ER Shoulder 41    Rt ER Shoulder MMT 4    Lt ER Shoulder MMT 3    Rt Low Trap MMT 3    Lt Low Trap MMT 3    Rt Mid Trap MMT 3    Lt Mid Trap MMT 3    Rt SA MMT 3    Lt SA MMT 3         Assessment  Added free weights 1.5 for stregnthening to shoulder . no pain reported    Goals (to be met in 12 visits)   Goals: (to be met in 12 visits)     Not Met Progress Toward Partially Met Met   Pt will report improved ability to sleep without waking due to shoulder pain. []  []  []  []    Pt will improve shoulder flexion AROM to >100 degrees to be able to reach into overhead cabinets without pain or restriction. []  []  []  []    Pt will improve shoulder abduction AROM to >100 degrees to improve ability to don deodorant, don/doff shirts, and wash hair. []  []  []  []    Pt will increase shoulder AROM ER to >45 to be able to reach and fasten seatbelt. []  []  []  []    Pt will increase shoulder AROM IR to >45 to be able to reach in back pocket, tuck in shirt, and turn steering wheel without pain. []  []   []  []    Pt will improve shoulder strength throughout to 4/5 to improve function with home and self care activities .  []  []  []  []    Pt will demonstrate increased mid/low trap strength to 5/5 to promote improved shoulder mechanics and stabilization with lifting and reaching. []  []  []  []    Pt will be independent and compliant with comprehensive HEP to maintain progress achieved in PT. []  []  []  []             Plan  Continue    Treatment Last 4 Visits  Treatment Day: 4       7/7/2025 7/10/2025 7/14/2025 7/17/2025   UE Treatment   Therapeutic Exercise  Scap clocks x2x10 sidelying right   Sidelying right IR/ER 2x10   Wall washes x5  *Pain stopped   Scap retractions x10 standing with sb at table   Horizontal movement with sb standing at table    Scap clocks sidelying right multiple bouts   Horizontal wall walks/no tb   Standing red TC rows x20  Standing red TC shoulder  extension x20    UBE x4' (2,2)   Scap clocks x20   Serratus punches x20 1.5#   Supine IR/ER 1.5#   Wall walks x4 laps   UE wall slides x10    Manual Therapy  PROM left UE   STM to anterior left shoulder    RS to left shoulder IR/ER 45 abd   Serratus punches isometrics (submax) multiple bouts   Inferior glide xmltip bouts gr 2 left shoulder   PA;s 90 felx gr 2  PROM left UE   STM to anterior left shoulder    RS to left shoulder IR/ER 45 abd   Serratus punches isometrics (submax) multiple bouts   Inferior glide xmltip bouts gr 2 left shoulder   PA;s 90 felx gr 2    STM to anterior shoulder left   RS in 45 abd ER/IR   90/90 RS hooklying      Therapeutic Exercise Minutes 15 15 30 30   Manual Therapy Minutes  30 15 15   Evaluation Minutes 30      Total Time Of Timed Procedures 15 45 45 45   Total Time Of Service-Based Procedures 30 0 0 0   Total Treatment Time 45 45 45 45   HEP Pendulum  Bent over rows            HEP  Pendulum  Bent over rows     Charges  1 MT 2 TE

## 2025-07-21 ENCOUNTER — OFFICE VISIT (OUTPATIENT)
Dept: PHYSICAL THERAPY | Facility: HOSPITAL | Age: 75
End: 2025-07-21
Attending: FAMILY MEDICINE
Payer: MEDICARE

## 2025-07-21 ENCOUNTER — APPOINTMENT (OUTPATIENT)
Dept: OCCUPATIONAL MEDICINE | Facility: HOSPITAL | Age: 75
End: 2025-07-21
Attending: FAMILY MEDICINE
Payer: MEDICARE

## 2025-07-21 PROCEDURE — 97140 MANUAL THERAPY 1/> REGIONS: CPT | Performed by: PHYSICAL THERAPIST

## 2025-07-21 PROCEDURE — 97110 THERAPEUTIC EXERCISES: CPT | Performed by: PHYSICAL THERAPIST

## 2025-07-21 NOTE — PROGRESS NOTES
Patient: Annie Vogel (74 year old, female) Referring Provider:  Insurance:   Diagnosis: M75.102 (ICD-10-CM) - 726.10 (ICD-9-CM) - Rotator cuff syndrome of left shoulder Ramírez Nicolekae MACARIO   Date of Surgery: No data recorded Next MD visit:  N/A   Precautions:    No data recorded Referral Information:    Date of Evaluation: Req: 0, Auth: 0, Exp:     No data recorded POC Auth Visits:          Today's Date   7/21/2025    Subjective  Statest that her shooting pain is gone, is noting not as much cracking .  still having difficulty with hooking bra.       Pain: 3/10     Objective  Palpation with decreased tenderness along left UT,levators, supraspinatus mm .            Assessment  Tolerated weights for left shoulder strength for mid, low traps without difficulty .    Goals (to be met in 12 visits)   Goals: (to be met in 12 visits)     Not Met Progress Toward Partially Met Met   Pt will report improved ability to sleep without waking due to shoulder pain. []  []  []  []    Pt will improve shoulder flexion AROM to >100 degrees to be able to reach into overhead cabinets without pain or restriction. []  []  []  []    Pt will improve shoulder abduction AROM to >100 degrees to improve ability to don deodorant, don/doff shirts, and wash hair. []  []  []  []    Pt will increase shoulder AROM ER to >45 to be able to reach and fasten seatbelt. []  []  []  []    Pt will increase shoulder AROM IR to >45 to be able to reach in back pocket, tuck in shirt, and turn steering wheel without pain. []  []  []  []    Pt will improve shoulder strength throughout to 4/5 to improve function with home and self care activities .  []  []  []  []    Pt will demonstrate increased mid/low trap strength to 5/5 to promote improved shoulder mechanics and stabilization with lifting and reaching. []  []  []  []    Pt will be independent and compliant with comprehensive HEP to maintain progress achieved in PT. []  []  []  []                  Plan  Continue as outlined in POC    Treatment Last 4 Visits  Treatment Day: 5       7/10/2025 7/14/2025 7/17/2025 7/21/2025   UE Treatment   Therapeutic Exercise Scap clocks x2x10 sidelying right   Sidelying right IR/ER 2x10   Wall washes x5  *Pain stopped   Scap retractions x10 standing with sb at table   Horizontal movement with sb standing at table    Scap clocks sidelying right multiple bouts   Horizontal wall walks/no tb   Standing red TC rows x20  Standing red TC shoulder  extension x20    UBE x4' (2,2)   Scap clocks x20   Serratus punches x20 1.5#   Supine IR/ER 1.5#   Wall walks x4 laps   UE wall slides x10  UBE x4' (2,2)   Scap clocks x20   Serratus punches x20 1.5#   Siedlying IR/ER 1.5# x20   Standing rows x10 x2 1.5#   Standing extesnion x10 x2 @1.5      Manual Therapy PROM left UE   STM to anterior left shoulder    RS to left shoulder IR/ER 45 abd   Serratus punches isometrics (submax) multiple bouts   Inferior glide xmltip bouts gr 2 left shoulder   PA;s 90 felx gr 2  PROM left UE   STM to anterior left shoulder    RS to left shoulder IR/ER 45 abd   Serratus punches isometrics (submax) multiple bouts   Inferior glide xmltip bouts gr 2 left shoulder   PA;s 90 felx gr 2    STM to anterior shoulder left   RS in 45 abd ER/IR   90/90 RS hooklying    STM to anterior shoulder left   RS in 45 abd ER/IR   90/90 RS hooklying      Therapeutic Exercise Minutes 15 30 30 30   Manual Therapy Minutes 30 15 15 15   Total Time Of Timed Procedures 45 45 45 45   Total Time Of Service-Based Procedures 0 0 0 0   Total Treatment Time 45 45 45 45   HEP    Access Code: [email or web address]  URL: [email or web address]  Date: 07/21/2025  Prepared by: Lorie Kuo    Exercises  - Flexion-Extension Shoulder Pendulum with Table Support  - 1 x daily - 7 x weekly - 3 sets - 10 reps  - Horizontal Shoulder Pendulum with Table Support  - 1 x daily - 7 x weekly - 3 sets - 10 reps  - Standing Scapular Retraction  - 1 x daily - 7 x  weekly - 3 sets - 10 reps  - Standing Shoulder Extension with Dowel  - 1 x daily - 7 x weekly - 2 sets - 10 reps  - Standing Shoulder Internal Rotation AAROM with Dowel  - 1 x daily - 7 x weekly - 2 sets - 10 reps  - Low Trap Setting at Wall  - 1 x daily - 7 x weekly - 2 sets - 10 reps        HEP  Access Code: [email or web address]  URL: [email or web address]  Date: 07/21/2025  Prepared by: Lorie Kuo    Exercises  - Flexion-Extension Shoulder Pendulum with Table Support  - 1 x daily - 7 x weekly - 3 sets - 10 reps  - Horizontal Shoulder Pendulum with Table Support  - 1 x daily - 7 x weekly - 3 sets - 10 reps  - Standing Scapular Retraction  - 1 x daily - 7 x weekly - 3 sets - 10 reps  - Standing Shoulder Extension with Dowel  - 1 x daily - 7 x weekly - 2 sets - 10 reps  - Standing Shoulder Internal Rotation AAROM with Dowel  - 1 x daily - 7 x weekly - 2 sets - 10 reps  - Low Trap Setting at Wall  - 1 x daily - 7 x weekly - 2 sets - 10 reps    Charges  2 TE 1 MT

## 2025-07-25 ENCOUNTER — OFFICE VISIT (OUTPATIENT)
Dept: PHYSICAL THERAPY | Facility: HOSPITAL | Age: 75
End: 2025-07-25
Attending: FAMILY MEDICINE
Payer: MEDICARE

## 2025-07-25 ENCOUNTER — OFFICE VISIT (OUTPATIENT)
Facility: CLINIC | Age: 75
End: 2025-07-25
Payer: MEDICARE

## 2025-07-25 VITALS — WEIGHT: 169 LBS | BODY MASS INDEX: 28.5 KG/M2 | HEIGHT: 64.5 IN

## 2025-07-25 DIAGNOSIS — M75.102 ROTATOR CUFF SYNDROME OF LEFT SHOULDER: ICD-10-CM

## 2025-07-25 DIAGNOSIS — S62.347D CLOSED NONDISPLACED FRACTURE OF BASE OF FIFTH METACARPAL BONE OF LEFT HAND WITH ROUTINE HEALING, SUBSEQUENT ENCOUNTER: Primary | ICD-10-CM

## 2025-07-25 PROCEDURE — 99214 OFFICE O/P EST MOD 30 MIN: CPT | Performed by: FAMILY MEDICINE

## 2025-07-25 PROCEDURE — 1159F MED LIST DOCD IN RCRD: CPT | Performed by: FAMILY MEDICINE

## 2025-07-25 PROCEDURE — 97110 THERAPEUTIC EXERCISES: CPT | Performed by: PHYSICAL THERAPIST

## 2025-07-25 PROCEDURE — 1126F AMNT PAIN NOTED NONE PRSNT: CPT | Performed by: FAMILY MEDICINE

## 2025-07-25 PROCEDURE — 97140 MANUAL THERAPY 1/> REGIONS: CPT | Performed by: PHYSICAL THERAPIST

## 2025-07-25 PROCEDURE — 1160F RVW MEDS BY RX/DR IN RCRD: CPT | Performed by: FAMILY MEDICINE

## 2025-07-25 NOTE — PROGRESS NOTES
Sports Medicine Clinic Note    Subjective:    Chief Complaint: Left hand and wrist pain and swelling    Date of Injury: 4/16/25    History: 74-year-old right-hand dominant female presents for follow-up of a healing left 5th metacarpal base fracture. She reports persistent swelling and intermittent soreness, particularly after rest between exercises, though overall pain remains well controlled. She notes improved range of motion and strength through occupational therapy, with increased ability to perform daily tasks. However, fine motor control remains limited, and she reports stiffness and fatigue during activities like marble sorting. She continues to use a wrist brace during exercises and when concerned about reinjury but is encouraged to wean from it. She is also undergoing physical therapy for a left shoulder strain and reports improved pain control, less cracking, and increased function, although she still has difficulty with certain movements like fastening her bra.    Objective:    Left Hand and Wrist Examination:    Inspection: Mild dorsal hand and wrist swelling noted. No erythema or skin changes.  Palpation: Mild residual tenderness at the 5th metacarpal base.  Range of Motion: Wrist flexion to 70°, extension to 65°, ulnar deviation to 30°, radial deviation to 25°. Finger range of motion improved across MCP, PIP, and DIP joints. FINLEY improved significantly compared to prior evaluation.  Neurovascular: Intact sensation throughout hand. Capillary refill <2 seconds.  Special Tests: No instability on wrist stress testing.  strength measured at 11.4 lbs in the left hand, with reduced pinch strength noted across all modalities.    Left Shoulder Examination:    Inspection: No atrophy or swelling. Symmetric shoulder contour maintained.  Palpation: Decreased tenderness over left upper trapezius, levators, and supraspinatus.  Range of Motion: Improved flexion and abduction. Internal and external rotation limited  but progressing with therapy. Still has difficulty with tasks requiring internal rotation such as donning a bra.  Neurovascular: No deficits detected.  Special Tests: Mild discomfort with El-Zay. Drop arm and lag signs negative. Mid and low trap strength improving with tolerated resistance training.    Diagnostic Tests:    No new testing.    Previous radiographs of the left wrist demonstrated a healing intra-articular fracture at the base of the fifth metacarpal with mild bony callus formation and stable alignment. Mild degenerative changes were noted at the radiocarpal and first CMC joints.    Assessment:    Healing fracture of the left 5th metacarpal base with ongoing stiffness and mild edema  Post-traumatic stiffness and weakness of the left hand and wrist  Persistent left shoulder strain, improving with therapy    Plan:    Additional Workup: Consider repeat hand radiographs in 3-4 weeks if swelling persists or worsens.  Therapy: Continue occupational therapy for left hand and wrist with focus on ROM and progressive strengthening. Continue physical therapy for left shoulder with HEP adherence.  Medications: NSAIDs and acetaminophen as needed for discomfort.  Bracing/Casting: Continue gradual weaning from the wrist brace except during high-risk activities.  Procedures: None at this time.  Activity Recommendations: Avoid heavy lifting and overhead activities. Reassess readiness for long-distance driving in 3-4 weeks based on strength and comfort.    Follow-Up: 4-6 weeks or sooner as clinically indicated.      Ramírez Pulido DO, CAQSM   Primary Care Sports Medicine

## 2025-07-31 ENCOUNTER — OFFICE VISIT (OUTPATIENT)
Dept: OCCUPATIONAL MEDICINE | Facility: HOSPITAL | Age: 75
End: 2025-07-31
Attending: FAMILY MEDICINE
Payer: MEDICARE

## 2025-07-31 PROCEDURE — 97140 MANUAL THERAPY 1/> REGIONS: CPT

## 2025-07-31 PROCEDURE — 97110 THERAPEUTIC EXERCISES: CPT

## 2025-08-07 ENCOUNTER — OFFICE VISIT (OUTPATIENT)
Dept: OCCUPATIONAL MEDICINE | Facility: HOSPITAL | Age: 75
End: 2025-08-07
Attending: FAMILY MEDICINE

## 2025-08-07 ENCOUNTER — OFFICE VISIT (OUTPATIENT)
Dept: PHYSICAL THERAPY | Facility: HOSPITAL | Age: 75
End: 2025-08-07
Attending: FAMILY MEDICINE

## 2025-08-07 PROCEDURE — 97110 THERAPEUTIC EXERCISES: CPT

## 2025-08-07 PROCEDURE — 97140 MANUAL THERAPY 1/> REGIONS: CPT | Performed by: PHYSICAL THERAPIST

## 2025-08-07 PROCEDURE — 97140 MANUAL THERAPY 1/> REGIONS: CPT

## 2025-08-07 PROCEDURE — 97110 THERAPEUTIC EXERCISES: CPT | Performed by: PHYSICAL THERAPIST

## 2025-08-14 ENCOUNTER — OFFICE VISIT (OUTPATIENT)
Dept: PHYSICAL THERAPY | Facility: HOSPITAL | Age: 75
End: 2025-08-14
Attending: FAMILY MEDICINE

## 2025-08-14 ENCOUNTER — OFFICE VISIT (OUTPATIENT)
Dept: OCCUPATIONAL MEDICINE | Facility: HOSPITAL | Age: 75
End: 2025-08-14
Attending: FAMILY MEDICINE

## 2025-08-14 PROCEDURE — 97110 THERAPEUTIC EXERCISES: CPT | Performed by: PHYSICAL THERAPIST

## 2025-08-14 PROCEDURE — 97140 MANUAL THERAPY 1/> REGIONS: CPT | Performed by: PHYSICAL THERAPIST

## 2025-08-14 PROCEDURE — 97110 THERAPEUTIC EXERCISES: CPT

## 2025-08-15 ENCOUNTER — OFFICE VISIT (OUTPATIENT)
Facility: CLINIC | Age: 75
End: 2025-08-15

## 2025-08-15 ENCOUNTER — HOSPITAL ENCOUNTER (OUTPATIENT)
Dept: GENERAL RADIOLOGY | Age: 75
Discharge: HOME | End: 2025-08-15
Attending: FAMILY MEDICINE

## 2025-08-15 VITALS — HEIGHT: 64.5 IN | BODY MASS INDEX: 28.5 KG/M2 | WEIGHT: 169 LBS

## 2025-08-15 DIAGNOSIS — M79.672 LEFT FOOT PAIN: ICD-10-CM

## 2025-08-15 DIAGNOSIS — S99.922A INJURY OF TOE ON LEFT FOOT, INITIAL ENCOUNTER: ICD-10-CM

## 2025-08-15 DIAGNOSIS — R22.32 LOCALIZED SWELLING OF FINGER OF LEFT HAND: ICD-10-CM

## 2025-08-15 DIAGNOSIS — S62.347D CLOSED NONDISPLACED FRACTURE OF BASE OF FIFTH METACARPAL BONE OF LEFT HAND WITH ROUTINE HEALING, SUBSEQUENT ENCOUNTER: Primary | ICD-10-CM

## 2025-08-15 PROCEDURE — 1126F AMNT PAIN NOTED NONE PRSNT: CPT | Performed by: FAMILY MEDICINE

## 2025-08-15 PROCEDURE — 1159F MED LIST DOCD IN RCRD: CPT | Performed by: FAMILY MEDICINE

## 2025-08-15 PROCEDURE — 73130 X-RAY EXAM OF HAND: CPT | Performed by: FAMILY MEDICINE

## 2025-08-15 PROCEDURE — 73630 X-RAY EXAM OF FOOT: CPT | Performed by: FAMILY MEDICINE

## 2025-08-15 PROCEDURE — 1160F RVW MEDS BY RX/DR IN RCRD: CPT | Performed by: FAMILY MEDICINE

## 2025-08-15 PROCEDURE — 99214 OFFICE O/P EST MOD 30 MIN: CPT | Performed by: FAMILY MEDICINE

## 2025-08-21 ENCOUNTER — APPOINTMENT (OUTPATIENT)
Dept: PHYSICAL THERAPY | Facility: HOSPITAL | Age: 75
End: 2025-08-21

## 2025-08-25 ENCOUNTER — APPOINTMENT (OUTPATIENT)
Dept: OCCUPATIONAL MEDICINE | Facility: HOSPITAL | Age: 75
End: 2025-08-25
Attending: FAMILY MEDICINE

## 2025-08-25 ENCOUNTER — APPOINTMENT (OUTPATIENT)
Dept: PHYSICAL THERAPY | Facility: HOSPITAL | Age: 75
End: 2025-08-25
Attending: FAMILY MEDICINE

## (undated) DIAGNOSIS — Z00.00 ROUTINE GENERAL MEDICAL EXAMINATION AT A HEALTH CARE FACILITY: Primary | ICD-10-CM

## (undated) DIAGNOSIS — E78.00 PURE HYPERCHOLESTEROLEMIA: ICD-10-CM

## (undated) DIAGNOSIS — E11.9 TYPE 2 DIABETES MELLITUS WITHOUT COMPLICATION, WITHOUT LONG-TERM CURRENT USE OF INSULIN (HCC): Primary | ICD-10-CM

## (undated) DIAGNOSIS — E11.9 TYPE 2 DIABETES MELLITUS WITHOUT COMPLICATION, WITHOUT LONG-TERM CURRENT USE OF INSULIN (HCC): ICD-10-CM

## (undated) NOTE — LETTER
03/29/21        [address] [place] [id number]      Dear Paul Peralta,    [facility] records indicate that you have outstanding lab work and or testing that was ordered for you and has not yet been completed:  Orders Placed This [place]

## (undated) NOTE — Clinical Note
04/06/2017              [address] [place] [id number]      Dear Gabi Arce,     We are contacting you from Dr. Kaelyn Cooley office. Your health is important to us.  We have not received test results for blood work that your provider

## (undated) NOTE — LETTER
07/14/17        [address]  [phone]      Dear Cornell Sosa,    [facility] records indicate that you have outstanding lab work and or testing that was ordered for you and has not yet been completed:          LIPID PANEL  To pr

## (undated) NOTE — MR AVS SNAPSHOT
[facility] [address], [address] [place] [address]                    After Visit Summary   6/16/2017    Eliseo [place]    MRN: [id number]           Visit Information        Provider Department Dept Phone    6/16/ aspirin (ECOTRIN LOW STRENGTH) 81 MG Oral Tab EC Take 81 mg by mouth daily. Multiple Vitamins-Minerals (CENTRUM) Oral Tab Take 1 tablet by mouth daily.       Diagnoses for This Visit     At high risk for breast cancer   [[id number]]  -  Primary     Flat ep